# Patient Record
Sex: FEMALE | Race: WHITE | NOT HISPANIC OR LATINO | Employment: FULL TIME | URBAN - METROPOLITAN AREA
[De-identification: names, ages, dates, MRNs, and addresses within clinical notes are randomized per-mention and may not be internally consistent; named-entity substitution may affect disease eponyms.]

---

## 2017-02-27 ENCOUNTER — GENERIC CONVERSION - ENCOUNTER (OUTPATIENT)
Dept: OTHER | Facility: OTHER | Age: 52
End: 2017-02-27

## 2017-04-25 ENCOUNTER — TRANSCRIBE ORDERS (OUTPATIENT)
Dept: ADMINISTRATIVE | Facility: HOSPITAL | Age: 52
End: 2017-04-25

## 2017-04-25 DIAGNOSIS — R10.9 ABDOMINAL PAIN, UNSPECIFIED SITE: Primary | ICD-10-CM

## 2017-05-12 ENCOUNTER — HOSPITAL ENCOUNTER (OUTPATIENT)
Dept: RADIOLOGY | Facility: MEDICAL CENTER | Age: 52
Discharge: HOME | End: 2017-05-12
Payer: COMMERCIAL

## 2017-05-12 DIAGNOSIS — R10.9 ABDOMINAL PAIN, UNSPECIFIED SITE: ICD-10-CM

## 2017-05-12 PROCEDURE — 76700 US EXAM ABDOM COMPLETE: CPT

## 2018-01-10 NOTE — MISCELLANEOUS
Message   Recorded as Task   Date: 09/29/2016 03:42 PM, Created By: Boris Wade   Task Name: Med Renewal Request   Assigned To: Alycia Kumari   Regarding Patient: Bal Ball, Status: In Progress   Comment:    Lissett Ross - 29 Sep 2016 3:42 PM     TASK CREATED  Caller: Self; [phone] [facility] Phone)  pt needs refill of tranexamic acid please advise rx Rajivos Goodell pt @ [phone]   [facility] - 29 Sep 2016 3:44 PM     TASK IN PROGRESS   Aj Jaime - 29 Sep 2016 3:48 PM     TASK EDITED   Rx refilled - enough till yly in 2 mos        Active Problems    1  Abnormal mammogram (793 80) (R92 8)   2  Encounter for routine gynecological examination (V72 31) (Z01 419)   3  Encounter for screening mammogram for malignant neoplasm of breast (V76 12)   (Z12 31)   4  Hemorrhoids (455 6) (K64 9)   5  Lump, breast (611 72) (N63)   6  Menorrhagia (626 2) (N92 0)   7  Right knee pain (719 46) (M25 561)   8  Screening for HPV (human papillomavirus) (V73 81) (Z11 51)   9  Visit for screening mammogram (V76 12) (Z12 31)    Current Meds   1  Lisinopril-Hydrochlorothiazide 20-12 5 MG Oral Tablet; Therapy: (Recorded:73Txf7875) to Recorded   2  Meloxicam 15 MG Oral Tablet; Therapy: (Recorded:37Hsr0716) to Recorded   3  Potassium Chloride Veronica ER 10 MEQ Oral Tablet Extended Release; Therapy: (Recorded:14Jun2015) to Recorded   4  TraMADol HCl - 50 MG Oral Tablet; Therapy: (Recorded:20Mar2014) to Recorded   5  Tranexamic Acid 650 MG Oral Tablet; TAKE 2 TABLETS 3 TIMES A DAY FOR 5 DAYS; Therapy: 41YIU1396 to (Evaluate:26Jun2016)  Requested for: 28Mar2016; Last   Rx:28Mar2016 Ordered    Allergies    1  Sulfa Drugs    Plan  Menorrhagia    · Tranexamic Acid 650 MG Oral Tablet (Lysteda); TAKE 2 TABLETS 3 TIMES A  DAY FOR 5 DAYS    Signatures   Electronically signed by :  Marie Lockett, ; Sep 29 2016  3:48PM EST                       (Author)

## 2018-01-14 NOTE — MISCELLANEOUS
Message   Recorded as Task   Date: 02/25/2017 09:13 AM, Created By: System   Task Name: Rx Renew Request   Assigned To: Abebe Fee   Regarding Patient: Prabha Orta, Status: In Progress   Comment:    System - 25 Feb 2017 9:13 AM     PHARMACY: [id number]  PATIENT: Prabha Orta  MEDICATION: NICA 0 35 MG      TAB Adrienne Read - 27 Feb 2017 7:24 AM     TASK REASSIGNED: Previously Assigned To Samantha Rock - 27 Feb 2017 8:56 AM     TASK EDITED  lmtcb  Pt needs 3 mo pill check   Marylene Noon - 27 Feb 2017 9:10 AM     TASK IN PROGRESS   Marylene Noon - 27 Feb 2017 9:37 AM     TASK EDITED  Pt has menorrhagia  She was to start [place] with her menses  Her menses began 1/18 and she did begin oc same day  Period lasted 3 weeks - but she said menses often lasted very long also - but she stopped pill  She is now on nothing  Obviously, does not need pill check  What to do? Destiny Price - 27 Feb 2017 1:27 PM     TASK REPLIED TO: Previously Assigned To Destiny Price  She could restart Nica x 3 months, knowing that she will have irregular bleeding for the first 3 months  She was having periods lasting 12 days in the past so she probably will have irregular bleeding for the first few months  Other option would be Mirena which we discussed at her visit, as well  Marylene Noon - 27 Feb 2017 1:36 PM     TASK EDITED  Pt not interested in [address]  Will restart nica with menses and call for pill check in 3 mos        Active Problems    1  Abnormal mammogram (793 80) (R92 8)   2  Encounter for gynecological examination without abnormal finding (V72 31) (Z01 419)   3  Encounter for screening mammogram for malignant neoplasm of breast (V76 12)   (Z12 31)   4  Hemorrhoids (455 6) (K64 9)   5  Lump, breast (611 72) (N63)   6  Menorrhagia (626 2) (N92 0)   7  Menorrhagia with irregular cycle (626 2) (N92 1)   8  Right knee pain (719 46) (M25 561)   9   Screening for HPV (human papillomavirus) (V73 81) (Z11 51)   10  Visit for screening mammogram (V76 12) (Z12 31)    Current Meds   1  Nica 0 35 MG Oral Tablet; TAKE 1 TABLET DAILY; Therapy: 64CZH8929 to ()  Requested for: 30MOP9347; Last   Rx:[id number] Ordered   2  Lisinopril-Hydrochlorothiazide 20-12 5 MG Oral Tablet; Therapy: (Recorded:88Mzn2669) to Recorded   3  Meloxicam 15 MG Oral Tablet; Therapy: (Recorded:97Cbq1248) to Recorded   4  Potassium Chloride Veronica ER 10 MEQ Oral Tablet Extended Release; Therapy: (Recorded:14Jun2015) to Recorded   5  TraMADol HCl - 50 MG Oral Tablet; Therapy: (Recorded:20Mar2014) to Recorded   6  Tranexamic Acid 650 MG Oral Tablet (Lysteda); TAKE 2 TABLETS 3 TIMES A DAY FOR 5   DAYS; Therapy: 97JMP2893 to (Evaluate:29Oct2016)  Requested for: 88XAW6883; Last   Rx:[id number] Ordered    Allergies    1  Sulfa Drugs    Signatures   Electronically signed by :  Nicole Lockett, ; Feb 27 2017  1:36PM EST                       (Author)

## 2018-01-16 NOTE — MISCELLANEOUS
Message   Recorded as Task   Date: 11/09/2016 04:20 PM, Created By: Jacinta Zamarripa   Task Name: Call Back   Assigned To: Wayne Martinez   Regarding Patient: Katie Mckinney, Status: In Progress   Comment:    Monalisa Mendieta - 09 Nov 2016 4:20 PM     TASK CREATED  Caller: [facility], Pharmacist; Results Inquiry; [phone] (Day)  Pharmacy is calling for refills on Tranexamic 650 mg  Jose Linares Wg  Luz Welleron - 09 Nov 2016 4:27 PM     TASK IN PROGRESS   Luz Eaton - 09 Nov 2016 4:45 PM     TASK EDITED                 gave verbal refill to pharmacist until she comes for her apt alter this month  Active Problems    1  Abnormal mammogram (793 80) (R92 8)   2  Encounter for routine gynecological examination (V72 31) (Z01 419)   3  Encounter for screening mammogram for malignant neoplasm of breast (V76 12)   (Z12 31)   4  Hemorrhoids (455 6) (K64 9)   5  Lump, breast (611 72) (N63)   6  Menorrhagia (626 2) (N92 0)   7  Right knee pain (719 46) (M25 561)   8  Screening for HPV (human papillomavirus) (V73 81) (Z11 51)   9  Visit for screening mammogram (V76 12) (Z12 31)    Current Meds   1  Lisinopril-Hydrochlorothiazide 20-12 5 MG Oral Tablet; Therapy: (Recorded:18Qed0725) to Recorded   2  Meloxicam 15 MG Oral Tablet; Therapy: (Recorded:13Xzj7308) to Recorded   3  Potassium Chloride Veronica ER 10 MEQ Oral Tablet Extended Release; Therapy: (Recorded:14Jun2015) to Recorded   4  TraMADol HCl - 50 MG Oral Tablet; Therapy: (Recorded:20Mar2014) to Recorded   5  Tranexamic Acid 650 MG Oral Tablet (Lysteda); TAKE 2 TABLETS 3 TIMES A DAY FOR 5   DAYS; Therapy: 08DNP6227 to (Evaluate:29Oct2016)  Requested for: 00FET6930; Last   Rx:[id number] Ordered    Allergies    1   Sulfa Drugs    Signatures   Electronically signed by : Rj Calixto LPN; Nov 9 2253  2:23QY EST                       (Author)

## 2018-01-18 NOTE — MISCELLANEOUS
Message   Recorded as Task   Date: 12/29/2016 04:02 PM, Created By: Ismael Lofton   Task Name: Go to Result   Assigned To: Beau Quijano   Regarding Patient: John Sol, Status: In Progress   Comment:    Destiny Price - 29 Dec 2016 4:02 PM     TASK CREATED  pelvic ultrasound shows stable fibroid which looks submucosal, which could contribute to her prolonged bleeding  We can see how she does on Nica which I gave her at her appt, and she can still consider Mirena which should also help this  [facility] - 29 Dec 2016 4:09 PM     TASK IN PROGRESS   [facility] - 29 Dec 2016 4:09 PM     TASK EDITED  lmtcb   [facility] - 29 Dec 2016 4:14 PM     TASK EDITED  Pt has not started Revonda Loth yet as she has not gotten her period  She is aware of fibroid and will let us know about her bleeding in future        Active Problems    1  Abnormal mammogram (793 80) (R92 8)   2  Encounter for gynecological examination without abnormal finding (V72 31) (Z01 419)   3  Encounter for screening mammogram for malignant neoplasm of breast (V76 12)   (Z12 31)   4  Hemorrhoids (455 6) (K64 9)   5  Lump, breast (611 72) (N63)   6  Menorrhagia (626 2) (N92 0)   7  Menorrhagia with irregular cycle (626 2) (N92 1)   8  Right knee pain (719 46) (M25 561)   9  Screening for HPV (human papillomavirus) (V73 81) (Z11 51)   10  Visit for screening mammogram (V76 12) (Z12 31)    Current Meds   1  Nica 0 35 MG Oral Tablet; TAKE 1 TABLET DAILY; Therapy: 37UMR0453 to (67 488 45 07)  Requested for: 47JTL4225; Last   Rx:[id number] Ordered   2  Lisinopril-Hydrochlorothiazide 20-12 5 MG Oral Tablet; Therapy: (Recorded:17Zlt0679) to Recorded   3  Meloxicam 15 MG Oral Tablet; Therapy: (Recorded:28Eos6830) to Recorded   4  Potassium Chloride Veronica ER 10 MEQ Oral Tablet Extended Release; Therapy: (Recorded:14Jun2015) to Recorded   5  TraMADol HCl - 50 MG Oral Tablet; Therapy: (Recorded:20Mar2014) to Recorded   6   Tranexamic Acid 650 MG Oral Tablet (Lysteda); TAKE 2 TABLETS 3 TIMES A DAY FOR 5   DAYS; Therapy: 93QBU7577 to (Evaluate:29Oct2016)  Requested for: 73IFD7623; Last   Rx:30Sep2016 Ordered    Allergies    1  Sulfa Drugs    Signatures   Electronically signed by :  Marie Lockett, ; Dec 29 2016  4:14PM EST                       (Author)

## 2018-09-11 ENCOUNTER — APPOINTMENT (OUTPATIENT)
Dept: RADIOLOGY | Facility: MEDICAL CENTER | Age: 53
End: 2018-09-11
Payer: COMMERCIAL

## 2018-09-11 ENCOUNTER — TRANSCRIBE ORDERS (OUTPATIENT)
Dept: ADMINISTRATIVE | Facility: HOSPITAL | Age: 53
End: 2018-09-11

## 2018-09-11 DIAGNOSIS — M54.16 LUMBAR RADICULOPATHY: Primary | ICD-10-CM

## 2018-09-11 PROCEDURE — 72100 X-RAY EXAM L-S SPINE 2/3 VWS: CPT

## 2018-09-18 ENCOUNTER — OFFICE VISIT (OUTPATIENT)
Dept: OBGYN CLINIC | Facility: MEDICAL CENTER | Age: 53
End: 2018-09-18
Payer: COMMERCIAL

## 2018-09-18 ENCOUNTER — APPOINTMENT (OUTPATIENT)
Dept: RADIOLOGY | Facility: MEDICAL CENTER | Age: 53
End: 2018-09-18
Payer: COMMERCIAL

## 2018-09-18 VITALS
DIASTOLIC BLOOD PRESSURE: 79 MMHG | HEART RATE: 93 BPM | SYSTOLIC BLOOD PRESSURE: 118 MMHG | HEIGHT: 62 IN | BODY MASS INDEX: 38.09 KG/M2 | WEIGHT: 207 LBS

## 2018-09-18 DIAGNOSIS — M79.651 PAIN IN BOTH THIGHS: ICD-10-CM

## 2018-09-18 DIAGNOSIS — M79.652 PAIN IN BOTH THIGHS: Primary | ICD-10-CM

## 2018-09-18 DIAGNOSIS — M25.551 PAIN OF BOTH HIP JOINTS: ICD-10-CM

## 2018-09-18 DIAGNOSIS — M79.651 PAIN IN BOTH THIGHS: Primary | ICD-10-CM

## 2018-09-18 DIAGNOSIS — M25.552 PAIN OF BOTH HIP JOINTS: ICD-10-CM

## 2018-09-18 DIAGNOSIS — M79.652 PAIN IN BOTH THIGHS: ICD-10-CM

## 2018-09-18 PROCEDURE — 73521 X-RAY EXAM HIPS BI 2 VIEWS: CPT

## 2018-09-18 PROCEDURE — 99242 OFF/OP CONSLTJ NEW/EST SF 20: CPT | Performed by: EMERGENCY MEDICINE

## 2018-09-18 RX ORDER — MELOXICAM 15 MG/1
TABLET ORAL
COMMUNITY
Start: 2018-09-03

## 2018-09-18 RX ORDER — ATORVASTATIN CALCIUM 20 MG/1
TABLET, FILM COATED ORAL
COMMUNITY
Start: 2018-09-04

## 2018-09-18 RX ORDER — PANTOPRAZOLE SODIUM 40 MG/1
TABLET, DELAYED RELEASE ORAL
COMMUNITY
Start: 2018-09-04

## 2018-09-18 RX ORDER — METHOCARBAMOL 750 MG/1
TABLET, FILM COATED ORAL
COMMUNITY
Start: 2018-09-08 | End: 2018-10-03

## 2018-09-18 RX ORDER — POTASSIUM CHLORIDE 20 MEQ/1
TABLET, EXTENDED RELEASE ORAL
COMMUNITY
Start: 2018-09-03 | End: 2018-10-30

## 2018-09-18 RX ORDER — PREDNISONE 20 MG/1
TABLET ORAL
Qty: 18 TABLET | Refills: 0 | Status: SHIPPED | OUTPATIENT
Start: 2018-09-18 | End: 2018-10-03

## 2018-09-18 RX ORDER — LISINOPRIL AND HYDROCHLOROTHIAZIDE 20; 12.5 MG/1; MG/1
TABLET ORAL
COMMUNITY
Start: 2018-09-04 | End: 2018-10-30

## 2018-09-18 NOTE — PROGRESS NOTES
Assessment/Plan:    Diagnoses and all orders for this visit:    Pain in both thighs  -     XR hips bilateral 2 vw w pelvis if performed; Future  -     XR pelvis ap only 1 or 2 vw; Future  -     predniSONE 20 mg tablet; 1 tab PO TID x 3 days, then 1 tab PO BID x 3 days, then 1 tab PO QD x 3 days    Pain of both hip joints  -     XR hips bilateral 2 vw w pelvis if performed; Future  -     XR pelvis ap only 1 or 2 vw; Future    Patient presents for chronic he anterior thigh pain  Differential diagnosis includes lumbar radiculopathy bilaterally, hip osteoarthritis, trochanteric bursitis, or systemic issues such as polymyalgia rheumatica (however patient does not have UE proximal muscle pain/stiffness)  Also t/c infectious  Recommend prednisone taper  Patient declines PT due to time constraints  T/c Rheum eval       Return in about 4 weeks (around 10/16/2018)  Chief Complaint:   b/l leg pain    Subjective:   Patient ID: Layne Sexton is a 45 y o  female  NP referred here today by PCP for chronic b/l anterior thigh pains for which is worsening recently previously treated with meloxicam   Worse with prolonged sitting, walking  Denies shoulder problems  X-ray of the lumbar spine was obtained which did show posterior facet arthritis and mild grade 1 spondylolisthesis  However  Denies back pain   + n/t thighs  She states b/l knees lock up  Worse at night time  Patient does stand many hours at work  Review of Systems   Constitutional: Negative  HENT: Negative  Eyes: Negative  Respiratory: Negative  Cardiovascular: Negative  Gastrointestinal: Negative  Endocrine: Negative  Genitourinary: Negative  Musculoskeletal: Positive for arthralgias and myalgias  Skin: Negative  Neurological: Positive for numbness  Psychiatric/Behavioral: Negative  The following portions of the patient's chart were reviewed and updated as appropriate:    Allergy:    Allergies   Allergen Reactions    Sulfa Antibiotics          Past Medical History:   Diagnosis Date    Hypertension     Rheumatoid arthritis ([address] )        Past Surgical History:   Procedure Laterality Date     SECTION         Social History     Social History    Marital status: /Civil Union     Spouse name: N/A    Number of children: N/A    Years of education: N/A     Occupational History    Not on file  Social History Main Topics    Smoking status: Former Smoker    Smokeless tobacco: Never Used    Alcohol use Yes      Comment: Occasionally    Drug use: No    Sexual activity: Not on file     Other Topics Concern    Not on file     Social History Narrative    No narrative on file       Family History   Problem Relation Age of Onset    Heart disease Mother     Hypertension Mother     Diabetes Mother        Medications:    Current Outpatient Prescriptions:     Acetaminophen (TYLENOL ARTHRITIS PAIN PO), Take by mouth, Disp: , Rfl:     atorvastatin (LIPITOR) 20 mg tablet, , Disp: , Rfl:     lisinopril-hydrochlorothiazide (PRINZIDE,ZESTORETIC) 20-12 5 MG per tablet, , Disp: , Rfl:     meloxicam (MOBIC) 15 mg tablet, , Disp: , Rfl:     methocarbamol (ROBAXIN) 750 mg tablet, , Disp: , Rfl:     Multiple Vitamins-Minerals (CENTRUM SILVER PO), Take by mouth, Disp: , Rfl:     pantoprazole (PROTONIX) 40 mg tablet, , Disp: , Rfl:     potassium chloride (K-DUR,KLOR-CON) 20 mEq tablet, , Disp: , Rfl:     predniSONE 20 mg tablet, 1 tab PO TID x 3 days, then 1 tab PO BID x 3 days, then 1 tab PO QD x 3 days, Disp: 18 tablet, Rfl: 0    There is no problem list on file for this patient  Objective:  Right Hip Exam   Right hip exam is normal      Range of Motion   The patient has normal right hip ROM  Other   Sensation: normal      Left Hip Exam     Tenderness   The patient is experiencing tenderness in the greater trochanter  Range of Motion   The patient has normal left hip ROM      Other   Sensation: normal      Back Exam     Range of Motion   Extension: normal   Flexion: normal   Rotation Right: normal   Rotation Left: normal     Tests   Straight leg raise right: negative  Straight leg raise left: negative    Reflexes   Patellar: normal    Other   Toe Walk: normal  Heel Walk: normal  Sensation: normal  Gait: normal             Physical Exam      Neurologic Exam    Procedures    I have personally reviewed the written report of the pertinent studies       Mild scoliotic deformity is noted  Mild anterolisthesis of L4 on L5  Mild retrolisthesis of L1 and L2      Alignment is otherwise unremarkable      Moderate degenerative facet disease at L4-L5 and L5-S1 bilaterally      Mild degenerative disc disease throughout the lower thoracic and lumbar spine

## 2018-09-18 NOTE — LETTER
September 18, 2018     Nadiya AguilarDO  [address] [place] [address]    Patient: Antelmo Logan   YOB: 1965   Date of Visit: 9/18/2018       Dear Dr Florin Almendarez: Thank you for referring Antelmo Logan to me for evaluation  Below are the relevant portions of my assessment and plan of care  If you have questions, please do not hesitate to call me  I look forward to following Alexis Marie along with you           Sincerely,        Patricia Dow MD        CC: No Recipients

## 2018-10-01 ENCOUNTER — TELEPHONE (OUTPATIENT)
Dept: OBGYN CLINIC | Facility: HOSPITAL | Age: 53
End: 2018-10-01

## 2018-10-01 NOTE — TELEPHONE ENCOUNTER
Please call patient to let her know that I would like to see her again in the office to discuss her pain and reevaluate her  She is scheduled like 10/16 but she can come in sooner

## 2018-10-01 NOTE — TELEPHONE ENCOUNTER
Caller: patient  Callback# [phone]  Dr Madden Current        I received a voice message 10/01 patient is requesting pain medication please advise thanks

## 2018-10-03 ENCOUNTER — APPOINTMENT (OUTPATIENT)
Dept: LAB | Facility: MEDICAL CENTER | Age: 53
End: 2018-10-03
Payer: COMMERCIAL

## 2018-10-03 ENCOUNTER — OFFICE VISIT (OUTPATIENT)
Dept: OBGYN CLINIC | Facility: MEDICAL CENTER | Age: 53
End: 2018-10-03
Payer: COMMERCIAL

## 2018-10-03 VITALS
DIASTOLIC BLOOD PRESSURE: 87 MMHG | HEART RATE: 96 BPM | BODY MASS INDEX: 37.73 KG/M2 | SYSTOLIC BLOOD PRESSURE: 122 MMHG | WEIGHT: 205 LBS | HEIGHT: 62 IN

## 2018-10-03 DIAGNOSIS — M79.652 PAIN IN BOTH THIGHS: Primary | ICD-10-CM

## 2018-10-03 DIAGNOSIS — M79.604 LEG PAIN, BILATERAL: ICD-10-CM

## 2018-10-03 DIAGNOSIS — M79.605 LEG PAIN, BILATERAL: ICD-10-CM

## 2018-10-03 DIAGNOSIS — M79.651 PAIN IN BOTH THIGHS: ICD-10-CM

## 2018-10-03 DIAGNOSIS — M79.652 PAIN IN BOTH THIGHS: ICD-10-CM

## 2018-10-03 DIAGNOSIS — M79.651 PAIN IN BOTH THIGHS: Primary | ICD-10-CM

## 2018-10-03 LAB
ALBUMIN SERPL BCP-MCNC: 3.3 G/DL (ref 3.5–5)
ALP SERPL-CCNC: 57 U/L (ref 46–116)
ALT SERPL W P-5'-P-CCNC: 45 U/L (ref 12–78)
ANION GAP SERPL CALCULATED.3IONS-SCNC: 8 MMOL/L (ref 4–13)
AST SERPL W P-5'-P-CCNC: 22 U/L (ref 5–45)
BASOPHILS # BLD AUTO: 0.06 THOUSANDS/ΜL (ref 0–0.1)
BASOPHILS NFR BLD AUTO: 1 % (ref 0–1)
BILIRUB SERPL-MCNC: 1.13 MG/DL (ref 0.2–1)
BUN SERPL-MCNC: 12 MG/DL (ref 5–25)
CALCIUM SERPL-MCNC: 8.8 MG/DL (ref 8.3–10.1)
CHLORIDE SERPL-SCNC: 97 MMOL/L (ref 100–108)
CK SERPL-CCNC: 125 U/L (ref 26–192)
CO2 SERPL-SCNC: 28 MMOL/L (ref 21–32)
CREAT SERPL-MCNC: 0.53 MG/DL (ref 0.6–1.3)
CRP SERPL QL: <3 MG/L
EOSINOPHIL # BLD AUTO: 0.2 THOUSAND/ΜL (ref 0–0.61)
EOSINOPHIL NFR BLD AUTO: 3 % (ref 0–6)
ERYTHROCYTE [DISTWIDTH] IN BLOOD BY AUTOMATED COUNT: 15.9 % (ref 11.6–15.1)
ERYTHROCYTE [SEDIMENTATION RATE] IN BLOOD: 4 MM/HOUR (ref 0–20)
GFR SERPL CREATININE-BSD FRML MDRD: 107 ML/MIN/1.73SQ M
GLUCOSE P FAST SERPL-MCNC: 106 MG/DL (ref 65–99)
HCT VFR BLD AUTO: 39.5 % (ref 34.8–46.1)
HGB BLD-MCNC: 12.6 G/DL (ref 11.5–15.4)
IMM GRANULOCYTES # BLD AUTO: 0.01 THOUSAND/UL (ref 0–0.2)
IMM GRANULOCYTES NFR BLD AUTO: 0 % (ref 0–2)
LYMPHOCYTES # BLD AUTO: 1.81 THOUSANDS/ΜL (ref 0.6–4.47)
LYMPHOCYTES NFR BLD AUTO: 31 % (ref 14–44)
MCH RBC QN AUTO: 26.3 PG (ref 26.8–34.3)
MCHC RBC AUTO-ENTMCNC: 31.9 G/DL (ref 31.4–37.4)
MCV RBC AUTO: 83 FL (ref 82–98)
MONOCYTES # BLD AUTO: 0.59 THOUSAND/ΜL (ref 0.17–1.22)
MONOCYTES NFR BLD AUTO: 10 % (ref 4–12)
NEUTROPHILS # BLD AUTO: 3.18 THOUSANDS/ΜL (ref 1.85–7.62)
NEUTS SEG NFR BLD AUTO: 55 % (ref 43–75)
NRBC BLD AUTO-RTO: 0 /100 WBCS
PLATELET # BLD AUTO: 248 THOUSANDS/UL (ref 149–390)
PMV BLD AUTO: 10.7 FL (ref 8.9–12.7)
POTASSIUM SERPL-SCNC: 3.4 MMOL/L (ref 3.5–5.3)
PROT SERPL-MCNC: 6.3 G/DL (ref 6.4–8.2)
RBC # BLD AUTO: 4.79 MILLION/UL (ref 3.81–5.12)
SODIUM SERPL-SCNC: 133 MMOL/L (ref 136–145)
TSH SERPL DL<=0.05 MIU/L-ACNC: 1.33 UIU/ML (ref 0.36–3.74)
WBC # BLD AUTO: 5.85 THOUSAND/UL (ref 4.31–10.16)

## 2018-10-03 PROCEDURE — 36415 COLL VENOUS BLD VENIPUNCTURE: CPT

## 2018-10-03 PROCEDURE — 86618 LYME DISEASE ANTIBODY: CPT

## 2018-10-03 PROCEDURE — 86038 ANTINUCLEAR ANTIBODIES: CPT

## 2018-10-03 PROCEDURE — 86430 RHEUMATOID FACTOR TEST QUAL: CPT

## 2018-10-03 PROCEDURE — 85652 RBC SED RATE AUTOMATED: CPT

## 2018-10-03 PROCEDURE — 80053 COMPREHEN METABOLIC PANEL: CPT

## 2018-10-03 PROCEDURE — 82550 ASSAY OF CK (CPK): CPT

## 2018-10-03 PROCEDURE — 86140 C-REACTIVE PROTEIN: CPT

## 2018-10-03 PROCEDURE — 99214 OFFICE O/P EST MOD 30 MIN: CPT | Performed by: EMERGENCY MEDICINE

## 2018-10-03 PROCEDURE — 85025 COMPLETE CBC W/AUTO DIFF WBC: CPT

## 2018-10-03 PROCEDURE — 84443 ASSAY THYROID STIM HORMONE: CPT

## 2018-10-03 NOTE — LETTER
October 3, 2018     Migel Kim, DO  [address]  [facility] [address]    Patient: Frantz Da Silva   YOB: 1965   Date of Visit: 10/3/2018       Dear Dr Shauna Suarez: Thank you for referring Frantz Da Silva to me for evaluation  Below are the relevant portions of my assessment and plan of care  If you have questions, please do not hesitate to call me  I look forward to following Soumya Neely along with you           Sincerely,        Dicky Peabody, MD        CC: No Recipients

## 2018-10-03 NOTE — PROGRESS NOTES
Assessment/Plan:    Diagnoses and all orders for this visit:    Pain in both thighs  -     RF Screen w/ Reflex to Titer; Future  -     LILIAN Screen w/ Reflex to Titer/Pattern; Future  -     C-reactive protein; Future  -     Sedimentation rate, automated; Future  -     CK (with reflex to MB); Future  -     Lyme Antibody Profile with reflex to WB; Future  -     CBC and differential; Future  -     Comprehensive metabolic panel; Future  -     TSH, 3rd generation with Free T4 reflex; Future  -     Ambulatory referral to Rheumatology; Future    Leg pain, bilateral  -     RF Screen w/ Reflex to Titer; Future  -     LILIAN Screen w/ Reflex to Titer/Pattern; Future  -     C-reactive protein; Future  -     Sedimentation rate, automated; Future  -     CK (with reflex to MB); Future  -     Lyme Antibody Profile with reflex to WB; Future  -     CBC and differential; Future  -     Comprehensive metabolic panel; Future  -     TSH, 3rd generation with Free T4 reflex; Future  -     Ambulatory referral to Rheumatology; Future     at this point in time I would recommend obtaining blood work to rule out systemic etiologies  The symptoms could be suspicious for being caused by the lumbar spine as the x-ray does show facet arthritis as well as listhesis, however the patient's neurological exam is normal and she does not have any back pain  In addition to the cost of the obtaining an MRI of the lumbar spine we will hold off at this time  I have also recommended referral to Rheumatology for their evaluation and opinion  Will call with results      Return if symptoms worsen or fail to improve  Chief Complaint:   f/u leg pains    Subjective:   Patient ID: Virginia Barry is a 52 y o  female  Patient returns having had improvement with prednisone while she was on vacation, however she did have the pains at night which woke her up  Pains of b/l thighs and below the knees with lateral thigh n/t    Pain is worse with prolonged walking and even sitting  Also notes pains are worse with menstrual cycle  Denies back or knee pains, however she is now experiencing b/l ankle pains  Patient states she does have distant family members with R A  Initial note:  NP referred here today by PCP for chronic b/l anterior thigh pains for which is worsening recently previously treated with meloxicam   Worse with prolonged sitting, walking  Denies shoulder problems  X-ray of the lumbar spine was obtained which did show posterior facet arthritis and mild grade 1 spondylolisthesis  However  Denies back pain   + n/t thighs  She states b/l knees lock up  Worse at night time  Patient does stand many hours at work  Review of Systems   Constitutional: Negative for fever  Musculoskeletal: Positive for arthralgias and myalgias  The following portions of the patient's chart were reviewed and updated as appropriate: Allergy:    Allergies   Allergen Reactions    Sulfa Antibiotics          Past Medical History:   Diagnosis Date    Hypertension     Rheumatoid arthritis ([facility] [address] )        Past Surgical History:   Procedure Laterality Date     SECTION         Social History     Social History    Marital status: /Civil Union     Spouse name: N/A    Number of children: N/A    Years of education: N/A     Occupational History    Not on file       Social History Main Topics    Smoking status: Former Smoker    Smokeless tobacco: Never Used    Alcohol use Yes      Comment: Occasionally    Drug use: No    Sexual activity: Not on file     Other Topics Concern    Not on file     Social History Narrative    No narrative on file       Family History   Problem Relation Age of Onset    Heart disease Mother     Hypertension Mother     Diabetes Mother        Medications:    Current Outpatient Prescriptions:     Acetaminophen (TYLENOL ARTHRITIS PAIN PO), Take by mouth, Disp: , Rfl:     atorvastatin (LIPITOR) 20 mg tablet, , Disp: , Rfl:   lisinopril-hydrochlorothiazide (PRINZIDE,ZESTORETIC) 20-12 5 MG per tablet, , Disp: , Rfl:     meloxicam (MOBIC) 15 mg tablet, , Disp: , Rfl:     Multiple Vitamins-Minerals (CENTRUM SILVER PO), Take by mouth, Disp: , Rfl:     pantoprazole (PROTONIX) 40 mg tablet, , Disp: , Rfl:     potassium chloride (K-DUR,KLOR-CON) 20 mEq tablet, , Disp: , Rfl:     There is no problem list on file for this patient  Objective:  Right Knee Exam     Tenderness   The patient is experiencing no tenderness  Range of Motion   The patient has normal right knee ROM  Left Knee Exam     Tenderness   The patient is experiencing no tenderness  Range of Motion   The patient has normal left knee ROM  Right Hip Exam     Muscle Strength   Flexion: 5/5     Comments:   Anterior thighs are tender to palpation   patient denies any groin pain with range of motion      Left Hip Exam     Muscle Strength   Flexion: 5/5       Back Exam     Muscle Strength   The patient has normal back strength  Tests   Straight leg raise right: negative  Straight leg raise left: negative    Reflexes   Patellar: normal  Achilles: normal    Other   Toe Walk: normal  Heel Walk: normal  Sensation: normal  Gait: normal     Comments:  Patient with non painful range-of-motion of the lumbar spine in all planes  She does have decreased range of motion in flexion but no pain   negative clonus bilaterally            Physical Exam      Neurologic Exam    Procedures    I have personally reviewed the written report of the pertinent studies  Lumbar spine  FINDINGS:     There is no evidence of acute fracture or destructive osseous lesion      Mild scoliotic deformity is noted  Mild anterolisthesis of L4 on L5    Mild retrolisthesis of L1 and L2      Alignment is otherwise unremarkable      Moderate degenerative facet disease at L4-L5 and L5-S1 bilaterally      Mild degenerative disc disease throughout the lower thoracic and lumbar spine      The pedicles appear intact      Soft tissues are unremarkable      IMPRESSION:     No acute osseous abnormality        Degenerative changes as described

## 2018-10-04 LAB
B BURGDOR IGG SER IA-ACNC: 0.07
B BURGDOR IGM SER IA-ACNC: 0.18
RHEUMATOID FACT SER QL LA: NEGATIVE

## 2018-10-05 LAB — RYE IGE QN: NEGATIVE

## 2018-10-09 ENCOUNTER — TELEPHONE (OUTPATIENT)
Dept: OBGYN CLINIC | Facility: HOSPITAL | Age: 53
End: 2018-10-09

## 2018-10-09 NOTE — TELEPHONE ENCOUNTER
Juan Jose Garcia Enter patient  C/b# [phone]  The patient is requesting her lab results  Done at [facility]

## 2018-10-10 NOTE — TELEPHONE ENCOUNTER
Please inform patient that labs do not reveal any cause of her symptoms  However her sodium, potassium, chloride and bilirubin are mildly abnormal, and she can follow up with her PCP for these nonurgently  Thanks!

## 2018-10-12 NOTE — TELEPHONE ENCOUNTER
Spoke w/ pt and informed her of the results  Also faxing lab results to [address]  Pt would also like to know that since she cannot get into rheumatology until March what she should do in the meantime or if there is someone else she can see sooner  Please advise  Thank you  neck was WNL (within normal limits)/trunk was WNL (within normal limits)

## 2018-10-12 NOTE — TELEPHONE ENCOUNTER
I would like to see the patient back in the office for reevaluation and to discuss next step     Thanks

## 2018-10-16 ENCOUNTER — OFFICE VISIT (OUTPATIENT)
Dept: OBGYN CLINIC | Facility: MEDICAL CENTER | Age: 53
End: 2018-10-16
Payer: COMMERCIAL

## 2018-10-16 VITALS
DIASTOLIC BLOOD PRESSURE: 85 MMHG | HEART RATE: 94 BPM | BODY MASS INDEX: 39.12 KG/M2 | SYSTOLIC BLOOD PRESSURE: 135 MMHG | WEIGHT: 212.6 LBS | HEIGHT: 62 IN

## 2018-10-16 DIAGNOSIS — M54.16 RADICULOPATHY, LUMBAR REGION: ICD-10-CM

## 2018-10-16 DIAGNOSIS — M79.652 PAIN IN BOTH THIGHS: Primary | ICD-10-CM

## 2018-10-16 DIAGNOSIS — M79.651 PAIN IN BOTH THIGHS: Primary | ICD-10-CM

## 2018-10-16 PROCEDURE — 99213 OFFICE O/P EST LOW 20 MIN: CPT | Performed by: EMERGENCY MEDICINE

## 2018-10-16 NOTE — PROGRESS NOTES
Assessment/Plan:    Diagnoses and all orders for this visit:    Pain in both thighs  -     MRI lumbar spine wo contrast; Future    Radiculopathy, lumbar region  -     MRI lumbar spine wo contrast; Future     I would like to obtain an MRI of the lumbar spine for patient with possible lumbar radiculopathy in the bilateral thighs  She does have spondylolisthesis and significant facet osteoarthritis of the lumbar spine on x-rays  Bilateral hip x-rays within normal limits  Reviewed lab work patient to f/u with PCP, however no obvious cause of symptoms based on lab work  She will see if she can get in sooner to see Rheum  Return for Follow Up After Imaging Study  Chief Complaint:   f/u thigh pain    Subjective:   Patient ID: Qasim Manriquez is a 52 y o  female  Patient returns with continued symptoms of the bilaterally anterior thigh pain which she describes as a tearing or ripping sensation  States the pain is worse at nighttime in bed she is having a hard time sleeping  Denies any numbness tingling or back pain  She also notes pains in her feet and her hands  She is currently scheduled with the rheumatologist far out in March of 2019  She since has not followed up with her PCP  Also wishes to review blood work results  States she may lose her job in December as she works for [employer]  Previous note: Patient returns having had improvement with prednisone while she was on vacation, however she did have the pains at night which woke her up  Pains of b/l thighs and below the knees with lateral thigh n/t  Pain is worse with prolonged walking and even sitting  Also notes pains are worse with menstrual cycle  Denies back or knee pains, however she is now experiencing b/l ankle pains  Patient states she does have distant family members with R A        Initial note:  NP referred here today by PCP for chronic b/l anterior thigh pains for which is worsening recently previously treated with meloxicam  Worse with prolonged sitting, walking  Denies shoulder problems  X-ray of the lumbar spine was obtained which did show posterior facet arthritis and mild grade 1 spondylolisthesis  However  Denies back pain   + n/t thighs  She states b/l knees lock up  Worse at night time  Patient does stand many hours at work  Review of Systems    The following portions of the patient's chart were reviewed and updated as appropriate: Allergy:    Allergies   Allergen Reactions    Sulfa Antibiotics          Past Medical History:   Diagnosis Date    Hypertension     Rheumatoid arthritis ([address] )        Past Surgical History:   Procedure Laterality Date     SECTION         Social History     Social History    Marital status: /Civil Union     Spouse name: N/A    Number of children: N/A    Years of education: N/A     Occupational History    Not on file  Social History Main Topics    Smoking status: Former Smoker    Smokeless tobacco: Never Used    Alcohol use Yes      Comment: Occasionally    Drug use: No    Sexual activity: Not on file     Other Topics Concern    Not on file     Social History Narrative    No narrative on file       Family History   Problem Relation Age of Onset    Heart disease Mother     Hypertension Mother     Diabetes Mother        Medications:    Current Outpatient Prescriptions:     Acetaminophen (TYLENOL ARTHRITIS PAIN PO), Take by mouth, Disp: , Rfl:     atorvastatin (LIPITOR) 20 mg tablet, , Disp: , Rfl:     lisinopril-hydrochlorothiazide (PRINZIDE,ZESTORETIC) 20-12 5 MG per tablet, , Disp: , Rfl:     meloxicam (MOBIC) 15 mg tablet, , Disp: , Rfl:     Multiple Vitamins-Minerals (CENTRUM SILVER PO), Take by mouth, Disp: , Rfl:     pantoprazole (PROTONIX) 40 mg tablet, , Disp: , Rfl:     potassium chloride (K-DUR,KLOR-CON) 20 mEq tablet, , Disp: , Rfl:     There is no problem list on file for this patient        Objective:  Right Hip Exam   Right hip exam is normal      Range of Motion   The patient has normal right hip ROM  Other   Sensation: normal      Left Hip Exam     Tenderness   The patient is experiencing tenderness in the greater trochanter  Range of Motion   The patient has normal left hip ROM  Other   Sensation: normal      Back Exam     Range of Motion   Extension: normal   Flexion: normal   Rotation Right: normal   Rotation Left: normal     Tests   Straight leg raise right: negative  Straight leg raise left: negative    Reflexes   Patellar: normal    Other   Toe Walk: normal  Heel Walk: normal  Sensation: normal  Gait: normal             Physical Exam   Constitutional: She is oriented to person, place, and time  She appears well-developed and well-nourished  HENT:   Head: Normocephalic and atraumatic  Eyes: Conjunctivae are normal    Neck: Neck supple  Cardiovascular: Intact distal pulses  Pulmonary/Chest: Effort normal    Neurological: She is alert and oriented to person, place, and time  Skin: Skin is warm and dry  Psychiatric: She has a normal mood and affect  Her behavior is normal    Vitals reviewed  Left facial droop chronic  No jaundice      Neurologic Exam     Mental Status   Oriented to person, place, and time  Procedures    I have personally reviewed the written report of the pertinent studies

## 2018-10-23 ENCOUNTER — HOSPITAL ENCOUNTER (OUTPATIENT)
Dept: RADIOLOGY | Age: 53
Discharge: HOME | End: 2018-10-23
Payer: COMMERCIAL

## 2018-10-23 DIAGNOSIS — M79.652 PAIN IN BOTH THIGHS: ICD-10-CM

## 2018-10-23 DIAGNOSIS — M54.16 RADICULOPATHY, LUMBAR REGION: ICD-10-CM

## 2018-10-23 DIAGNOSIS — M79.651 PAIN IN BOTH THIGHS: ICD-10-CM

## 2018-10-23 PROCEDURE — 72148 MRI LUMBAR SPINE W/O DYE: CPT

## 2018-10-30 ENCOUNTER — OFFICE VISIT (OUTPATIENT)
Dept: OBGYN CLINIC | Facility: MEDICAL CENTER | Age: 53
End: 2018-10-30
Payer: COMMERCIAL

## 2018-10-30 VITALS
SYSTOLIC BLOOD PRESSURE: 112 MMHG | BODY MASS INDEX: 39.05 KG/M2 | HEIGHT: 62 IN | HEART RATE: 88 BPM | WEIGHT: 212.2 LBS | DIASTOLIC BLOOD PRESSURE: 75 MMHG

## 2018-10-30 DIAGNOSIS — M79.651 PAIN IN BOTH THIGHS: Primary | ICD-10-CM

## 2018-10-30 DIAGNOSIS — M48.061 SPINAL STENOSIS OF LUMBAR REGION, UNSPECIFIED WHETHER NEUROGENIC CLAUDICATION PRESENT: ICD-10-CM

## 2018-10-30 DIAGNOSIS — M79.652 PAIN IN BOTH THIGHS: Primary | ICD-10-CM

## 2018-10-30 DIAGNOSIS — M54.16 RADICULOPATHY, LUMBAR REGION: ICD-10-CM

## 2018-10-30 PROCEDURE — 99213 OFFICE O/P EST LOW 20 MIN: CPT | Performed by: EMERGENCY MEDICINE

## 2018-10-30 RX ORDER — AMLODIPINE BESYLATE AND BENAZEPRIL HYDROCHLORIDE 10; 40 MG/1; MG/1
CAPSULE ORAL
COMMUNITY
Start: 2018-10-22 | End: 2022-05-06 | Stop reason: CLARIF

## 2018-10-30 NOTE — PROGRESS NOTES
Assessment/Plan:    Diagnoses and all orders for this visit:    Pain in both thighs  -     Ambulatory referral to Pain Management; Future  -     Ambulatory referral to Physical Therapy; Future    Radiculopathy, lumbar region  -     Ambulatory referral to Pain Management; Future  -     Ambulatory referral to Physical Therapy; Future    Spinal stenosis of lumbar region, unspecified whether neurogenic claudication present  -     Ambulatory referral to Pain Management; Future  -     Ambulatory referral to Physical Therapy; Future    Other orders  -     amLODIPine-benazepril (LOTREL) 10-40 MG per capsule;     I would like to refer to Pain Management and PT for lumbar stenosis seen on MRI for which I believe is the etiology of her chronic b/l thigh pain and n/t  She's had no relief with prednisone taper  Return if symptoms worsen or fail to improve  Chief Complaint:   f/u thigh pains    Subjective:   Patient ID: Cristina Day is a 52 y o  female  Patient returns to review MRI Lumbar spine  She states maybe symptoms improve with prolonged sitting but no change in symptoms with changes in positions or movements  Denies changes in b/b habits or any weakness  Previous note:  Patient returns with continued symptoms of the bilaterally anterior thigh pain which she describes as a tearing or ripping sensation  States the pain is worse at nighttime in bed she is having a hard time sleeping  Denies any numbness tingling or back pain  She also notes pains in her feet and her hands  She is currently scheduled with the rheumatologist far out in March of 2019  She since has not followed up with her PCP  Also wishes to review blood work results  States she may lose her job in December as she works for [email or web address]  Previous note: Patient returns having had improvement with prednisone while she was on vacation, however she did have the pains at night which woke her up    Pains of b/l thighs and below the knees with lateral thigh n/t  Pain is worse with prolonged walking and even sitting  Also notes pains are worse with menstrual cycle  Denies back or knee pains, however she is now experiencing b/l ankle pains  Patient states she does have distant family members with R A  Initial note:  NP referred here today by PCP for chronic b/l anterior thigh pains for which is worsening recently previously treated with meloxicam   Worse with prolonged sitting, walking  Denies shoulder problems  X-ray of the lumbar spine was obtained which did show posterior facet arthritis and mild grade 1 spondylolisthesis  However  Denies back pain   + n/t thighs  She states b/l knees lock up  Worse at night time  Patient does stand many hours at work  Review of Systems    The following portions of the patient's chart were reviewed and updated as appropriate: Allergy:    Allergies   Allergen Reactions    Sulfa Antibiotics          Past Medical History:   Diagnosis Date    Hypertension     Rheumat[address] ([facility] [address] )        Past Surgical History:   Procedure Laterality Date     SECTION         Social History     Social History    Marital status: /Civil Union     Spouse name: N/A    Number of children: N/A    Years of education: N/A     Occupational History    Not on file       Social History Main Topics    Smoking status: Former Smoker    Smokeless tobacco: Never Used    Alcohol use Yes      Comment: Occasionally    Drug use: No    Sexual activity: Not on file     Other Topics Concern    Not on file     Social History Narrative    No narrative on file       Family History   Problem Relation Age of Onset    Heart disease Mother     Hypertension Mother     Diabetes Mother        Medications:    Current Outpatient Prescriptions:     Acetaminophen (TYLENOL ARTHRITIS PAIN PO), Take by mouth, Disp: , Rfl:     amLODIPine-benazepril (LOTREL) 10-40 MG per capsule, , Disp: , Rfl:     atorvastatin (LIPITOR) 20 mg tablet, , Disp: , Rfl:     meloxicam (MOBIC) 15 mg tablet, , Disp: , Rfl:     Multiple Vitamins-Minerals (CENTRUM SILVER PO), Take by mouth, Disp: , Rfl:     pantoprazole (PROTONIX) 40 mg tablet, , Disp: , Rfl:     There is no problem list on file for this patient  Objective:  Ortho Exam    Physical Exam      Neurologic Exam    Procedures    I have personally reviewed the written report of the pertinent studies  IMPRESSION:     Severe facet degenerative change L4-5 and L5-S1 accounts for 5 mm anterolisthesis L4-5      Moderate to severe central stenosis and severe right lateral recess stenosis L4-5    Correlate for right L5 radiculopathy      Degenerative changes L3-4 noted with disc material approaching but not displacing the extraforaminal left L3 nerve root

## 2018-11-06 ENCOUNTER — TRANSCRIBE ORDERS (OUTPATIENT)
Dept: ADMINISTRATIVE | Facility: HOSPITAL | Age: 53
End: 2018-11-06

## 2018-11-06 ENCOUNTER — EVALUATION (OUTPATIENT)
Dept: PHYSICAL THERAPY | Facility: MEDICAL CENTER | Age: 53
End: 2018-11-06
Payer: COMMERCIAL

## 2018-11-06 ENCOUNTER — APPOINTMENT (OUTPATIENT)
Dept: LAB | Facility: MEDICAL CENTER | Age: 53
End: 2018-11-06
Payer: COMMERCIAL

## 2018-11-06 DIAGNOSIS — M54.16 RADICULOPATHY, LUMBAR REGION: ICD-10-CM

## 2018-11-06 DIAGNOSIS — M79.651 PAIN IN BOTH THIGHS: ICD-10-CM

## 2018-11-06 DIAGNOSIS — M48.061 SPINAL STENOSIS OF LUMBAR REGION, UNSPECIFIED WHETHER NEUROGENIC CLAUDICATION PRESENT: Primary | ICD-10-CM

## 2018-11-06 DIAGNOSIS — E87.6 HYPOPOTASSEMIA: Primary | ICD-10-CM

## 2018-11-06 DIAGNOSIS — M79.652 PAIN IN BOTH THIGHS: ICD-10-CM

## 2018-11-06 LAB
ANION GAP SERPL CALCULATED.3IONS-SCNC: 4 MMOL/L (ref 4–13)
BUN SERPL-MCNC: 20 MG/DL (ref 5–25)
CALCIUM SERPL-MCNC: 9.3 MG/DL (ref 8.3–10.1)
CHLORIDE SERPL-SCNC: 105 MMOL/L (ref 100–108)
CO2 SERPL-SCNC: 30 MMOL/L (ref 21–32)
CREAT SERPL-MCNC: 0.54 MG/DL (ref 0.6–1.3)
GFR SERPL CREATININE-BSD FRML MDRD: 107 ML/MIN/1.73SQ M
GLUCOSE P FAST SERPL-MCNC: 85 MG/DL (ref 65–99)
POTASSIUM SERPL-SCNC: 3.5 MMOL/L (ref 3.5–5.3)
SODIUM SERPL-SCNC: 139 MMOL/L (ref 136–145)

## 2018-11-06 PROCEDURE — 80048 BASIC METABOLIC PNL TOTAL CA: CPT | Performed by: FAMILY MEDICINE

## 2018-11-06 PROCEDURE — G8992 OTHER PT/OT  D/C STATUS: HCPCS | Performed by: PHYSICAL THERAPIST

## 2018-11-06 PROCEDURE — G8990 OTHER PT/OT CURRENT STATUS: HCPCS | Performed by: PHYSICAL THERAPIST

## 2018-11-06 PROCEDURE — G8991 OTHER PT/OT GOAL STATUS: HCPCS | Performed by: PHYSICAL THERAPIST

## 2018-11-06 PROCEDURE — 36415 COLL VENOUS BLD VENIPUNCTURE: CPT | Performed by: FAMILY MEDICINE

## 2018-11-06 PROCEDURE — 97161 PT EVAL LOW COMPLEX 20 MIN: CPT | Performed by: PHYSICAL THERAPIST

## 2018-11-06 NOTE — PROGRESS NOTES
PT Evaluation     Today's date: 2018  Patient name: Giana Portillo  : 1965  MRN: [id number]  Referring provider: Giulia Foster MD  Dx:   Encounter Diagnosis     ICD-10-CM    1  Spinal stenosis of lumbar region, unspecified whether neurogenic claudication present M48 061 Ambulatory referral to Physical Therapy   2  Pain in both thighs M79 651 Ambulatory referral to Physical Therapy    M79 652    3  Radiculopathy, lumbar region M54 16 Ambulatory referral to Physical Therapy        Never returned after eval           Assessment  Impairments: abnormal or restricted ROM, activity intolerance, impaired physical strength, lacks appropriate home exercise program and pain with function    Assessment details: Patient is a 53 y/o female who presents with complaints of low back pain with radiculopathy into b/l thighs  No further referral appears necessary at this time based upon examination results  Patient presents with the following impairments: decreased range of motion, decreased strength and decreased ability to perform functional tasks such as ambulation and work duties  Prognosis is good given HEP compliance and PT 2x/wk tapering to 1x/wk over the next 4-6 weeks  Positive prognostic indicators include positive attitude toward recovery  Negative prognostic indicators include co-morbidiites  Please contact me if you have any questions or recommendations  Thank you for the opportunity to share in [address] care  Understanding of Dx/Px/POC: good   Prognosis: good    Goals  STG  Decrease pain by 50% in 4 weeks  Increase range of motion from moderate to mild deficit in 4 weeks  Increase strength by half a grade in 4 weeks  LTG  Patient will be independent in hep in 4 weeks  Patient will be able to perform work duties at [employer] by D/C  Patient will be able to perform ambulation at plof by D/C      Plan  Patient would benefit from: skilled physical therapy  Referral necessary: No  Planned therapy interventions: home exercise program, functional ROM exercises, abdominal trunk stabilization, joint mobilization, manual therapy, neuromuscular re-education, patient education, strengthening, stretching and therapeutic exercise  Frequency: 2x week  Duration in weeks: 6  Plan of Care beginning date: 2018  Plan of Care expiration date: 2018  Treatment plan discussed with: patient        Subjective Evaluation    History of Present Illness  Date of onset: 2015  Mechanism of injury: Patient reports she has been having a lot of pain in her legs  Notes it was not too bad at first but its been getting worse lately  Patient notes that it has been going on for a few years  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    Quality of life: good    Pain  Current pain ratin  At best pain ratin  At worst pain rating: 10  Quality: radiating, throbbing and discomfort  Aggravating factors: standing and walking  Progression: worsening      Diagnostic Tests  MRI studies: abnormal  Patient Goals  Patient goals for therapy: decreased pain, increased strength, return to work, independence with ADLs/IADLs and increased motion          Objective     Active Range of Motion     Additional Active Range of Motion Details  Flexion: moderate deficit  Extension: mild deficit  R SB: moderate deficit  L SB: moderate deficit  R rotation: mild deficit  L rotation: mild deficit    Strength/Myotome Testing     Left Hip   Planes of Motion   Flexion: 4  Extension: 4+  Abduction: 4-  Adduction: 4+    Right Hip   Planes of Motion   Flexion: 4  Extension: 4+  Abduction: 4-  Adduction: 4+    Left Knee   Normal strength    Right Knee   Normal strength    Left Ankle/Foot   Normal strength    Right Ankle/Foot   Normal strength    Additional Strength Details  Abdominal strength: 4-/5    Tests     Additional Tests Details  Repeated flexion: reports legs hurting and feeling numb      Flowsheet Rows      Most Recent Value   PT/OT G-Codes   Current Score  44   Projected Score  57   FOTO information reviewed  Yes   Assessment Type  Evaluation   G code set  Other PT/OT Primary   Other PT Primary Current Status ()  CK   Other PT Primary Goal Status ()  CK          Precautions HTN, RA    Specialty Daily Treatment Diary     Manual                                                     Exercise Diary         bike        pball roll outs Hold for now ----      LTRs        PPT with hold        bridges        SLR        Hip add iso        Hip abd iso        SAQ        GTB shld ext        [facility] MENTAL HEALTH                                                                                    Modalities

## 2018-11-13 ENCOUNTER — APPOINTMENT (OUTPATIENT)
Dept: PHYSICAL THERAPY | Facility: MEDICAL CENTER | Age: 53
End: 2018-11-13
Payer: COMMERCIAL

## 2018-11-16 ENCOUNTER — APPOINTMENT (OUTPATIENT)
Dept: PHYSICAL THERAPY | Facility: MEDICAL CENTER | Age: 53
End: 2018-11-16
Payer: COMMERCIAL

## 2018-11-19 ENCOUNTER — CONSULT (OUTPATIENT)
Dept: PAIN MEDICINE | Facility: CLINIC | Age: 53
End: 2018-11-19
Payer: COMMERCIAL

## 2018-11-19 ENCOUNTER — TELEPHONE (OUTPATIENT)
Dept: PAIN MEDICINE | Facility: CLINIC | Age: 53
End: 2018-11-19

## 2018-11-19 VITALS
BODY MASS INDEX: 37.73 KG/M2 | HEART RATE: 90 BPM | TEMPERATURE: 98.7 F | DIASTOLIC BLOOD PRESSURE: 76 MMHG | WEIGHT: 205 LBS | SYSTOLIC BLOOD PRESSURE: 118 MMHG | HEIGHT: 62 IN

## 2018-11-19 DIAGNOSIS — M48.062 LUMBAR STENOSIS WITH NEUROGENIC CLAUDICATION: Primary | ICD-10-CM

## 2018-11-19 PROCEDURE — 99244 OFF/OP CNSLTJ NEW/EST MOD 40: CPT | Performed by: ANESTHESIOLOGY

## 2018-11-19 RX ORDER — GABAPENTIN 300 MG/1
300 CAPSULE ORAL
Qty: 30 CAPSULE | Refills: 0 | Status: SHIPPED | OUTPATIENT
Start: 2018-11-19 | End: 2019-06-11

## 2018-11-19 NOTE — TELEPHONE ENCOUNTER
Pt called again for an update  States that she has to let them know today if she is going to keep her PT appt for tomorrow or she will get charged  Pt would like a call back as soon as possible to advise  She can be reached at [phone]

## 2018-11-19 NOTE — PROGRESS NOTES
Assessment:  1  Lumbar stenosis with neurogenic claudication        Plan:  The patient's symptoms, history/physical are consistent with pain that is multifactorial in origin but predominantly the result of her spinal stenosis at L4-5 which is leading to bilateral radicular symptoms  At this time, I discussed performing bilateral L4 transforaminal epidural steroid injection to help reduce swelling and inflammation which is leading to her pain symptoms  She was apprised of the most common risks and would like to proceed  She will be scheduled for an upcoming Tuesday or Thursday under fluoroscopic guidance  In addition, to provide relief of her pain symptoms in the interim, I will start her on gabapentin 300 mg at bedtime  She was apprised most common side effects including sleepiness and dizziness  Complete risks and benefits including bleeding, infection, tissue reaction, nerve injury and allergic reaction were discussed  The approach was demonstrated using models and literature was provided  Verbal and written consent was obtained  My impressions and treatment recommendations were discussed in detail with the patient who verbalized understanding and had no further questions  Discharge instructions were provided  I personally saw and examined the patient and I agree with the above discussed plan of care  Orders Placed This Encounter   Procedures    FL spine and pain procedure     Standing Status:   Future     Standing Expiration Date:   11/19/2022     Order Specific Question:   Reason for Exam:     Answer:   Bilateral L4 TF JAMEL     Order Specific Question:   Is the patient pregnant? Answer:   No     Order Specific Question:   Anticoagulant hold needed?      Answer:   No     New Medications Ordered This Visit   Medications    gabapentin (NEURONTIN) 300 mg capsule     Sig: Take 1 capsule (300 mg total) by mouth daily at bedtime for 30 days     Dispense:  30 capsule     Refill:  0       History of Present Illness:    Maricel Leroy is a 52 y o  female who presents for in regards to bilateral lower extremity pain  Symptoms have been present for 2 and half years without any precipitating injury or trauma  Pain is moderate to severe rated 10/10 on numeric rating scale and felt constantly  Symptoms described to be sharp, pressure-like, throbbing, burning, cramping, shooting with numbness and paresthesias that radiates down both legs  Symptoms are aggravated with any position and decreased with sitting  There is no change with coughing, sneezing or bowel movements  Treatment history has included physical therapy which provided no relief  She is currently on meloxicam 15 mg daily which provides no relief  I have personally reviewed and/or updated the patient's past medical history, past surgical history, family history, social history, current medications, allergies, and vital signs today  Review of Systems:    Review of Systems   Constitutional: Negative for fever and unexpected weight change  HENT: Negative for trouble swallowing  Eyes: Negative for visual disturbance  Respiratory: Negative for shortness of breath and wheezing  Cardiovascular: Negative for chest pain and palpitations  Gastrointestinal: Negative for constipation, diarrhea, nausea and vomiting  Endocrine: Negative for cold intolerance, heat intolerance and polydipsia  Genitourinary: Negative for difficulty urinating and frequency  Musculoskeletal: Positive for gait problem and joint swelling  Negative for arthralgias and myalgias  Skin: Negative for rash  Neurological: Positive for weakness  Negative for dizziness, seizures, syncope and headaches  Hematological: Does not bruise/bleed easily  Psychiatric/Behavioral: Negative for dysphoric mood  All other systems reviewed and are negative        Patient Active Problem List   Diagnosis    Lumbar stenosis with neurogenic claudication       Past Medical History: Diagnosis Date    Hypertension     Rheumat[address] [address]        Past Surgical History:   Procedure Laterality Date     SECTION         Family History   Problem Relation Age of Onset    Heart disease Mother     Hypertension Mother     Diabetes Mother        Social History     Occupational History    Not on file  Social History Main Topics    Smoking status: Former Smoker    Smokeless tobacco: Never Used    Alcohol use Yes      Comment: Occasionally    Drug use: No    Sexual activity: Not on file       Current Outpatient Prescriptions on File Prior to Visit   Medication Sig    Acetaminophen (TYLENOL ARTHRITIS PAIN PO) Take by mouth    amLODIPine-benazepril (LOTREL) 10-40 MG per capsule     atorvastatin (LIPITOR) 20 mg tablet     meloxicam (MOBIC) 15 mg tablet     Multiple Vitamins-Minerals (CENTRUM SILVER PO) Take by mouth    pantoprazole (PROTONIX) 40 mg tablet      No current facility-administered medications on file prior to visit  Allergies   Allergen Reactions    Sulfa Antibiotics        Physical Exam:    /76   Pulse 90   Temp 98 7 °F (37 1 °C)   Ht 5' 2" (1 575 m)   Wt 93 kg (205 lb)   BMI 37 49 kg/m²     Constitutional: normal, well developed, well nourished, alert, in no distress and non-toxic and no overt pain behavior   and obese  Eyes: anicteric  HEENT: grossly intact  Neck: supple, symmetric, trachea midline and no masses   Pulmonary:even and unlabored  Cardiovascular:No edema or pitting edema present  Skin:Normal without rashes or lesions and well hydrated  Psychiatric:Mood and affect appropriate  Neurologic:Cranial Nerves II-XII grossly intact  Musculoskeletal:normal     Lumbar Spine Exam  Appearance:  Normal lordosis  Palpation/Tenderness:  no tenderness or spasm  Sensory:  no sensory deficits noted  Range of Motion:  Flexion:  No limitation  without pain  Extension:  Moderately limited  with pain  Lateral Flexion - Left:  Minimally limited  with pain  Lateral Flexion - Right:  Minimally limited  with pain  Rotation - Left:  No limitation  without pain  Rotation - Right:  No limitation  without pain  Motor Strength:  Left hip flexion:  5/5  Left hip extension:  5/5  Right hip flexion:  5/5  Right hip extension:  5/5  Left knee flexion:  5/5  Left knee extension:  5/5  Right knee flexion:  5/5  Right knee extension:  5/5  Left foot dorsiflexion:  5/5  Left foot plantar flexion:  5/5  Right foot dorsiflexion:  5/5  Right foot plantar flexion:  5/5  Reflexes:  Left Patellar:  2+   Right Patellar:  2+   Left Achilles:  2+   Right Achilles:  2+   Special Tests:  Left Straight Leg Test:  positive  Right Straight Leg Test:  positive  Left Randolph's Maneuver:  negative  Right Randolph's Maneuver:  negative    Imaging    MRI LUMBAR SPINE WITHOUT CONTRAST (10/23/2018)     INDICATION: Chronic bilateral leg pain        COMPARISON:  None      TECHNIQUE:  Sagittal T1, sagittal T2, sagittal inversion recovery, axial T1 and axial T2, coronal T2        IMAGE QUALITY:  Diagnostic     FINDINGS:     ALIGNMENT:  Slight anterolisthesis L4 on 5 measures approximately 5 mm      MARROW SIGNAL:  Hemangioma L1      DISTAL CORD AND CONUS:  Normal size and signal within the distal cord and conus  The conus ends at the L1-L2 level      PARASPINAL SOFT TISSUES:  Paraspinal soft tissues are unremarkable      SACRUM:  Normal signal within the sacrum  No evidence of insufficiency or stress fracture      LOWER THORACIC DISC SPACES:  Normal disc height and signal   No disc herniation, canal stenosis or foraminal narrowing      LUMBAR DISC SPACES:     L1-L2:  Mild bulge  No significant central or foraminal narrowing      L2-L3:  No significant central or foraminal narrowing with small right-sided marginal osteophyte      L3-L4:  Mild facet hypertrophy  Mild annular bulging with small marginal osteophyte on the left  There is mild left foraminal narrowing    Disc material approaches but does not displace the extraforaminal left L3 nerve root      L4-L5:  Severe facet degenerative change accounts for slight anterolisthesis  Moderate to severe central stenosis with severe right lateral recess stenosis  Correlate for right L5 radiculopathy      L5-S1:  Severe facet degenerative change  No significant central or foraminal narrowing      IMPRESSION:     Severe facet degenerative change L4-5 and L5-S1 accounts for 5 mm anterolisthesis L4-5      Moderate to severe central stenosis and severe right lateral recess stenosis L4-5  Correlate for right L5 radiculopathy      Degenerative changes L3-4 noted with disc material approaching but not displacing the extraforaminal left L3 nerve root  XR BILATERAL HIPS AND PELVIS (9/18/2018)     INDICATION:   M79 651: Pain in right thigh  M79 652: Pain in left thigh  M25 551: Pain in right hip  M25 552: Pain in left hip      COMPARISON:  2/24/2014     VIEWS:  XR HIPS BILATERAL 2 VW W PELVIS IF PERFORMED        FINDINGS:  The bony pelvis appears intact  Visualized lower lumbar spine is unremarkable      LEFT HIP:  No significant hip degenerative changes  Joint space alignment is maintained  Soft tissues are unremarkable      RIGHT HIP:  No significant hip degenerative changes  Joint space alignment is maintained     Soft tissues are unremarkable

## 2018-11-19 NOTE — TELEPHONE ENCOUNTER
Dr Arroyo Parent    Patient forgot to ask if she should continue therapy because it is not helping  It hurts more  Is asking if she should go to her appt tomorrow 11/20/18 or not  Patient is asking for a call back to discuss this

## 2018-11-20 ENCOUNTER — APPOINTMENT (OUTPATIENT)
Dept: PHYSICAL THERAPY | Facility: MEDICAL CENTER | Age: 53
End: 2018-11-20
Payer: COMMERCIAL

## 2018-11-26 ENCOUNTER — TELEPHONE (OUTPATIENT)
Dept: PAIN MEDICINE | Facility: MEDICAL CENTER | Age: 53
End: 2018-11-26

## 2018-11-26 NOTE — TELEPHONE ENCOUNTER
Pt left a voicemail today @1:02p requesting a c/b to schedule procedure   Pt can be reached at [phone]

## 2018-11-27 ENCOUNTER — APPOINTMENT (OUTPATIENT)
Dept: PHYSICAL THERAPY | Facility: MEDICAL CENTER | Age: 53
End: 2018-11-27
Payer: COMMERCIAL

## 2018-11-28 NOTE — TELEPHONE ENCOUNTER
Pt called back stating she still has not received a call back to schedule the injection  She can be reached at [phone]

## 2018-11-30 ENCOUNTER — APPOINTMENT (OUTPATIENT)
Dept: PHYSICAL THERAPY | Facility: MEDICAL CENTER | Age: 53
End: 2018-11-30
Payer: COMMERCIAL

## 2018-12-11 ENCOUNTER — HOSPITAL ENCOUNTER (OUTPATIENT)
Dept: RADIOLOGY | Facility: CLINIC | Age: 53
Discharge: HOME | End: 2018-12-11
Attending: ANESTHESIOLOGY | Admitting: ANESTHESIOLOGY
Payer: COMMERCIAL

## 2018-12-11 VITALS
DIASTOLIC BLOOD PRESSURE: 81 MMHG | OXYGEN SATURATION: 98 % | TEMPERATURE: 98.2 F | RESPIRATION RATE: 20 BRPM | HEART RATE: 91 BPM | SYSTOLIC BLOOD PRESSURE: 132 MMHG

## 2018-12-11 DIAGNOSIS — M48.062 LUMBAR STENOSIS WITH NEUROGENIC CLAUDICATION: ICD-10-CM

## 2018-12-11 PROCEDURE — 64483 NJX AA&/STRD TFRM EPI L/S 1: CPT | Performed by: ANESTHESIOLOGY

## 2018-12-11 RX ORDER — METHYLPREDNISOLONE ACETATE 80 MG/ML
80 INJECTION, SUSPENSION INTRA-ARTICULAR; INTRALESIONAL; INTRAMUSCULAR; PARENTERAL; SOFT TISSUE ONCE
Status: COMPLETED | OUTPATIENT
Start: 2018-12-11 | End: 2018-12-11

## 2018-12-11 RX ORDER — 0.9 % SODIUM CHLORIDE 0.9 %
10 VIAL (ML) INJECTION ONCE
Status: COMPLETED | OUTPATIENT
Start: 2018-12-11 | End: 2018-12-11

## 2018-12-11 RX ORDER — BUPIVACAINE HCL/PF 2.5 MG/ML
30 VIAL (ML) INJECTION ONCE
Status: COMPLETED | OUTPATIENT
Start: 2018-12-11 | End: 2018-12-11

## 2018-12-11 RX ADMIN — Medication 2 ML: at 13:56

## 2018-12-11 RX ADMIN — IOHEXOL 1 ML: 300 INJECTION, SOLUTION INTRAVENOUS at 13:56

## 2018-12-11 RX ADMIN — SODIUM CHLORIDE 4 ML: 9 INJECTION, SOLUTION INTRAMUSCULAR; INTRAVENOUS; SUBCUTANEOUS at 13:54

## 2018-12-11 RX ADMIN — Medication 4 ML: at 13:54

## 2018-12-11 RX ADMIN — METHYLPREDNISOLONE ACETATE 80 MG: 80 INJECTION, SUSPENSION INTRA-ARTICULAR; INTRALESIONAL; INTRAMUSCULAR; PARENTERAL; SOFT TISSUE at 13:56

## 2018-12-11 NOTE — DISCHARGE INSTR - LAB
Epidural Steroid Injection   WHAT YOU NEED TO KNOW:   An epidural steroid injection (JAMEL) is a procedure to inject steroid medicine into the epidural space  The epidural space is between your spinal cord and vertebrae  Steroids reduce inflammation and fluid buildup in your spine that may be causing pain  You may be given pain medicine along with the steroids  ACTIVITY  · Do not drive or operate machinery today  · No strenuous activity today - bending, lifting, etc   · You may resume normal activites starting tomorrow - start slowly and as tolerated  · You may shower today, but no tub baths or hot tubs  · You may have numbness for several hours from the local anesthetic  Please use caution and common sense, especially with weight-bearing activities  CARE OF THE INJECTION SITE  · If you have soreness or pain, apply ice to the area today (20 minutes on/20 minutes off)  · Starting tomorrow, you may use warm, moist heat or ice if needed  · You may have an increase or change in your discomfort for 36-48 hours after your treatment  · Apply ice and continue with any pain medication you have been prescribed  · Notify [facility] if you have any of the following: redness, drainage, swelling, headache, stiff neck or fever above 100°F     SPECIAL INSTRUCTIONS  · Our office will contact you in approximately 7 days for a progress report  MEDICATIONS  · Continue to take all routine medications  · Our office may have instructed you to hold some medications  If you have a problem specifically related to your procedure, please call our office at [phone]  Problems not related to your procedure should be directed to your primary care physician

## 2018-12-11 NOTE — H&P
History of Present Illness: The patient is a 52 y o  female who presents with complaints of lower back and leg pain secondary to spinal stenosis and is here today for bilateral L4 transforaminal epidural steroid injection  Patient Active Problem List   Diagnosis    Lumbar stenosis with neurogenic claudication       Past Medical History:   Diagnosis Date    Hypertension     Rheumatoid arthritis ([address] )        Past Surgical History:   Procedure Laterality Date     SECTION           Current Outpatient Prescriptions:     Acetaminophen (TYLENOL ARTHRITIS PAIN PO), Take by mouth, Disp: , Rfl:     amLODIPine-benazepril (LOTREL) 10-40 MG per capsule, , Disp: , Rfl:     atorvastatin (LIPITOR) 20 mg tablet, , Disp: , Rfl:     gabapentin (NEURONTIN) 300 mg capsule, Take 1 capsule (300 mg total) by mouth daily at bedtime for 30 days, Disp: 30 capsule, Rfl: 0    meloxicam (MOBIC) 15 mg tablet, , Disp: , Rfl:     Multiple Vitamins-Minerals (CENTRUM SILVER PO), Take by mouth, Disp: , Rfl:     pantoprazole (PROTONIX) 40 mg tablet, , Disp: , Rfl:     Allergies   Allergen Reactions    Sulfa Antibiotics        Physical Exam:   Vitals:    18 1337   BP: 123/84   Pulse: 88   Resp: 20   Temp: 98 2 °F (36 8 °C)   SpO2: 98%     General: Awake, Alert, Oriented x 3, Mood and affect appropriate  Respiratory: Respirations even and unlabored  Cardiovascular: Peripheral pulses intact; no edema  Musculoskeletal Exam:   Lower back tenderness    ASA Score: 2    Patient/Chart Verification  Patient ID Verified: Verbal  ID Band Applied: No  Consents Confirmed: Procedural, To be obtained in the Pre-Procedure area  H&P( within 30 days) Verified: To be obtained in the Pre-Procedure area  Interval H&P(within 24 hr) Complete (required for Outpatients and Surgery Admit only): To be obtained in the Pre-Procedure area  Allergies Reviewed: Yes  Anticoag/NSAID held?: No  Currently on antibiotics?: No    Assessment:   1   Lumbar stenosis with neurogenic claudication        Plan: Bilateral L4 TF JAMEL

## 2018-12-18 ENCOUNTER — TELEPHONE (OUTPATIENT)
Dept: PAIN MEDICINE | Facility: CLINIC | Age: 53
End: 2018-12-18

## 2018-12-20 NOTE — TELEPHONE ENCOUNTER
Patient called stating her percentage of improvement is 90 and her pain level is 1      Alfred Blood patient

## 2019-01-08 ENCOUNTER — TELEPHONE (OUTPATIENT)
Dept: PAIN MEDICINE | Facility: MEDICAL CENTER | Age: 54
End: 2019-01-08

## 2019-01-08 NOTE — TELEPHONE ENCOUNTER
Pt left a voicemail today @ 11:10a stating that she is now having pain and would like to schedule another set of injections   Pt can be reached at [phone]

## 2019-01-11 NOTE — TELEPHONE ENCOUNTER
Patient is calling stating that she is starting to have pain about a week ago  It is in her upper leg  She is asking what to do?

## 2019-01-11 NOTE — TELEPHONE ENCOUNTER
I s/w pt for update of current symptoms; for the past week she has been having pain of both anterior thighs, pain level 6-7/10, she reports a little LB pain  Pt asking if what she can do, another inj? S/P B/L L4 TFESI from 12/11/18  Pls advise

## 2019-01-31 ENCOUNTER — HOSPITAL ENCOUNTER (OUTPATIENT)
Dept: RADIOLOGY | Facility: CLINIC | Age: 54
Discharge: HOME | End: 2019-01-31
Attending: ANESTHESIOLOGY | Admitting: ANESTHESIOLOGY
Payer: COMMERCIAL

## 2019-01-31 VITALS
HEART RATE: 82 BPM | OXYGEN SATURATION: 99 % | TEMPERATURE: 97.8 F | DIASTOLIC BLOOD PRESSURE: 91 MMHG | SYSTOLIC BLOOD PRESSURE: 140 MMHG | RESPIRATION RATE: 20 BRPM

## 2019-01-31 DIAGNOSIS — M48.062 SPINAL STENOSIS, LUMBAR REGION, WITH NEUROGENIC CLAUDICATION: ICD-10-CM

## 2019-01-31 DIAGNOSIS — M48.062 LUMBAR STENOSIS WITH NEUROGENIC CLAUDICATION: ICD-10-CM

## 2019-01-31 PROCEDURE — 64483 NJX AA&/STRD TFRM EPI L/S 1: CPT | Performed by: ANESTHESIOLOGY

## 2019-01-31 RX ORDER — 0.9 % SODIUM CHLORIDE 0.9 %
10 VIAL (ML) INJECTION ONCE
Status: COMPLETED | OUTPATIENT
Start: 2019-01-31 | End: 2019-01-31

## 2019-01-31 RX ORDER — GABAPENTIN 300 MG/1
300 CAPSULE ORAL
Qty: 30 CAPSULE | Refills: 2 | Status: SHIPPED | OUTPATIENT
Start: 2019-01-31 | End: 2019-05-03 | Stop reason: SDUPTHER

## 2019-01-31 RX ORDER — BUPIVACAINE HCL/PF 2.5 MG/ML
10 VIAL (ML) INJECTION ONCE
Status: COMPLETED | OUTPATIENT
Start: 2019-01-31 | End: 2019-01-31

## 2019-01-31 RX ORDER — METHYLPREDNISOLONE ACETATE 80 MG/ML
80 INJECTION, SUSPENSION INTRA-ARTICULAR; INTRALESIONAL; INTRAMUSCULAR; PARENTERAL; SOFT TISSUE ONCE
Status: COMPLETED | OUTPATIENT
Start: 2019-01-31 | End: 2019-01-31

## 2019-01-31 RX ADMIN — BUPIVACAINE HYDROCHLORIDE 2 ML: 2.5 INJECTION, SOLUTION EPIDURAL; INFILTRATION; INTRACAUDAL at 09:32

## 2019-01-31 RX ADMIN — Medication 5 ML: at 09:31

## 2019-01-31 RX ADMIN — SODIUM CHLORIDE 5 ML: 9 INJECTION, SOLUTION INTRAMUSCULAR; INTRAVENOUS; SUBCUTANEOUS at 09:31

## 2019-01-31 RX ADMIN — METHYLPREDNISOLONE ACETATE 80 MG: 80 INJECTION, SUSPENSION INTRA-ARTICULAR; INTRALESIONAL; INTRAMUSCULAR; PARENTERAL; SOFT TISSUE at 09:32

## 2019-01-31 RX ADMIN — IOHEXOL 1 ML: 300 INJECTION, SOLUTION INTRAVENOUS at 09:32

## 2019-01-31 NOTE — DISCHARGE INSTR - LAB
Epidural Steroid Injection   WHAT YOU NEED TO KNOW:   An epidural steroid injection (JAMEL) is a procedure to inject steroid medicine into the epidural space  The epidural space is between your spinal cord and vertebrae  Steroids reduce inflammation and fluid buildup in your spine that may be causing pain  You may be given pain medicine along with the steroids  ACTIVITY  · Do not drive or operate machinery today  · No strenuous activity today - bending, lifting, etc   · You may resume normal activites starting tomorrow - start slowly and as tolerated  · You may shower today, but no tub baths or hot tubs  · You may have numbness for several hours from the local anesthetic  Please use caution and common sense, especially with weight-bearing activities  CARE OF THE INJECTION SITE  · If you have soreness or pain, apply ice to the area today (20 minutes on/20 minutes off)  · Starting tomorrow, you may use warm, moist heat or ice if needed  · You may have an increase or change in your discomfort for 36-48 hours after your treatment  · Apply ice and continue with any pain medication you have been prescribed  · Notify [facility] if you have any of the following: redness, drainage, swelling, headache, stiff neck or fever above 100°F     SPECIAL INSTRUCTIONS  · Our office will contact you in approximately 7 days for a progress report  MEDICATIONS  · Continue to take all routine medications  · Our office may have instructed you to hold some medications  If you have a problem specifically related to your procedure, please call our office at [phone]  Problems not related to your procedure should be directed to your primary care physician

## 2019-01-31 NOTE — H&P
History of Present Illness: The patient is a 52 y o  female who presents with complaints of lower back and leg pain secondary to lumbar spinal stenosis and is here today for bilateral L4 transforaminal epidural steroid injection      Patient Active Problem List   Diagnosis    Lumbar stenosis with neurogenic claudication       Past Medical History:   Diagnosis Date    Hypertension     Rheumatoid arthritis ([address] )        Past Surgical History:   Procedure Laterality Date     SECTION           Current Outpatient Prescriptions:     Acetaminophen (TYLENOL ARTHRITIS PAIN PO), Take by mouth, Disp: , Rfl:     amLODIPine-benazepril (LOTREL) 10-40 MG per capsule, , Disp: , Rfl:     atorvastatin (LIPITOR) 20 mg tablet, , Disp: , Rfl:     gabapentin (NEURONTIN) 300 mg capsule, Take 1 capsule (300 mg total) by mouth daily at bedtime for 30 days, Disp: 30 capsule, Rfl: 0    meloxicam (MOBIC) 15 mg tablet, , Disp: , Rfl:     Multiple Vitamins-Minerals (CENTRUM SILVER PO), Take by mouth, Disp: , Rfl:     pantoprazole (PROTONIX) 40 mg tablet, , Disp: , Rfl:     Current Facility-Administered Medications:     bupivacaine (PF) (MARCAINE) 0 25 % injection 10 mL, 10 mL, Epidural, Once, Torri Carrero MD    iohexol (OMNIPAQUE) 300 mg/mL injection 50 mL, 50 mL, Epidural, Once, Torri Carrero MD    lidocaine (PF) (XYLOCAINE-MPF) 2 % injection 5 mL, 5 mL, Infiltration, Once, Torri Carrero MD    methylPREDNISolone acetate (DEPO-MEDROL) injection 80 mg, 80 mg, Epidural, Once, Torri Carrero MD    sodium chloride (PF) 0 9 % injection 10 mL, 10 mL, Infiltration, Once, Torri Carrero MD    Allergies   Allergen Reactions    Sulfa Antibiotics        Physical Exam:   Vitals:    19 0916   BP: 132/87   Pulse: 79   Resp: 20   Temp: 97 8 °F (36 6 °C)   SpO2: 100%     General: Awake, Alert, Oriented x 3, Mood and affect appropriate  Respiratory: Respirations even and unlabored  Cardiovascular: Peripheral pulses intact; no edema  Musculoskeletal Exam:   Lower back tenderness    ASA Score: 2    Patient/Chart Verification  Patient ID Verified: Verbal  Consents Confirmed: Procedural, To be obtained in the Pre-Procedure area  H&P( within 30 days) Verified: To be obtained in the Pre-Procedure area  Allergies Reviewed: Yes  Anticoag/NSAID held?: NA  Currently on antibiotics?: No    Assessment:   1   Spinal stenosis, lumbar region, with neurogenic claudication        Plan: B/L L4 TFESI

## 2019-02-07 ENCOUNTER — TELEPHONE (OUTPATIENT)
Dept: PAIN MEDICINE | Facility: CLINIC | Age: 54
End: 2019-02-07

## 2019-02-19 NOTE — TELEPHONE ENCOUNTER
Patient  [phone]    Dr Michel He    2-3/10 pain level  25% improvement  Felling much better  Everything is ok  Will call back if things worsen

## 2019-03-20 ENCOUNTER — OFFICE VISIT (OUTPATIENT)
Dept: RHEUMATOLOGY | Facility: CLINIC | Age: 54
End: 2019-03-20
Payer: COMMERCIAL

## 2019-03-20 VITALS
WEIGHT: 218.2 LBS | HEIGHT: 62 IN | SYSTOLIC BLOOD PRESSURE: 118 MMHG | HEART RATE: 84 BPM | BODY MASS INDEX: 40.15 KG/M2 | DIASTOLIC BLOOD PRESSURE: 82 MMHG

## 2019-03-20 DIAGNOSIS — M25.542 ARTHRALGIA OF BOTH HANDS: ICD-10-CM

## 2019-03-20 DIAGNOSIS — M25.50 POLYARTHRALGIA: ICD-10-CM

## 2019-03-20 DIAGNOSIS — M25.541 ARTHRALGIA OF BOTH HANDS: ICD-10-CM

## 2019-03-20 DIAGNOSIS — M79.604 BILATERAL LEG PAIN: ICD-10-CM

## 2019-03-20 DIAGNOSIS — M79.605 BILATERAL LEG PAIN: ICD-10-CM

## 2019-03-20 DIAGNOSIS — M48.062 LUMBAR STENOSIS WITH NEUROGENIC CLAUDICATION: Primary | ICD-10-CM

## 2019-03-20 PROCEDURE — 99204 OFFICE O/P NEW MOD 45 MIN: CPT | Performed by: INTERNAL MEDICINE

## 2019-03-20 RX ORDER — AMOXICILLIN AND CLAVULANATE POTASSIUM 500; 125 MG/1; MG/1
TABLET, FILM COATED ORAL
COMMUNITY
Start: 2018-12-26 | End: 2019-03-20 | Stop reason: ALTCHOICE

## 2019-03-20 NOTE — PROGRESS NOTES
Assessment and Plan:   Patient is a 15-year-old female with lumbar spinal stenosis with neurogenic claudication symptoms in her life, who presents for rheumatology evaluation regarding arthralgias of her hands and knees  Since she was referred to few months ago she was able to see a spine and Pain Management doctors for her like pain which does correlate with her spinal stenosis and neurogenic claudication  Fortunately she did get relief from the injections but it seems they did not last her a significant amount of time  She does plan to return to them for follow-up and possible repeat injections  In terms of the arthralgias in her hands and knees my suspicion this is osteoarthritis as she does have osteoarthritic changes that are evident on exam in her hands  She lacks any evidence of rheumatoid deformities and there is no synovitis or joint swelling anywhere  We discussed this today and that I think the lab workup she had a few months ago is sufficient as there is no evidence of systemic inflammation and rheumatoid factor LILIAN were negative  To be completely will do bilateral hand x-rays to make sure we are not missing any erosive changes that would suggest RA, but overall I believe it will show osteoarthritis  I also suspect she has progressive OA in her knees and discussed with her that options are for physical therapy, along with knee injections but she does not feel the pain is bad enough at this time  This is an option in the future for her that she can have here or with her orthopedic provider that she already sees  Otherwise as long as her hand x-rays are not suggestive of inflammatory arthropathy then she does not need any additional Rheumatology evaluation or follow-up  Plan:  Diagnoses and all orders for this visit:    Lumbar stenosis with neurogenic claudication    Bilateral leg pain    Polyarthralgia    Arthralgia of both hands  -     XR hand 3+ vw left;  Future  -     XR hand 3+ vw right; Future    Other orders  -     Discontinue: amoxicillin-clavulanate (AUGMENTIN) 500-125 mg per tablet        Follow-up plan: no rheumatology follow-up needed       HPI  Kelli White is a 52 y o   female with spinal stenosis with neurogenic claudication, GERD, hypertension, hyperlipidemia who presents for rheumatology consult by request of Dr Elian Truong for bilateral leg pain and hand arthralgia  Patient reports that when she was referred here she was having bilateral leg pain with radiation down her legs  This started happening in the fall of last year and she was subsequently referred for MRI of her lumbar spine which show severe stenotic changes  She has since seen spine and [facility] who identified that her leg pain is coming from her spine and she has neurogenic claudication due to spinal stenosis  She did undergo injections for this issue and states she did feel significant improvement in her leg pain for a temporary time afterwards  However they are now starting to wear off and she is starting to have increased like pain again and she plans to call for repeat injections with them in the near future  She was also experiencing bilateral hand any arthralgias  She reports this is been ongoing for a few years  She does have stiffness in her hands but only for 5 minutes in the morning it resolved when she exercises them for a few minutes  She also has bilateral knee pain and previously had x-rays a few years ago showing the bites and mild OA  Her knees hurt more when she is on her feet all day at work  Her hands and her knees are not a major concern to her compared to her leg pain  She has been taking the meloxicam daily which she does think it helps her hand and knee arthralgia but does not help her leg pain from the spinal stenosis  She has not noticed any joint swelling      She had some autoimmune workup in the fall which showed negative LILIAN, rheumatoid factor, and normal inflammatory markers  She also had negative Lyme  Denies photosensitivity, rashes, psoriasis, sicca symptoms, oral or nasal ulcers, alopecia, Raynaud's, h/o pericarditis or pleurisy, h/o blood clots or miscarriages  Review of Systems  Review of Systems   Constitutional: Negative for chills, fatigue, fever and unexpected weight change  HENT: Negative for mouth sores and trouble swallowing  Eyes: Negative for pain and visual disturbance  Respiratory: Negative for cough and shortness of breath  Cardiovascular: Negative for chest pain and leg swelling  Gastrointestinal: Negative for abdominal pain, blood in stool, constipation, diarrhea and nausea  Genitourinary: Negative for hematuria  Musculoskeletal: Positive for arthralgias and back pain  Negative for joint swelling and myalgias  Skin: Negative for rash  Neurological: Negative for weakness and numbness  Hematological: Negative for adenopathy  Psychiatric/Behavioral: Negative for sleep disturbance         Allergies  Allergies   Allergen Reactions    Sulfa Antibiotics        Home Medications    Current Outpatient Medications:     Acetaminophen (TYLENOL ARTHRITIS PAIN PO), Take by mouth, Disp: , Rfl:     amLODIPine-benazepril (LOTREL) 10-40 MG per capsule, , Disp: , Rfl:     atorvastatin (LIPITOR) 20 mg tablet, , Disp: , Rfl:     meloxicam (MOBIC) 15 mg tablet, , Disp: , Rfl:     Multiple Vitamins-Minerals (CENTRUM SILVER PO), Take by mouth, Disp: , Rfl:     pantoprazole (PROTONIX) 40 mg tablet, , Disp: , Rfl:     gabapentin (NEURONTIN) 300 mg capsule, Take 1 capsule (300 mg total) by mouth daily at bedtime for 30 days, Disp: 30 capsule, Rfl: 0    gabapentin (NEURONTIN) 300 mg capsule, Take 1 capsule (300 mg total) by mouth daily at bedtime for 30 days, Disp: 30 capsule, Rfl: 2    Past Medical History  Past Medical History:   Diagnosis Date    Hypertension     Rheumat[facility] [address] [address]        Past Surgical History   Past Surgical History: Procedure Laterality Date     SECTION         Family History  No known family history of autoimmune or inflammatory diseases  Family History   Problem Relation Age of Onset    Heart disease Mother     Hypertension Mother     Diabetes Mother        Social History  Occupation:  at  [address] History     Substance and Sexual Activity   Alcohol Use Yes    Comment: Occasionally     Social History     Substance and Sexual Activity   Drug Use No     Social History     Tobacco Use   Smoking Status Former Smoker   Smokeless Tobacco Never Used       Objective:    Vitals:    19 0952   BP: 118/82   BP Location: Right arm   Patient Position: Sitting   Cuff Size: Adult   Pulse: 84   Weight: 99 kg (218 lb 3 2 oz)   Height: 5' 2" (1 575 m)       Physical Exam   Constitutional: She appears well-developed and well-nourished  She is cooperative  No distress  HENT:   Head: Normocephalic  Mouth/Throat: Oropharynx is clear and moist and mucous membranes are normal    Eyes: Conjunctivae and EOM are normal  No scleral icterus  Neck: No thyromegaly present  Cardiovascular: Normal rate, regular rhythm, S1 normal and S2 normal  Exam reveals no friction rub  No murmur heard  Pulmonary/Chest: Breath sounds normal  No respiratory distress  She has no wheezes  She has no rhonchi  She has no rales  Musculoskeletal:   No significant soft tissue tenderness  Peripheral joints are nontender there is no swelling or synovitis anywhere  Osteoarthritic changes in her bilateral hands with Jefferson's and Heberden's nodes on her PIP and DIP joints  Lymphadenopathy:     She has no cervical adenopathy  Neurological: She is alert  Skin: Skin is warm and dry  No rash noted  Nails show no clubbing  Psychiatric: She has a normal mood and affect  Imaging:   MRI lumbar 10/23/18: IMPRESSION:  Severe facet degenerative change L4-5 and L5-S1 accounts for 5 mm anterolisthesis L4-5    Moderate to severe central stenosis and severe right lateral recess stenosis L4-5  Correlate for right L5 radiculopathy  Degenerative changes L3-4 noted with disc material approaching but not displacing the extraforaminal left L3 nerve root  XR hips/pelvis 9/18/18: IMPRESSION:  Unremarkable hips and pelvis      Labs:   Component      Latest Ref Rng & Units 10/3/2018   WBC      4 31 - 10 16 Thousand/uL 5 85   Red Blood Cell Count      3 81 - 5 12 Million/uL 4 79   Hemoglobin      11 5 - 15 4 g/dL 12 6   HCT      34 8 - 46 1 % 39 5   MCV      82 - 98 fL 83   MCH      26 8 - 34 3 pg 26 3 (L)   MCHC      31 4 - 37 4 g/dL 31 9   RDW      11 6 - 15 1 % 15 9 (H)   MPV      8 9 - 12 7 fL 10 7   Platelet Count      504 - 390 Thousands/uL 248   nRBC      /100 WBCs 0   Neutrophils %      43 - 75 % 55   Immat GRANS %      0 - 2 % 0   Lymphocytes Relative      14 - 44 % 31   Monocytes Relative      4 - 12 % 10   Eosinophils      0 - 6 % 3   Basophils Relative      0 - 1 % 1   Absolute Neutrophils      1 85 - 7 62 Thousands/µL 3 18   Immature Grans Absolute      0 00 - 0 20 Thousand/uL 0 01   Lymphocytes Absolute      0 60 - 4 47 Thousands/µL 1 81   Absolute Monocytes      0 17 - 1 22 Thousand/µL 0 59   Absolute Eosinophils      0 00 - 0 61 Thousand/µL 0 20   Basophils Absolute      0 00 - 0 10 Thousands/µL 0 06   Sodium      136 - 145 mmol/L 133 (L)   Potassium      3 5 - 5 3 mmol/L 3 4 (L)   Chloride      100 - 108 mmol/L 97 (L)   CO2      21 - 32 mmol/L 28   Anion Gap      4 - 13 mmol/L 8   BUN      5 - 25 mg/dL 12   Creatinine      0 60 - 1 30 mg/dL 0 53 (L)   GLUCOSE FASTING      65 - 99 mg/dL 106 (H)   Calcium      8 3 - 10 1 mg/dL 8 8   AST      5 - 45 U/L 22   ALT      12 - 78 U/L 45   Alkaline Phosphatase      46 - 116 U/L 57   Total Protein      6 4 - 8 2 g/dL 6 3 (L)   Albumin      3 5 - 5 0 g/dL 3 3 (L)   TOTAL BILIRUBIN      0 20 - 1 00 mg/dL 1 13 (H)   eGFR      ml/min/1 73sq m 107   LYME AB IGG      0 00 - 0 79 0 07 LYME AB IGM      0 00 - 0 79 0 18   RHEUMATOID FACTOR      Negative Negative   ANTI-NUCLEAR ANTIBODY (LILIAN)      Negative Negative   C-REACTIVE PROTEIN      <3 0 mg/L <3 0   Sed Rate      0 - 20 mm/hour 4   Total CK      26 - 192 U/L 125   TSH 3RD GENERATON      0 358 - 3 740 uIU/mL 1 330

## 2019-03-20 NOTE — LETTER
March 20, 2019     Destiny Sheffield MD  [address] [address] [address]    Patient: aMxim Phillips   YOB: 1965   Date of Visit: 3/20/2019       Dear Dr Carmencita Boas: Thank you for referring Maxim Phillips to me for evaluation  Below are my notes for this consultation  If you have questions, please do not hesitate to call me  I look forward to following your patient along with you  Sincerely,        Lori Donaldson DO        CC: Jono Niño, DO Lori Donaldson DO  3/20/2019 10:23 AM  Sign at close encounter  Assessment and Plan:   Patient is a 52-year-old female with lumbar spinal stenosis with neurogenic claudication symptoms in her life, who presents for rheumatology evaluation regarding arthralgias of her hands and knees  Since she was referred to few months ago she was able to see a spine and Pain Management doctors for her like pain which does correlate with her spinal stenosis and neurogenic claudication  Fortunately she did get relief from the injections but it seems they did not last her a significant amount of time  She does plan to return to them for follow-up and possible repeat injections  In terms of the arthralgias in her hands and knees my suspicion this is osteoarthritis as she does have osteoarthritic changes that are evident on exam in her hands  She lacks any evidence of rheumatoid deformities and there is no synovitis or joint swelling anywhere  We discussed this today and that I think the lab workup she had a few months ago is sufficient as there is no evidence of systemic inflammation and rheumatoid factor LILIAN were negative  To be completely will do bilateral hand x-rays to make sure we are not missing any erosive changes that would suggest RA, but overall I believe it will show osteoarthritis    I also suspect she has progressive OA in her knees and discussed with her that options are for physical therapy, along with knee injections but she does not feel the pain is bad enough at this time  This is an option in the future for her that she can have here or with her orthopedic provider that she already sees  Otherwise as long as her hand x-rays are not suggestive of inflammatory arthropathy then she does not need any additional Rheumatology evaluation or follow-up  Plan:  Diagnoses and all orders for this visit:    Lumbar stenosis with neurogenic claudication    Bilateral leg pain    Polyarthralgia    Arthralgia of both hands  -     XR hand 3+ vw left; Future  -     XR hand 3+ vw right; Future    Other orders  -     Discontinue: amoxicillin-clavulanate (AUGMENTIN) 500-125 mg per tablet        Follow-up plan: no rheumatology follow-up needed       HPI  Breonna Wellington is a 52 y o   female with spinal stenosis with neurogenic claudication, GERD, hypertension, hyperlipidemia who presents for rheumatology consult by request of Dr Emilie Dean for bilateral leg pain and hand arthralgia  Patient reports that when she was referred here she was having bilateral leg pain with radiation down her legs  This started happening in the fall of last year and she was subsequently referred for MRI of her lumbar spine which show severe stenotic changes  She has since seen spine and Pain Management who identified that her leg pain is coming from her spine and she has neurogenic claudication due to spinal stenosis  She did undergo injections for this issue and states she did feel significant improvement in her leg pain for a temporary time afterwards  However they are now starting to wear off and she is starting to have increased like pain again and she plans to call for repeat injections with them in the near future  She was also experiencing bilateral hand any arthralgias  She reports this is been ongoing for a few years  She does have stiffness in her hands but only for 5 minutes in the morning it resolved when she exercises them for a few minutes    She also has bilateral knee pain and previously had x-rays a few years ago showing the bites and mild OA  Her knees hurt more when she is on her feet all day at work  Her hands and her knees are not a major concern to her compared to her leg pain  She has been taking the meloxicam daily which she does think it helps her hand and knee arthralgia but does not help her leg pain from the spinal stenosis  She has not noticed any joint swelling  She had some autoimmune workup in the fall which showed negative LILIAN, rheumatoid factor, and normal inflammatory markers  She also had negative Lyme  Denies photosensitivity, rashes, psoriasis, sicca symptoms, oral or nasal ulcers, alopecia, Raynaud's, h/o pericarditis or pleurisy, h/o blood clots or miscarriages  Review of Systems  Review of Systems   Constitutional: Negative for chills, fatigue, fever and unexpected weight change  HENT: Negative for mouth sores and trouble swallowing  Eyes: Negative for pain and visual disturbance  Respiratory: Negative for cough and shortness of breath  Cardiovascular: Negative for chest pain and leg swelling  Gastrointestinal: Negative for abdominal pain, blood in stool, constipation, diarrhea and nausea  Genitourinary: Negative for hematuria  Musculoskeletal: Positive for arthralgias and back pain  Negative for joint swelling and myalgias  Skin: Negative for rash  Neurological: Negative for weakness and numbness  Hematological: Negative for adenopathy  Psychiatric/Behavioral: Negative for sleep disturbance         Allergies  Allergies   Allergen Reactions    Sulfa Antibiotics        Home Medications    Current Outpatient Medications:     Acetaminophen (TYLENOL ARTHRITIS PAIN PO), Take by mouth, Disp: , Rfl:     amLODIPine-benazepril (LOTREL) 10-40 MG per capsule, , Disp: , Rfl:     atorvastatin (LIPITOR) 20 mg tablet, , Disp: , Rfl:     meloxicam (MOBIC) 15 mg tablet, , Disp: , Rfl:     Multiple Vitamins-Minerals (CENTRUM SILVER PO), Take by mouth, Disp: , Rfl:     pantoprazole (PROTONIX) 40 mg tablet, , Disp: , Rfl:     gabapentin (NEURONTIN) 300 mg capsule, Take 1 capsule (300 mg total) by mouth daily at bedtime for 30 days, Disp: 30 capsule, Rfl: 0    gabapentin (NEURONTIN) 300 mg capsule, Take 1 capsule (300 mg total) by mouth daily at bedtime for 30 days, Disp: 30 capsule, Rfl: 2    Past Medical History  Past Medical History:   Diagnosis Date    Hypertension     Rheumatoid arthritis ([address] )        Past Surgical History   Past Surgical History:   Procedure Laterality Date     SECTION         Family History  No known family history of autoimmune or inflammatory diseases  Family History   Problem Relation Age of Onset    Heart disease Mother     Hypertension Mother     Diabetes Mother        Social History  Occupation:  at  [address] History     Substance and Sexual Activity   Alcohol Use Yes    Comment: Occasionally     Social History     Substance and Sexual Activity   Drug Use No     Social History     Tobacco Use   Smoking Status Former Smoker   Smokeless Tobacco Never Used       Objective:    Vitals:    19 0952   BP: 118/82   BP Location: Right arm   Patient Position: Sitting   Cuff Size: Adult   Pulse: 84   Weight: 99 kg (218 lb 3 2 oz)   Height: 5' 2" (1 575 m)       Physical Exam   Constitutional: She appears well-developed and well-nourished  She is cooperative  No distress  HENT:   Head: Normocephalic  Mouth/Throat: Oropharynx is clear and moist and mucous membranes are normal    Eyes: Conjunctivae and EOM are normal  No scleral icterus  Neck: No thyromegaly present  Cardiovascular: Normal rate, regular rhythm, S1 normal and S2 normal  Exam reveals no friction rub  No murmur heard  Pulmonary/Chest: Breath sounds normal  No respiratory distress  She has no wheezes  She has no rhonchi  She has no rales  Musculoskeletal:   No significant soft tissue tenderness  Peripheral joints are nontender there is no swelling or synovitis anywhere  Osteoarthritic changes in her bilateral hands with Jefferson's and Heberden's nodes on her PIP and DIP joints  Lymphadenopathy:     She has no cervical adenopathy  Neurological: She is alert  Skin: Skin is warm and dry  No rash noted  Nails show no clubbing  Psychiatric: She has a normal mood and affect  Imaging:   MRI lumbar 10/23/18: IMPRESSION:  Severe facet degenerative change L4-5 and L5-S1 accounts for 5 mm anterolisthesis L4-5  Moderate to severe central stenosis and severe right lateral recess stenosis L4-5  Correlate for right L5 radiculopathy  Degenerative changes L3-4 noted with disc material approaching but not displacing the extraforaminal left L3 nerve root  XR hips/pelvis 9/18/18: IMPRESSION:  Unremarkable hips and pelvis      Labs:   Component      Latest Ref Rng & Units 10/3/2018   WBC      4 31 - 10 16 Thousand/uL 5 85   Red Blood Cell Count      3 81 - 5 12 Million/uL 4 79   Hemoglobin      11 5 - 15 4 g/dL 12 6   HCT      34 8 - 46 1 % 39 5   MCV      82 - 98 fL 83   MCH      26 8 - 34 3 pg 26 3 (L)   MCHC      31 4 - 37 4 g/dL 31 9   RDW      11 6 - 15 1 % 15 9 (H)   MPV      8 9 - 12 7 fL 10 7   Platelet Count      407 - 390 Thousands/uL 248   nRBC      /100 WBCs 0   Neutrophils %      43 - 75 % 55   Immat GRANS %      0 - 2 % 0   Lymphocytes Relative      14 - 44 % 31   Monocytes Relative      4 - 12 % 10   Eosinophils      0 - 6 % 3   Basophils Relative      0 - 1 % 1   Absolute Neutrophils      1 85 - 7 62 Thousands/µL 3 18   Immature Grans Absolute      0 00 - 0 20 Thousand/uL 0 01   Lymphocytes Absolute      0 60 - 4 47 Thousands/µL 1 81   Absolute Monocytes      0 17 - 1 22 Thousand/µL 0 59   Absolute Eosinophils      0 00 - 0 61 Thousand/µL 0 20   Basophils Absolute      0 00 - 0 10 Thousands/µL 0 06   Sodium      136 - 145 mmol/L 133 (L)   Potassium      3 5 - 5 3 mmol/L 3 4 (L)   Chloride 100 - 108 mmol/L 97 (L)   CO2      21 - 32 mmol/L 28   Anion Gap      4 - 13 mmol/L 8   BUN      5 - 25 mg/dL 12   Creatinine      0 60 - 1 30 mg/dL 0 53 (L)   GLUCOSE FASTING      65 - 99 mg/dL 106 (H)   Calcium      8 3 - 10 1 mg/dL 8 8   AST      5 - 45 U/L 22   ALT      12 - 78 U/L 45   Alkaline Phosphatase      46 - 116 U/L 57   Total Protein      6 4 - 8 2 g/dL 6 3 (L)   Albumin      3 5 - 5 0 g/dL 3 3 (L)   TOTAL BILIRUBIN      0 20 - 1 00 mg/dL 1 13 (H)   eGFR      ml/min/1 73sq m 107   LYME AB IGG      0 00 - 0 79 0 07   LYME AB IGM      0 00 - 0 79 0 18   RHEUMATOID FACTOR      Negative Negative   ANTI-NUCLEAR ANTIBODY (LILIAN)      Negative Negative   C-REACTIVE PROTEIN      <3 0 mg/L <3 0   Sed Rate      0 - 20 mm/hour 4   Total CK      26 - 192 U/L 125   TSH 3RD GENERATON      0 358 - 3 740 uIU/mL 1 330

## 2019-03-26 ENCOUNTER — APPOINTMENT (OUTPATIENT)
Dept: RADIOLOGY | Facility: MEDICAL CENTER | Age: 54
End: 2019-03-26
Payer: COMMERCIAL

## 2019-03-26 DIAGNOSIS — M25.542 ARTHRALGIA OF BOTH HANDS: ICD-10-CM

## 2019-03-26 DIAGNOSIS — M25.541 ARTHRALGIA OF BOTH HANDS: ICD-10-CM

## 2019-03-26 PROCEDURE — 73130 X-RAY EXAM OF HAND: CPT

## 2019-04-26 ENCOUNTER — APPOINTMENT (OUTPATIENT)
Dept: LAB | Facility: MEDICAL CENTER | Age: 54
End: 2019-04-26
Payer: COMMERCIAL

## 2019-04-26 ENCOUNTER — TRANSCRIBE ORDERS (OUTPATIENT)
Dept: ADMINISTRATIVE | Facility: HOSPITAL | Age: 54
End: 2019-04-26

## 2019-04-26 DIAGNOSIS — E87.1 HYPOSMOLALITY SYNDROME: ICD-10-CM

## 2019-04-26 DIAGNOSIS — E87.6 HYPOPOTASSEMIA: Primary | ICD-10-CM

## 2019-04-26 LAB
ANION GAP SERPL CALCULATED.3IONS-SCNC: 4 MMOL/L (ref 4–13)
BUN SERPL-MCNC: 17 MG/DL (ref 5–25)
CALCIUM SERPL-MCNC: 9.4 MG/DL (ref 8.3–10.1)
CHLORIDE SERPL-SCNC: 106 MMOL/L (ref 100–108)
CO2 SERPL-SCNC: 30 MMOL/L (ref 21–32)
CREAT SERPL-MCNC: 0.65 MG/DL (ref 0.6–1.3)
GFR SERPL CREATININE-BSD FRML MDRD: 100 ML/MIN/1.73SQ M
GLUCOSE P FAST SERPL-MCNC: 99 MG/DL (ref 65–99)
POTASSIUM SERPL-SCNC: 4.1 MMOL/L (ref 3.5–5.3)
SODIUM SERPL-SCNC: 140 MMOL/L (ref 136–145)

## 2019-04-26 PROCEDURE — 80048 BASIC METABOLIC PNL TOTAL CA: CPT | Performed by: FAMILY MEDICINE

## 2019-04-26 PROCEDURE — 36415 COLL VENOUS BLD VENIPUNCTURE: CPT | Performed by: FAMILY MEDICINE

## 2019-05-03 DIAGNOSIS — M48.062 LUMBAR STENOSIS WITH NEUROGENIC CLAUDICATION: ICD-10-CM

## 2019-05-04 ENCOUNTER — TELEPHONE (OUTPATIENT)
Dept: OBGYN CLINIC | Facility: HOSPITAL | Age: 54
End: 2019-05-04

## 2019-05-06 RX ORDER — GABAPENTIN 300 MG/1
CAPSULE ORAL
Qty: 30 CAPSULE | Refills: 2 | Status: SHIPPED | OUTPATIENT
Start: 2019-05-06 | End: 2019-06-11 | Stop reason: SDUPTHER

## 2019-06-11 ENCOUNTER — OFFICE VISIT (OUTPATIENT)
Dept: PAIN MEDICINE | Facility: CLINIC | Age: 54
End: 2019-06-11
Payer: COMMERCIAL

## 2019-06-11 VITALS — DIASTOLIC BLOOD PRESSURE: 78 MMHG | HEART RATE: 74 BPM | SYSTOLIC BLOOD PRESSURE: 126 MMHG | TEMPERATURE: 97.9 F

## 2019-06-11 DIAGNOSIS — M54.16 LUMBAR RADICULOPATHY: ICD-10-CM

## 2019-06-11 DIAGNOSIS — M48.062 LUMBAR STENOSIS WITH NEUROGENIC CLAUDICATION: Primary | ICD-10-CM

## 2019-06-11 PROCEDURE — 99214 OFFICE O/P EST MOD 30 MIN: CPT | Performed by: NURSE PRACTITIONER

## 2019-06-11 RX ORDER — GABAPENTIN 300 MG/1
CAPSULE ORAL
Qty: 60 CAPSULE | Refills: 0 | Status: SHIPPED | OUTPATIENT
Start: 2019-06-11 | End: 2020-02-06

## 2019-07-02 ENCOUNTER — HOSPITAL ENCOUNTER (OUTPATIENT)
Dept: RADIOLOGY | Facility: CLINIC | Age: 54
Discharge: HOME | End: 2019-07-02
Attending: ANESTHESIOLOGY | Admitting: ANESTHESIOLOGY
Payer: COMMERCIAL

## 2019-07-02 VITALS
HEART RATE: 85 BPM | OXYGEN SATURATION: 98 % | RESPIRATION RATE: 18 BRPM | DIASTOLIC BLOOD PRESSURE: 93 MMHG | TEMPERATURE: 97.9 F | SYSTOLIC BLOOD PRESSURE: 129 MMHG

## 2019-07-02 DIAGNOSIS — M48.062 LUMBAR STENOSIS WITH NEUROGENIC CLAUDICATION: ICD-10-CM

## 2019-07-02 PROCEDURE — 64483 NJX AA&/STRD TFRM EPI L/S 1: CPT | Performed by: ANESTHESIOLOGY

## 2019-07-02 RX ORDER — BUPIVACAINE HCL/PF 2.5 MG/ML
10 VIAL (ML) INJECTION ONCE
Status: COMPLETED | OUTPATIENT
Start: 2019-07-02 | End: 2019-07-02

## 2019-07-02 RX ORDER — METHYLPREDNISOLONE ACETATE 80 MG/ML
80 INJECTION, SUSPENSION INTRA-ARTICULAR; INTRALESIONAL; INTRAMUSCULAR; PARENTERAL; SOFT TISSUE ONCE
Status: COMPLETED | OUTPATIENT
Start: 2019-07-02 | End: 2019-07-02

## 2019-07-02 RX ORDER — 0.9 % SODIUM CHLORIDE 0.9 %
10 VIAL (ML) INJECTION ONCE
Status: COMPLETED | OUTPATIENT
Start: 2019-07-02 | End: 2019-07-02

## 2019-07-02 RX ADMIN — Medication 5 ML: at 15:01

## 2019-07-02 RX ADMIN — METHYLPREDNISOLONE ACETATE 80 MG: 80 INJECTION, SUSPENSION INTRA-ARTICULAR; INTRALESIONAL; INTRAMUSCULAR; PARENTERAL; SOFT TISSUE at 15:03

## 2019-07-02 RX ADMIN — BUPIVACAINE HYDROCHLORIDE 2 ML: 2.5 INJECTION, SOLUTION EPIDURAL; INFILTRATION; INTRACAUDAL at 15:03

## 2019-07-02 RX ADMIN — SODIUM CHLORIDE 5 ML: 9 INJECTION, SOLUTION INTRAMUSCULAR; INTRAVENOUS; SUBCUTANEOUS at 15:01

## 2019-07-02 RX ADMIN — IOHEXOL 1 ML: 300 INJECTION, SOLUTION INTRAVENOUS at 15:03

## 2019-07-02 NOTE — H&P
History of Present Illness: The patient is a 52 y o  female who presents with complaints of lower back and leg pain secondary spinal stenosis and is here today for bilateral L4 transforaminal epidural steroid injection      Patient Active Problem List   Diagnosis    Lumbar stenosis with neurogenic claudication       Past Medical History:   Diagnosis Date    Hypertension     Rheumatoid arthritis ([address] )        Past Surgical History:   Procedure Laterality Date     SECTION           Current Outpatient Medications:     Acetaminophen (TYLENOL ARTHRITIS PAIN PO), Take by mouth, Disp: , Rfl:     amLODIPine-benazepril (LOTREL) 10-40 MG per capsule, , Disp: , Rfl:     atorvastatin (LIPITOR) 20 mg tablet, , Disp: , Rfl:     gabapentin (NEURONTIN) 300 mg capsule, Take 1 tablet 2 times daily, Disp: 60 capsule, Rfl: 0    meloxicam (MOBIC) 15 mg tablet, , Disp: , Rfl:     Multiple Vitamins-Minerals (CENTRUM SILVER PO), Take by mouth, Disp: , Rfl:     pantoprazole (PROTONIX) 40 mg tablet, , Disp: , Rfl:     Current Facility-Administered Medications:     bupivacaine (PF) (MARCAINE) 0 25 % injection 10 mL, 10 mL, Epidural, Once, Norberto Tolliver MD    iohexol (OMNIPAQUE) 300 mg/mL injection 50 mL, 50 mL, Epidural, Once, Norberto Tolliver MD    lidocaine (PF) (XYLOCAINE-MPF) 2 % injection 5 mL, 5 mL, Infiltration, Once, Norberto Tolliver MD    methylPREDNISolone acetate (DEPO-MEDROL) injection 80 mg, 80 mg, Epidural, Once, Norberto Tolliver MD    sodium chloride (PF) 0 9 % injection 10 mL, 10 mL, Infiltration, Once, Norberto Tolliver MD    Allergies   Allergen Reactions    Sulfa Antibiotics        Physical Exam:   Vitals:    19 1451   BP: 126/78   Pulse: 89   Resp: 18   Temp: 97 9 °F (36 6 °C)   SpO2: 96%     General: Awake, Alert, Oriented x 3, Mood and affect appropriate  Respiratory: Respirations even and unlabored  Cardiovascular: Peripheral pulses intact; no edema  Musculoskeletal Exam:   Lower back tenderness    ASA Score: 2    Patient/Chart Verification  Patient ID Verified: Verbal  ID Band Applied: No  Consents Confirmed: Procedural, To be obtained in the Pre-Procedure area  H&P( within 30 days) Verified: To be obtained in the Pre-Procedure area  Interval H&P(within 24 hr) Complete (required for Outpatients and Surgery Admit only): To be obtained in the Pre-Procedure area  Allergies Reviewed: Yes  Anticoag/NSAID held?: No  Currently on antibiotics?: No    Assessment:   1   Lumbar stenosis with neurogenic claudication        Plan: Bilateral L4 TFESI

## 2019-07-02 NOTE — DISCHARGE INSTR - LAB
Epidural Steroid Injection   WHAT YOU NEED TO KNOW:   An epidural steroid injection (JAMEL) is a procedure to inject steroid medicine into the epidural space  The epidural space is between your spinal cord and vertebrae  Steroids reduce inflammation and fluid buildup in your spine that may be causing pain  You may be given pain medicine along with the steroids  ACTIVITY  · Do not drive or operate machinery today  · No strenuous activity today - bending, lifting, etc   · You may resume normal activites starting tomorrow - start slowly and as tolerated  · You may shower today, but no tub baths or hot tubs  · You may have numbness for several hours from the local anesthetic  Please use caution and common sense, especially with weight-bearing activities  CARE OF THE INJECTION SITE  · If you have soreness or pain, apply ice to the area today (20 minutes on/20 minutes off)  · Starting tomorrow, you may use warm, moist heat or ice if needed  · You may have an increase or change in your discomfort for 36-48 hours after your treatment  · Apply ice and continue with any pain medication you have been prescribed  · Notify [facility] if you have any of the following: redness, drainage, swelling, headache, stiff neck or fever above 100°F     SPECIAL INSTRUCTIONS  · Our office will contact you in approximately 7 days for a progress report  MEDICATIONS  · Continue to take all routine medications  · Our office may have instructed you to hold some medications  If you have a problem specifically related to your procedure, please call our office at [phone]  Problems not related to your procedure should be directed to your primary care physician

## 2019-07-09 ENCOUNTER — TELEPHONE (OUTPATIENT)
Dept: PAIN MEDICINE | Facility: CLINIC | Age: 54
End: 2019-07-09

## 2019-08-09 ENCOUNTER — APPOINTMENT (OUTPATIENT)
Dept: LAB | Facility: MEDICAL CENTER | Age: 54
End: 2019-08-09
Payer: COMMERCIAL

## 2019-08-09 ENCOUNTER — TRANSCRIBE ORDERS (OUTPATIENT)
Dept: ADMINISTRATIVE | Facility: HOSPITAL | Age: 54
End: 2019-08-09

## 2019-08-09 DIAGNOSIS — E78.5 HYPERLIPIDEMIA, UNSPECIFIED HYPERLIPIDEMIA TYPE: Primary | ICD-10-CM

## 2019-08-09 DIAGNOSIS — E78.5 HYPERLIPIDEMIA, UNSPECIFIED HYPERLIPIDEMIA TYPE: ICD-10-CM

## 2019-08-09 LAB
CHOLEST SERPL-MCNC: 156 MG/DL (ref 50–200)
HDLC SERPL-MCNC: 49 MG/DL (ref 40–60)
LDLC SERPL CALC-MCNC: 86 MG/DL (ref 0–100)
NONHDLC SERPL-MCNC: 107 MG/DL
TRIGL SERPL-MCNC: 104 MG/DL

## 2019-08-09 PROCEDURE — 36415 COLL VENOUS BLD VENIPUNCTURE: CPT

## 2019-08-09 PROCEDURE — 80061 LIPID PANEL: CPT

## 2019-08-14 DIAGNOSIS — M48.062 LUMBAR STENOSIS WITH NEUROGENIC CLAUDICATION: ICD-10-CM

## 2019-08-15 RX ORDER — GABAPENTIN 300 MG/1
CAPSULE ORAL
Qty: 60 CAPSULE | Refills: 0 | OUTPATIENT
Start: 2019-08-15

## 2019-09-13 DIAGNOSIS — M48.062 LUMBAR STENOSIS WITH NEUROGENIC CLAUDICATION: ICD-10-CM

## 2019-09-16 RX ORDER — GABAPENTIN 300 MG/1
CAPSULE ORAL
Qty: 30 CAPSULE | Refills: 5 | Status: SHIPPED | OUTPATIENT
Start: 2019-09-16 | End: 2020-02-06 | Stop reason: SDUPTHER

## 2019-09-16 NOTE — TELEPHONE ENCOUNTER
S/w patient, was told to call back  Per pt gabapentin is helping, only taking at bedtime       # [phone] (M)

## 2019-09-16 NOTE — TELEPHONE ENCOUNTER
Patient called stating she only has one pill left for tonight  She is asking that this medication be refilled for tomorrow

## 2019-10-23 ENCOUNTER — APPOINTMENT (OUTPATIENT)
Dept: LAB | Facility: MEDICAL CENTER | Age: 54
End: 2019-10-23
Payer: COMMERCIAL

## 2019-10-23 ENCOUNTER — TELEPHONE (OUTPATIENT)
Dept: OBGYN CLINIC | Facility: MEDICAL CENTER | Age: 54
End: 2019-10-23

## 2019-10-23 ENCOUNTER — OFFICE VISIT (OUTPATIENT)
Dept: OBGYN CLINIC | Facility: MEDICAL CENTER | Age: 54
End: 2019-10-23
Payer: COMMERCIAL

## 2019-10-23 VITALS — WEIGHT: 228.6 LBS | DIASTOLIC BLOOD PRESSURE: 70 MMHG | BODY MASS INDEX: 41.81 KG/M2 | SYSTOLIC BLOOD PRESSURE: 106 MMHG

## 2019-10-23 DIAGNOSIS — N95.0 POSTMENOPAUSAL BLEEDING: ICD-10-CM

## 2019-10-23 DIAGNOSIS — Z12.31 ENCOUNTER FOR SCREENING MAMMOGRAM FOR MALIGNANT NEOPLASM OF BREAST: Primary | ICD-10-CM

## 2019-10-23 DIAGNOSIS — Z12.4 ENCOUNTER FOR PAPANICOLAOU SMEAR OF CERVIX: ICD-10-CM

## 2019-10-23 PROBLEM — E78.5 HYPERLIPIDEMIA: Status: ACTIVE | Noted: 2017-11-29

## 2019-10-23 PROBLEM — R68.82 LOW LIBIDO: Status: ACTIVE | Noted: 2019-10-23

## 2019-10-23 PROBLEM — M15.9 GENERALIZED OSTEOARTHRITIS: Status: ACTIVE | Noted: 2019-08-22

## 2019-10-23 PROBLEM — Z01.419 GYNECOLOGIC EXAM NORMAL: Status: ACTIVE | Noted: 2019-10-23

## 2019-10-23 LAB
BASOPHILS # BLD AUTO: 0.12 THOUSANDS/ΜL (ref 0–0.1)
BASOPHILS NFR BLD AUTO: 2 % (ref 0–1)
EOSINOPHIL # BLD AUTO: 0.29 THOUSAND/ΜL (ref 0–0.61)
EOSINOPHIL NFR BLD AUTO: 4 % (ref 0–6)
ERYTHROCYTE [DISTWIDTH] IN BLOOD BY AUTOMATED COUNT: 13.2 % (ref 11.6–15.1)
FSH SERPL-ACNC: 50.8 MIU/ML
HCT VFR BLD AUTO: 42.6 % (ref 34.8–46.1)
HGB BLD-MCNC: 13.8 G/DL (ref 11.5–15.4)
IMM GRANULOCYTES # BLD AUTO: 0.01 THOUSAND/UL (ref 0–0.2)
IMM GRANULOCYTES NFR BLD AUTO: 0 % (ref 0–2)
LYMPHOCYTES # BLD AUTO: 1.94 THOUSANDS/ΜL (ref 0.6–4.47)
LYMPHOCYTES NFR BLD AUTO: 27 % (ref 14–44)
MCH RBC QN AUTO: 28.6 PG (ref 26.8–34.3)
MCHC RBC AUTO-ENTMCNC: 32.4 G/DL (ref 31.4–37.4)
MCV RBC AUTO: 88 FL (ref 82–98)
MONOCYTES # BLD AUTO: 0.72 THOUSAND/ΜL (ref 0.17–1.22)
MONOCYTES NFR BLD AUTO: 10 % (ref 4–12)
NEUTROPHILS # BLD AUTO: 4.2 THOUSANDS/ΜL (ref 1.85–7.62)
NEUTS SEG NFR BLD AUTO: 57 % (ref 43–75)
NRBC BLD AUTO-RTO: 0 /100 WBCS
PLATELET # BLD AUTO: 286 THOUSANDS/UL (ref 149–390)
PMV BLD AUTO: 11.1 FL (ref 8.9–12.7)
RBC # BLD AUTO: 4.83 MILLION/UL (ref 3.81–5.12)
TSH SERPL DL<=0.05 MIU/L-ACNC: 1.73 UIU/ML (ref 0.36–3.74)
WBC # BLD AUTO: 7.28 THOUSAND/UL (ref 4.31–10.16)

## 2019-10-23 PROCEDURE — 83001 ASSAY OF GONADOTROPIN (FSH): CPT

## 2019-10-23 PROCEDURE — 36415 COLL VENOUS BLD VENIPUNCTURE: CPT

## 2019-10-23 PROCEDURE — 84443 ASSAY THYROID STIM HORMONE: CPT

## 2019-10-23 PROCEDURE — 85025 COMPLETE CBC W/AUTO DIFF WBC: CPT

## 2019-10-23 PROCEDURE — S0612 ANNUAL GYNECOLOGICAL EXAMINA: HCPCS | Performed by: STUDENT IN AN ORGANIZED HEALTH CARE EDUCATION/TRAINING PROGRAM

## 2019-10-23 PROCEDURE — 87624 HPV HI-RISK TYP POOLED RSLT: CPT | Performed by: STUDENT IN AN ORGANIZED HEALTH CARE EDUCATION/TRAINING PROGRAM

## 2019-10-23 PROCEDURE — G0145 SCR C/V CYTO,THINLAYER,RESCR: HCPCS | Performed by: STUDENT IN AN ORGANIZED HEALTH CARE EDUCATION/TRAINING PROGRAM

## 2019-10-23 RX ORDER — SPIRONOLACTONE 25 MG/1
TABLET ORAL
COMMUNITY
Start: 2019-04-05

## 2019-10-23 NOTE — PROGRESS NOTES
Assessment/Plan:    Gynecologic exam normal  63 yo  here for annual exam with complaint of PMB    Pap and HPV collected today  Mammogram slip given  Advised colonoscopy      Postmenopausal bleeding  63 yo  here for annual with PMB    TSH, FSH, CBC  TVUS  Return in 2 wks for EMB    Low libido  5 years of symptoms  Discussed options for medication management or cognitive to start  Patient thinking about these options while working up PMB  Diagnoses and all orders for this visit:    Encounter for screening mammogram for malignant neoplasm of breast  -     Mammo screening bilateral w cad; Future    Postmenopausal bleeding  -     US pelvis complete w transvaginal; Future  -     Follicle stimulating hormone; Future  -     TSH, 3rd generation; Future  -     CBC and differential; Future    Other orders  -     spironolactone (ALDACTONE) 25 mg tablet          Subjective:      Patient ID: Aman Simpson is a 62 y o  female  Patient had her last period 3/18  She had an episode of bleeding 10/10  The episode was just like a period and lasted a week  She has had no further since  She otherwise is feeling well without complaint  She has not seen a gyn for several years  She is also reporting very low libido  She is no longer having sex because of this  She doesn't know that she has problems if she were to have sex because it has been so long  The following portions of the patient's history were reviewed and updated as appropriate: allergies, current medications, past family history, past medical history, past social history, past surgical history and problem list     Review of Systems   Constitutional: Negative for chills and fever  Respiratory: Negative for shortness of breath  Cardiovascular: Negative for chest pain  Gastrointestinal: Negative for constipation, diarrhea and nausea  Genitourinary: Negative for dysuria and frequency           Objective:      /70 (BP Location: Left arm, Patient Position: Sitting, Cuff Size: Large)   Wt 104 kg (228 lb 9 6 oz)   BMI 41 81 kg/m²          Physical Exam   Constitutional: She appears well-developed and well-nourished  HENT:   Head: Normocephalic and atraumatic  Eyes: EOM are normal    Neck: Normal range of motion  No thyromegaly present  Pulmonary/Chest: Effort normal  Right breast exhibits no inverted nipple, no mass, no nipple discharge, no skin change and no tenderness  Left breast exhibits no inverted nipple, no mass, no nipple discharge, no skin change and no tenderness  Breasts are symmetrical    Abdominal: Soft  She exhibits no distension and no mass  There is no tenderness  There is no rebound and no guarding  Genitourinary: Pelvic exam was performed with patient supine  There is no rash, tenderness, lesion or injury on the right labia  There is no rash, tenderness, lesion or injury on the left labia  Uterus is not enlarged and not tender  Cervix exhibits friability  Cervix exhibits no motion tenderness and no discharge  Right adnexum displays no mass, no tenderness and no fullness  Left adnexum displays no mass, no tenderness and no fullness  No erythema, tenderness or bleeding in the vagina  No signs of injury around the vagina  No vaginal discharge found  Genitourinary Comments: Vagina with atrophy consistent with postmenopausal status  Urethral meatus normal without lesion, discharge  No grossly appreciated pelvic organ prolapse  NEED LONG SPECULUM  Cervix friable with pap

## 2019-10-23 NOTE — ASSESSMENT & PLAN NOTE
61 yo  here for annual exam with complaint of PMB    Pap and HPV collected today    Mammogram slip given  Advised colonoscopy

## 2019-10-23 NOTE — ASSESSMENT & PLAN NOTE
5 years of symptoms  Discussed options for medication management or cognitive to start  Patient thinking about these options while working up PMB

## 2019-10-23 NOTE — TELEPHONE ENCOUNTER
Reviewed normal TSH and CBC  Discussed [address] reflects menopause which is not surprising  Continue plan for US followed by EMB to meryl PMB

## 2019-10-25 LAB
HPV HR 12 DNA CVX QL NAA+PROBE: NEGATIVE
HPV16 DNA CVX QL NAA+PROBE: NEGATIVE
HPV18 DNA CVX QL NAA+PROBE: NEGATIVE

## 2019-10-29 ENCOUNTER — HOSPITAL ENCOUNTER (OUTPATIENT)
Dept: RADIOLOGY | Facility: MEDICAL CENTER | Age: 54
Discharge: HOME | End: 2019-10-29
Payer: COMMERCIAL

## 2019-10-29 DIAGNOSIS — N95.0 POSTMENOPAUSAL BLEEDING: ICD-10-CM

## 2019-10-29 LAB
LAB AP GYN PRIMARY INTERPRETATION: NORMAL
Lab: NORMAL

## 2019-10-29 PROCEDURE — 76830 TRANSVAGINAL US NON-OB: CPT

## 2019-10-29 PROCEDURE — 76856 US EXAM PELVIC COMPLETE: CPT

## 2019-10-29 NOTE — TELEPHONE ENCOUNTER
Called to review negative pap and HPV  No answer  Left VM for her to call  Okay to share with patient

## 2019-11-04 ENCOUNTER — TELEPHONE (OUTPATIENT)
Dept: OBGYN CLINIC | Facility: MEDICAL CENTER | Age: 54
End: 2019-11-04

## 2019-11-04 NOTE — TELEPHONE ENCOUNTER
Called Estrellita Grady to review US results  Spoke with her regarding the results  US demonstrated two small fibroids in the uterus and a marble sized fluid filled cyst in the left ovary  EMS 4 mm  I recommend we still plan for bx given episode of PMB in October  Reviewed recommendation to follow ovarian cyst with US yearly given PM status  No questions  Coming Friday for EMB  Reviewed tylenol and ibuprofen 400 mg may be helpful premedications

## 2019-11-08 ENCOUNTER — OFFICE VISIT (OUTPATIENT)
Dept: OBGYN CLINIC | Facility: MEDICAL CENTER | Age: 54
End: 2019-11-08
Payer: COMMERCIAL

## 2019-11-08 VITALS — WEIGHT: 227 LBS | BODY MASS INDEX: 41.52 KG/M2 | DIASTOLIC BLOOD PRESSURE: 74 MMHG | SYSTOLIC BLOOD PRESSURE: 120 MMHG

## 2019-11-08 DIAGNOSIS — N95.0 POSTMENOPAUSAL BLEEDING: Primary | ICD-10-CM

## 2019-11-08 PROCEDURE — 58100 BIOPSY OF UTERUS LINING: CPT | Performed by: STUDENT IN AN ORGANIZED HEALTH CARE EDUCATION/TRAINING PROGRAM

## 2019-11-08 PROCEDURE — 88305 TISSUE EXAM BY PATHOLOGIST: CPT | Performed by: PATHOLOGY

## 2019-11-08 PROCEDURE — 99213 OFFICE O/P EST LOW 20 MIN: CPT | Performed by: STUDENT IN AN ORGANIZED HEALTH CARE EDUCATION/TRAINING PROGRAM

## 2019-11-08 RX ORDER — ASPIRIN 81 MG/1
81 TABLET, CHEWABLE ORAL DAILY
COMMUNITY

## 2019-11-08 NOTE — PROGRESS NOTES
Endometrial biopsy  Date/Time: 11/8/2019 9:57 AM  Performed by: Regina Dorantes MD  Authorized by: Regina Dorantes MD     Consent:     Consent obtained:  Verbal    Consent given by:  Patient    Procedural risks discussed:  Bleeding, infection, repeat procedure and damage to other organs    Patient questions answered: yes      Patient agrees, verbalizes understanding, and wants to proceed: yes    Indication:     Indications: Post-menopausal bleeding      Chronicity of post-menopausal bleeding:  New    Progression of post-menopausal bleeding:  Resolved  Procedure:     Procedure: endometrial biopsy with Pipelle      A bivalve speculum was placed in the vagina: yes      Cervix cleaned and prepped: yes      A paracervical block was performed: no      The cervix was dilated: yes      Uterus sounded: yes      Uterus sound depth (cm):  6    Specimen collected: specimen collected and sent to pathology      Patient tolerated procedure well with no complications: yes    Findings:     Uterus size:  Non-gravid    Cervix: stenotic    Comments:      Cervical os small and stenotic  Easily opened with os finder  Pipelle passed easily

## 2019-11-18 ENCOUNTER — TELEPHONE (OUTPATIENT)
Dept: OBGYN CLINIC | Facility: MEDICAL CENTER | Age: 54
End: 2019-11-18

## 2019-11-18 NOTE — TELEPHONE ENCOUNTER
Called patient to review benign biopsy  No answer, left VM for patient to call back  Okay to review normal biopsy  Patient should call with further bleeding episodes  If she would like to return to discuss low libido she can make that appt at any time otherwise I recommend annual in one year  Please let me know if patient would like call back from myself  Thanks! A   Endometrium, biopsy:  -  Superficial portions of benign inactive endometrium, negative for hyperplasia or carcinoma

## 2019-12-02 ENCOUNTER — TRANSCRIBE ORDERS (OUTPATIENT)
Dept: ADMINISTRATIVE | Facility: HOSPITAL | Age: 54
End: 2019-12-02

## 2019-12-02 ENCOUNTER — APPOINTMENT (OUTPATIENT)
Dept: LAB | Facility: MEDICAL CENTER | Age: 54
End: 2019-12-02
Payer: COMMERCIAL

## 2019-12-02 ENCOUNTER — HOSPITAL ENCOUNTER (OUTPATIENT)
Dept: RADIOLOGY | Facility: MEDICAL CENTER | Age: 54
Discharge: HOME | End: 2019-12-02
Payer: COMMERCIAL

## 2019-12-02 ENCOUNTER — TELEPHONE (OUTPATIENT)
Dept: OBGYN CLINIC | Facility: MEDICAL CENTER | Age: 54
End: 2019-12-02

## 2019-12-02 VITALS — WEIGHT: 227 LBS | HEIGHT: 62 IN | BODY MASS INDEX: 41.77 KG/M2

## 2019-12-02 DIAGNOSIS — Z12.31 ENCOUNTER FOR SCREENING MAMMOGRAM FOR MALIGNANT NEOPLASM OF BREAST: ICD-10-CM

## 2019-12-02 DIAGNOSIS — E78.5 HYPERLIPIDEMIA, UNSPECIFIED HYPERLIPIDEMIA TYPE: Primary | ICD-10-CM

## 2019-12-02 LAB
CHOLEST SERPL-MCNC: 149 MG/DL (ref 50–200)
HDLC SERPL-MCNC: 43 MG/DL
LDLC SERPL CALC-MCNC: 86 MG/DL (ref 0–100)
NONHDLC SERPL-MCNC: 106 MG/DL
TRIGL SERPL-MCNC: 101 MG/DL

## 2019-12-02 PROCEDURE — 77067 SCR MAMMO BI INCL CAD: CPT

## 2019-12-02 PROCEDURE — 80061 LIPID PANEL: CPT | Performed by: FAMILY MEDICINE

## 2019-12-02 PROCEDURE — 36415 COLL VENOUS BLD VENIPUNCTURE: CPT | Performed by: FAMILY MEDICINE

## 2020-01-10 ENCOUNTER — TELEPHONE (OUTPATIENT)
Dept: OBGYN CLINIC | Facility: HOSPITAL | Age: 55
End: 2020-01-10

## 2020-01-10 NOTE — TELEPHONE ENCOUNTER
Patient called to schedule spine injections with Dr Montez Ellison    I transferred the call to [facility]

## 2020-01-15 NOTE — TELEPHONE ENCOUNTER
Patient called to schedule another injection  She was last here to see Ayaka Sondra in June of 19 and had an injection in July  She has BC and should probably have an OV scheduled  You or Sheree Healy?  Thank you

## 2020-02-06 ENCOUNTER — OFFICE VISIT (OUTPATIENT)
Dept: PAIN MEDICINE | Facility: CLINIC | Age: 55
End: 2020-02-06
Payer: COMMERCIAL

## 2020-02-06 VITALS
BODY MASS INDEX: 40.6 KG/M2 | DIASTOLIC BLOOD PRESSURE: 84 MMHG | TEMPERATURE: 97.9 F | SYSTOLIC BLOOD PRESSURE: 128 MMHG | WEIGHT: 222 LBS | HEART RATE: 83 BPM

## 2020-02-06 DIAGNOSIS — M54.40 CHRONIC BILATERAL LOW BACK PAIN WITH SCIATICA, SCIATICA LATERALITY UNSPECIFIED: ICD-10-CM

## 2020-02-06 DIAGNOSIS — G89.29 CHRONIC BILATERAL LOW BACK PAIN WITH SCIATICA, SCIATICA LATERALITY UNSPECIFIED: ICD-10-CM

## 2020-02-06 DIAGNOSIS — M47.816 LUMBAR SPONDYLOSIS: ICD-10-CM

## 2020-02-06 DIAGNOSIS — G89.4 CHRONIC PAIN SYNDROME: Primary | ICD-10-CM

## 2020-02-06 DIAGNOSIS — G56.01 CARPAL TUNNEL SYNDROME OF RIGHT WRIST: ICD-10-CM

## 2020-02-06 DIAGNOSIS — M48.062 SPINAL STENOSIS OF LUMBAR REGION WITH NEUROGENIC CLAUDICATION: ICD-10-CM

## 2020-02-06 DIAGNOSIS — M51.16 INTERVERTEBRAL DISC DISORDER WITH RADICULOPATHY OF LUMBAR REGION: ICD-10-CM

## 2020-02-06 DIAGNOSIS — M48.062 LUMBAR STENOSIS WITH NEUROGENIC CLAUDICATION: ICD-10-CM

## 2020-02-06 DIAGNOSIS — M54.16 LUMBAR RADICULOPATHY: ICD-10-CM

## 2020-02-06 PROCEDURE — 99214 OFFICE O/P EST MOD 30 MIN: CPT | Performed by: NURSE PRACTITIONER

## 2020-02-06 RX ORDER — GABAPENTIN 300 MG/1
300 CAPSULE ORAL
Qty: 30 CAPSULE | Refills: 5 | Status: SHIPPED | OUTPATIENT
Start: 2020-02-06 | End: 2020-09-02 | Stop reason: SDUPTHER

## 2020-02-06 NOTE — PROGRESS NOTES
Assessment:  1  Chronic pain syndrome    2  Chronic bilateral low back pain with sciatica, sciatica laterality unspecified    3  Lumbar spondylosis    4  Spinal stenosis of lumbar region with neurogenic claudication    5  Lumbar radiculopathy    6  Intervertebral disc disorder with radiculopathy of lumbar region    7  Lumbar stenosis with neurogenic claudication    8  Carpal tunnel syndrome of right wrist        Plan:  Delia Freedman is a 62 y o  female who presents for a follow up office visit in regards to Hip Pain (evaluation for possible injection) and Leg Pain  The patient has a history of chronic pain syndrome secondary to low back pain, lumbar intervertebral disc disorder with radiculopathy, lumbar spondylosis, lumbar stenosis  Patient presents today with ongoing low back and bilateral leg pain, which has increase over the past month  Her pain is consistent with arthritis along with a disc protrusion at L4-L5 which is causing her radicular symptoms  Therefore we will repeat the bilateral L4 transforaminal epidural steroid injection to decrease inflammation and nerve irritation  Complete risks and benefits including bleeding, infection, tissue reaction, nerve injury and allergic reaction were discussed  The approach was demonstrated using models and literature was provided  Verbal and written consent was obtained  She will continue on gabapentin as prescribed  In regards to the numbness in her 1st, 2nd, and 3rd finger on the right hand, I will order a wrist splint  The hand pain is most likely being caused by carpal tunnel syndrome, and splints will help to keep the wrists aligned and take the pressure off the median nerve  If the pain does not improve with the splint, will order an EMG of the right upper extremity  My impressions and treatment recommendations were discussed in detail with the patient who verbalized understanding and had no further questions    Discharge instructions were provided  I personally saw and examined the patient and I agree with the above discussed plan of care  Orders Placed This Encounter   Procedures    Cock Up Wrist Splint     Order Specific Question:   Location     Answer:   Right     Order Specific Question:   Size     Answer:   [place]    [place] spine and pain procedure     Standing Status:   Future     Standing Expiration Date:   2/6/2024     Order Specific Question:   Reason for Exam:     Answer:   bilateral L4 TFESI     Order Specific Question:   Is the patient pregnant? Answer:   No     Order Specific Question:   Anticoagulant hold needed? Answer:   no     New Medications Ordered This Visit   Medications    gabapentin (NEURONTIN) 300 mg capsule     Sig: Take 1 capsule (300 mg total) by mouth daily at bedtime     Dispense:  30 capsule     Refill:  5       History of Present Illness:  James Pena is a 62 y o  female who presents for a follow up office visit in regards to Hip Pain (evaluation for possible injection) and Leg Pain  The patient has a history of chronic pain syndrome secondary to low back pain, lumbar intervertebral disc disorder with radiculopathy, lumbar spondylosis, lumbar stenosis  She presents today with ongoing pain that radiates across the low back and down both legs  The pain was almost 100% gone after undergoing a bilateral L4 transforaminal epidural steroid injection which she had on July 12, 2019  She states the pain started to return about 1 month ago  She denies trauma or precipitating event which causes pain to occur  The pain is now constant occurs mostly in the evening at night  She describes as sharp, throbbing, cramping, pressure-like, shooting, and pins and needles  She is rating her pain a 10/10 on numeric rating scale  She also has complaints of numbness in the 1st, 2nd, and 3rd finger on the right hand  She states it occurs intermittently throughout the day  She also notices she is dropping things      She is currently taking gabapentin 300 mg at bedtime which does provide some pain relief  She is not able to increase the dose as she had tried in the past and cause drowsiness  I have personally reviewed and/or updated the patient's past medical history, past surgical history, family history, social history, current medications, allergies, and vital signs today  Review of Systems   Respiratory: Negative for shortness of breath  Cardiovascular: Negative for chest pain  Gastrointestinal: Negative for constipation, diarrhea, nausea and vomiting  Musculoskeletal: Positive for gait problem and joint swelling  Negative for arthralgias and myalgias  Skin: Negative for rash  Neurological: Positive for weakness  Negative for dizziness and seizures  All other systems reviewed and are negative        Patient Active Problem List   Diagnosis    Lumbar stenosis with neurogenic claudication    Essential hypertension    Generalized osteoarthritis    Hyperlipidemia    Lump, breast    Postmenopausal bleeding    Gynecologic exam normal    Low libido       Past Medical History:   Diagnosis Date    Hypertension     Rheumatoid arthritis ([facility] [address] )        Past Surgical History:   Procedure Laterality Date     SECTION         Family History   Problem Relation Age of Onset    Heart disease Mother     Hypertension Mother     Diabetes Mother     No Known Problems Father     No Known Problems Maternal Grandmother     No Known Problems Maternal Grandfather     No Known Problems Paternal Grandmother     Prostate cancer Paternal Grandfather 79    Ovarian cancer Paternal Aunt     Endometrial cancer Paternal Aunt 39    Breast cancer Maternal Aunt 79    Breast cancer Family     No Known Problems Brother     No Known Problems Brother     No Known Problems Brother     No Known Problems Son        Social History     Occupational History    Not on file   Tobacco Use    Smoking status: Former Smoker    Smokeless tobacco: Never Used   Substance and Sexual Activity    Alcohol use: Yes     Comment: Occasionally    Drug use: No    Sexual activity: Not Currently       Current Outpatient Medications on File Prior to Visit   Medication Sig    Acetaminophen (TYLENOL ARTHRITIS PAIN PO) Take by mouth    amLODIPine-benazepril (LOTREL) 10-40 MG per capsule     aspirin 81 mg chewable tablet Chew 81 mg daily    atorvastatin (LIPITOR) 20 mg tablet     meloxicam (MOBIC) 15 mg tablet     Multiple Vitamins-Minerals (CENTRUM SILVER PO) Take by mouth    pantoprazole (PROTONIX) 40 mg tablet     spironolactone (ALDACTONE) 25 mg tablet     [DISCONTINUED] gabapentin (NEURONTIN) 300 mg capsule Take 1 tablet 2 times daily (Patient taking differently: 300 mg Take 1 tablet at lunch, one at HS)    [DISCONTINUED] gabapentin (NEURONTIN) 300 mg capsule TAKE ONE CAPSULE BY MOUTH AT BEDTIME      No current facility-administered medications on file prior to visit  Allergies   Allergen Reactions    Sulfa Antibiotics        Physical Exam:    /84   Pulse 83   Temp 97 9 °F (36 6 °C) (Oral)   Wt 101 kg (222 lb)   BMI 40 60 kg/m²     Constitutional:normal, well developed, well nourished, alert, in no distress and non-toxic and no overt pain behavior    Eyes:anicteric  HEENT:grossly intact  Neck:supple, symmetric, trachea midline and no masses   Pulmonary:even and unlabored  Cardiovascular:No edema or pitting edema present  Skin:Normal without rashes or lesions and well hydrated  Psychiatric:Mood and affect appropriate  Neurologic:Cranial Nerves II-XII grossly intact  Musculoskeletal:normal     Lumbar Spine Exam    Appearance:  Normal lordosis  Palpation/Tenderness:  left lumbar paraspinal tenderness  right lumbar paraspinal tenderness  Range of Motion:  Flexion:  Minimally limited  with pain  Extension:  No limitation  with pain  Motor Strength:  Left hip flexion:  5/5  Right hip flexion:  5/5  Left knee flexion:  5/5  Left knee extension:  5/5  Right knee flexion:  5/5  Right knee extension:  5/5  Left foot dorsiflexion:  5/5  Left foot plantar flexion:  5/5  Right foot dorsiflexion:  5/5  Right foot plantar flexion:  5/5    Imaging  MRI LUMBAR SPINE WITHOUT CONTRAST     INDICATION: Chronic bilateral leg pain        COMPARISON:  None      TECHNIQUE:  Sagittal T1, sagittal T2, sagittal inversion recovery, axial T1 and axial T2, coronal T2        IMAGE QUALITY:  Diagnostic     FINDINGS:     ALIGNMENT:  Slight anterolisthesis L4 on 5 measures approximately 5 mm      MARROW SIGNAL:  Hemangioma L1      DISTAL CORD AND CONUS:  Normal size and signal within the distal cord and conus  The conus ends at the L1-L2 level      PARASPINAL SOFT TISSUES:  Paraspinal soft tissues are unremarkable      SACRUM:  Normal signal within the sacrum  No evidence of insufficiency or stress fracture      LOWER THORACIC DISC SPACES:  Normal disc height and signal   No disc herniation, canal stenosis or foraminal narrowing      LUMBAR DISC SPACES:     L1-L2:  Mild bulge  No significant central or foraminal narrowing      L2-L3:  No significant central or foraminal narrowing with small right-sided marginal osteophyte      L3-L4:  Mild facet hypertrophy  Mild annular bulging with small marginal osteophyte on the left  There is mild left foraminal narrowing  Disc material approaches but does not displace the extraforaminal left L3 nerve root      L4-L5:  Severe facet degenerative change accounts for slight anterolisthesis  Moderate to severe central stenosis with severe right lateral recess stenosis  Correlate for right L5 radiculopathy      L5-S1:  Severe facet degenerative change  No significant central or foraminal narrowing      IMPRESSION:     Severe facet degenerative change L4-5 and L5-S1 accounts for 5 mm anterolisthesis L4-5      Moderate to severe central stenosis and severe right lateral recess stenosis L4-5    Correlate for right L5 radiculopathy      Degenerative changes L3-4 noted with disc material approaching but not displacing the extraforaminal left L3 nerve root

## 2020-02-20 ENCOUNTER — HOSPITAL ENCOUNTER (OUTPATIENT)
Dept: RADIOLOGY | Facility: CLINIC | Age: 55
Discharge: HOME | End: 2020-02-20
Attending: ANESTHESIOLOGY
Payer: COMMERCIAL

## 2020-02-20 VITALS
DIASTOLIC BLOOD PRESSURE: 71 MMHG | SYSTOLIC BLOOD PRESSURE: 103 MMHG | RESPIRATION RATE: 20 BRPM | HEART RATE: 81 BPM | TEMPERATURE: 97.9 F | OXYGEN SATURATION: 95 %

## 2020-02-20 DIAGNOSIS — M51.16 INTERVERTEBRAL DISC DISORDER WITH RADICULOPATHY OF LUMBAR REGION: ICD-10-CM

## 2020-02-20 PROCEDURE — 64483 NJX AA&/STRD TFRM EPI L/S 1: CPT | Performed by: ANESTHESIOLOGY

## 2020-02-20 RX ORDER — METHYLPREDNISOLONE ACETATE 80 MG/ML
80 INJECTION, SUSPENSION INTRA-ARTICULAR; INTRALESIONAL; INTRAMUSCULAR; PARENTERAL; SOFT TISSUE ONCE
Status: COMPLETED | OUTPATIENT
Start: 2020-02-20 | End: 2020-02-20

## 2020-02-20 RX ORDER — BUPIVACAINE HCL/PF 2.5 MG/ML
10 VIAL (ML) INJECTION ONCE
Status: COMPLETED | OUTPATIENT
Start: 2020-02-20 | End: 2020-02-20

## 2020-02-20 RX ORDER — 0.9 % SODIUM CHLORIDE 0.9 %
10 VIAL (ML) INJECTION ONCE
Status: COMPLETED | OUTPATIENT
Start: 2020-02-20 | End: 2020-02-20

## 2020-02-20 RX ADMIN — IOHEXOL 1 ML: 300 INJECTION, SOLUTION INTRAVENOUS at 09:49

## 2020-02-20 RX ADMIN — METHYLPREDNISOLONE ACETATE 80 MG: 80 INJECTION, SUSPENSION INTRA-ARTICULAR; INTRALESIONAL; INTRAMUSCULAR; PARENTERAL; SOFT TISSUE at 09:49

## 2020-02-20 RX ADMIN — Medication 5 ML: at 09:47

## 2020-02-20 RX ADMIN — SODIUM CHLORIDE 5 ML: 9 INJECTION, SOLUTION INTRAMUSCULAR; INTRAVENOUS; SUBCUTANEOUS at 09:47

## 2020-02-20 RX ADMIN — BUPIVACAINE HYDROCHLORIDE 2 ML: 2.5 INJECTION, SOLUTION EPIDURAL; INFILTRATION; INTRACAUDAL at 09:49

## 2020-02-20 NOTE — H&P
History of Present Illness:  The patient is a 62 y o  female who presents with complaints of lower back and leg pain secondary to lumbar degenerative disc disease and stenosis and is here today for bilateral L4 transforaminal epidural steroid injection    Patient Active Problem List   Diagnosis    Lumbar stenosis with neurogenic claudication    Essential hypertension    Generalized osteoarthritis    Hyperlipidemia    Lump, breast    Postmenopausal bleeding    Gynecologic exam normal    Low libido       Past Medical History:   Diagnosis Date    Hypertension     Rheumatoid arthritis ([address] )        Past Surgical History:   Procedure Laterality Date     SECTION           Current Outpatient Medications:     Acetaminophen (TYLENOL ARTHRITIS PAIN PO), Take by mouth, Disp: , Rfl:     amLODIPine-benazepril (LOTREL) 10-40 MG per capsule, , Disp: , Rfl:     aspirin 81 mg chewable tablet, Chew 81 mg daily, Disp: , Rfl:     atorvastatin (LIPITOR) 20 mg tablet, , Disp: , Rfl:     gabapentin (NEURONTIN) 300 mg capsule, Take 1 capsule (300 mg total) by mouth daily at bedtime, Disp: 30 capsule, Rfl: 5    meloxicam (MOBIC) 15 mg tablet, , Disp: , Rfl:     Multiple Vitamins-Minerals (CENTRUM SILVER PO), Take by mouth, Disp: , Rfl:     pantoprazole (PROTONIX) 40 mg tablet, , Disp: , Rfl:     spironolactone (ALDACTONE) 25 mg tablet, , Disp: , Rfl:     Current Facility-Administered Medications:     bupivacaine (PF) (MARCAINE) 0 25 % injection 10 mL, 10 mL, Epidural, Once, Adrián Butterfield MD    iohexol (OMNIPAQUE) 300 mg/mL injection 50 mL, 50 mL, Epidural, Once, Adrián Butterfield MD    lidocaine (PF) (XYLOCAINE-MPF) 2 % injection 5 mL, 5 mL, Infiltration, Once, Adrián Butterfield MD    methylPREDNISolone acetate (DEPO-MEDROL) injection 80 mg, 80 mg, Epidural, Once, Adrián Butterfield MD    sodium chloride (PF) 0 9 % injection 10 mL, 10 mL, Infiltration, Once, Adirán Butterfield MD    Allergies   Allergen Reactions   Ale Urena Sulfa Antibiotics        Physical Exam:   Vitals:    02/20/20 0932   BP: 103/71   Pulse: 86   Resp: 18   Temp: 97 9 °F (36 6 °C)   SpO2: 94%     General: Awake, Alert, Oriented x 3, Mood and affect appropriate  Respiratory: Respirations even and unlabored  Cardiovascular: Peripheral pulses intact; no edema  Musculoskeletal Exam:   Lower back tenderness    ASA Score: 2    Patient/Chart Verification  Patient ID Verified: Verbal  ID Band Applied: No  Consents Confirmed: Procedural, To be obtained in the Pre-Procedure area  H&P( within 30 days) Verified: To be obtained in the Pre-Procedure area  Allergies Reviewed: Yes  Anticoag/NSAID held?: No(ASA 81)  Currently on antibiotics?: No    Assessment:   1   Intervertebral disc disorder with radiculopathy of lumbar region        Plan: bilateral L4 TFESI

## 2020-02-20 NOTE — DISCHARGE INSTR - LAB
Epidural Steroid Injection   WHAT YOU NEED TO KNOW:   An epidural steroid injection (JAMEL) is a procedure to inject steroid medicine into the epidural space  The epidural space is between your spinal cord and vertebrae  Steroids reduce inflammation and fluid buildup in your spine that may be causing pain  You may be given pain medicine along with the steroids  ACTIVITY  · Do not drive or operate machinery today  · No strenuous activity today - bending, lifting, etc   · You may resume normal activites starting tomorrow - start slowly and as tolerated  · You may shower today, but no tub baths or hot tubs  · You may have numbness for several hours from the local anesthetic  Please use caution and common sense, especially with weight-bearing activities  CARE OF THE INJECTION SITE  · If you have soreness or pain, apply ice to the area today (20 minutes on/20 minutes off)  · Starting tomorrow, you may use warm, moist heat or ice if needed  · You may have an increase or change in your discomfort for 36-48 hours after your treatment  · Apply ice and continue with any pain medication you have been prescribed  · Notify [facility] if you have any of the following: redness, drainage, swelling, headache, stiff neck or fever above 100°F     SPECIAL INSTRUCTIONS  · Our office will contact you in approximately 7 days for a progress report  MEDICATIONS  · Continue to take all routine medications  · Our office may have instructed you to hold some medications  If you have a problem specifically related to your procedure, please call our office at [phone]  Problems not related to your procedure should be directed to your primary care physician

## 2020-02-27 ENCOUNTER — TELEPHONE (OUTPATIENT)
Dept: PAIN MEDICINE | Facility: CLINIC | Age: 55
End: 2020-02-27

## 2020-03-02 NOTE — TELEPHONE ENCOUNTER
Pt reports 50% improvement post inj   Right leg she said is giving her more of a problem at night   Pain level 6/10

## 2020-03-10 ENCOUNTER — TELEPHONE (OUTPATIENT)
Dept: RHEUMATOLOGY | Facility: CLINIC | Age: 55
End: 2020-03-10

## 2020-03-10 NOTE — TELEPHONE ENCOUNTER
Called patient to schedule an 8 wk f/u with CRNP Cheryle Mesi per in basket message from Dr James Mancera  If patient calls back please schedule her with Narciso epperson around 5/11/20

## 2020-05-05 ENCOUNTER — TELEPHONE (OUTPATIENT)
Dept: RHEUMATOLOGY | Facility: CLINIC | Age: 55
End: 2020-05-05

## 2020-05-11 ENCOUNTER — TELEPHONE (OUTPATIENT)
Dept: RHEUMATOLOGY | Facility: CLINIC | Age: 55
End: 2020-05-11

## 2020-05-14 ENCOUNTER — TELEPHONE (OUTPATIENT)
Dept: RHEUMATOLOGY | Facility: CLINIC | Age: 55
End: 2020-05-14

## 2020-08-29 DIAGNOSIS — M48.062 LUMBAR STENOSIS WITH NEUROGENIC CLAUDICATION: ICD-10-CM

## 2020-09-01 NOTE — TELEPHONE ENCOUNTER
S/w pt regarding refill request, pt confirmed she is taking 300mg gabapentin at bedtime and it is working well at that dose  Denies s/e  Last prescribed 2/6 #30 with 5 refills  Please advise, thank you

## 2020-09-01 NOTE — TELEPHONE ENCOUNTER
Pt is calling for a refill    Medication: Gabapentin     Doseage: 300mg    Frequency: Once daily       Pt has 8 pills left    Please send to pharmacy on file

## 2020-09-02 DIAGNOSIS — M48.062 LUMBAR STENOSIS WITH NEUROGENIC CLAUDICATION: ICD-10-CM

## 2020-09-02 RX ORDER — GABAPENTIN 300 MG/1
CAPSULE ORAL
Qty: 30 CAPSULE | Refills: 0 | OUTPATIENT
Start: 2020-09-02

## 2020-09-02 RX ORDER — GABAPENTIN 300 MG/1
300 CAPSULE ORAL
Qty: 30 CAPSULE | Refills: 5 | Status: SHIPPED | OUTPATIENT
Start: 2020-09-02 | End: 2021-03-18 | Stop reason: SDUPTHER

## 2020-09-08 NOTE — TELEPHONE ENCOUNTER
LMOM for pt to cb  CB# and [place] provided  If pt calls back, please schedule 6 month office visit with NM for further refills  Thank you

## 2020-09-14 NOTE — TELEPHONE ENCOUNTER
2nd attempt to reach pt  Left vm for pt to c/b  C/B # and [place] provided  If pt c/b pls let her know that Stephanie Ordaz sent gabapentin script with 5 RF to her pharmacy and she needs to schedule a 6 month f/u with NM for any future RF or she can ask her PCP to take over prescribing the gabapentin

## 2020-09-18 NOTE — TELEPHONE ENCOUNTER
3rd attempt to reach pt  Left vm on cell for pt to c/b  C/B # and [place] provided  Home # attempted but message stated not in service  If pt c/b pls advise of info I have highlighted in previous message

## 2020-09-21 NOTE — TELEPHONE ENCOUNTER
Radha Pozo, we have made 3 attempts to reach pt about the gabapentin refills and that a 6 mo ov is needed for future RF or ask PCP to take over  We tried cell # 3 times and home # is not in service  Do want a can't reach you letter sent?

## 2020-10-30 ENCOUNTER — ANNUAL EXAM (OUTPATIENT)
Dept: OBGYN CLINIC | Facility: MEDICAL CENTER | Age: 55
End: 2020-10-30
Payer: COMMERCIAL

## 2020-10-30 VITALS
WEIGHT: 225.8 LBS | TEMPERATURE: 98 F | DIASTOLIC BLOOD PRESSURE: 82 MMHG | SYSTOLIC BLOOD PRESSURE: 110 MMHG | HEIGHT: 62 IN | BODY MASS INDEX: 41.55 KG/M2

## 2020-10-30 DIAGNOSIS — N83.209 CYST OF OVARY, UNSPECIFIED LATERALITY: Primary | ICD-10-CM

## 2020-10-30 DIAGNOSIS — N95.0 POSTMENOPAUSAL BLEEDING: ICD-10-CM

## 2020-10-30 DIAGNOSIS — Z01.419 GYNECOLOGIC EXAM NORMAL: ICD-10-CM

## 2020-10-30 DIAGNOSIS — Z12.31 ENCOUNTER FOR SCREENING MAMMOGRAM FOR MALIGNANT NEOPLASM OF BREAST: ICD-10-CM

## 2020-10-30 PROCEDURE — 99396 PREV VISIT EST AGE 40-64: CPT | Performed by: STUDENT IN AN ORGANIZED HEALTH CARE EDUCATION/TRAINING PROGRAM

## 2021-01-31 ENCOUNTER — HOSPITAL ENCOUNTER (EMERGENCY)
Facility: HOSPITAL | Age: 56
Discharge: HOME | End: 2021-01-31
Attending: EMERGENCY MEDICINE
Payer: COMMERCIAL

## 2021-01-31 VITALS
DIASTOLIC BLOOD PRESSURE: 68 MMHG | HEART RATE: 88 BPM | SYSTOLIC BLOOD PRESSURE: 129 MMHG | TEMPERATURE: 98.2 F | RESPIRATION RATE: 16 BRPM | OXYGEN SATURATION: 99 %
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
## 2021-01-31 DIAGNOSIS — M54.50 ACUTE BILATERAL LOW BACK PAIN WITHOUT SCIATICA: Primary | ICD-10-CM

## 2021-01-31 PROCEDURE — 99282 EMERGENCY DEPT VISIT SF MDM: CPT | Performed by: EMERGENCY MEDICINE

## 2021-01-31 PROCEDURE — 99284 EMERGENCY DEPT VISIT MOD MDM: CPT

## 2021-01-31 RX ORDER — IBUPROFEN 400 MG/1
400 TABLET ORAL ONCE
Status: COMPLETED | OUTPATIENT
Start: 2021-01-31 | End: 2021-01-31

## 2021-01-31 RX ORDER — ACETAMINOPHEN 325 MG/1
650 TABLET ORAL ONCE
Status: COMPLETED | OUTPATIENT
Start: 2021-01-31 | End: 2021-01-31

## 2021-01-31 RX ADMIN — ACETAMINOPHEN 650 MG: 325 TABLET, FILM COATED ORAL at 13:24

## 2021-01-31 RX ADMIN — IBUPROFEN 400 MG: 400 TABLET ORAL at 13:24
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
## 2021-03-17 ENCOUNTER — TELEPHONE (OUTPATIENT)
Dept: PAIN MEDICINE | Facility: CLINIC | Age: 56
End: 2021-03-17

## 2021-03-17 NOTE — TELEPHONE ENCOUNTER
Pt contacted Call Center requested refill of their medication  Medication Name:  Gabapentin     Dosage of Med:  300 mg     Frequency of Med:  Once at bedtime     Remaining Medication:  None left     Pharmacy and Location:  [address] [place] [address]       Pt  Preferred Callback Phone Number:      Thank you

## 2021-03-18 DIAGNOSIS — M48.062 LUMBAR STENOSIS WITH NEUROGENIC CLAUDICATION: ICD-10-CM

## 2021-03-18 RX ORDER — GABAPENTIN 300 MG/1
300 CAPSULE ORAL
Qty: 30 CAPSULE | Refills: 5 | Status: SHIPPED | OUTPATIENT
Start: 2021-03-18 | End: 2021-09-16 | Stop reason: SDUPTHER

## 2021-03-18 NOTE — TELEPHONE ENCOUNTER
Please reach back out to patient  Pt said that the medication is working there are no side effects   Patient is out of meds, and she needs a refill asap      Pt is also scheduled to come in on 4/8/21 w/ Dennise Logan

## 2021-03-18 NOTE — TELEPHONE ENCOUNTER
Per communication consent, [facility] informing pt that script sent  CB# provided for any further questions or concerns

## 2021-03-18 NOTE — TELEPHONE ENCOUNTER
Patient called in to check on the status of her refill  Please be advise thank you        Please call patient back @   [phone] [facility]

## 2021-04-08 ENCOUNTER — TRANSCRIBE ORDERS (OUTPATIENT)
Dept: PAIN MEDICINE | Facility: CLINIC | Age: 56
End: 2021-04-08

## 2021-04-08 ENCOUNTER — TELEPHONE (OUTPATIENT)
Dept: PAIN MEDICINE | Facility: CLINIC | Age: 56
End: 2021-04-08

## 2021-04-08 ENCOUNTER — OFFICE VISIT (OUTPATIENT)
Dept: PAIN MEDICINE | Facility: CLINIC | Age: 56
End: 2021-04-08
Payer: COMMERCIAL

## 2021-04-08 VITALS
RESPIRATION RATE: 16 BRPM | HEART RATE: 84 BPM | WEIGHT: 204 LBS | DIASTOLIC BLOOD PRESSURE: 75 MMHG | SYSTOLIC BLOOD PRESSURE: 114 MMHG | HEIGHT: 62 IN | BODY MASS INDEX: 37.54 KG/M2

## 2021-04-08 DIAGNOSIS — M48.062 SPINAL STENOSIS OF LUMBAR REGION WITH NEUROGENIC CLAUDICATION: ICD-10-CM

## 2021-04-08 DIAGNOSIS — G89.4 CHRONIC PAIN SYNDROME: Primary | ICD-10-CM

## 2021-04-08 DIAGNOSIS — M54.16 LUMBAR RADICULOPATHY: ICD-10-CM

## 2021-04-08 PROCEDURE — 99214 OFFICE O/P EST MOD 30 MIN: CPT | Performed by: NURSE PRACTITIONER
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
## 2021-05-04 ENCOUNTER — HOSPITAL ENCOUNTER (OUTPATIENT)
Dept: RADIOLOGY | Facility: CLINIC | Age: 56
Discharge: HOME | End: 2021-05-04
Attending: ANESTHESIOLOGY | Admitting: ANESTHESIOLOGY
Payer: COMMERCIAL

## 2021-05-04 VITALS
SYSTOLIC BLOOD PRESSURE: 95 MMHG | TEMPERATURE: 98.1 F | OXYGEN SATURATION: 95 % | RESPIRATION RATE: 20 BRPM | DIASTOLIC BLOOD PRESSURE: 65 MMHG | HEART RATE: 81 BPM

## 2021-05-04 DIAGNOSIS — M54.16 LUMBAR RADICULOPATHY: ICD-10-CM

## 2021-05-04 DIAGNOSIS — M48.062 SPINAL STENOSIS OF LUMBAR REGION WITH NEUROGENIC CLAUDICATION: ICD-10-CM

## 2021-05-04 PROCEDURE — 64483 NJX AA&/STRD TFRM EPI L/S 1: CPT | Performed by: ANESTHESIOLOGY

## 2021-05-04 RX ORDER — BUPIVACAINE HCL/PF 2.5 MG/ML
10 VIAL (ML) INJECTION ONCE
Status: COMPLETED | OUTPATIENT
Start: 2021-05-04 | End: 2021-05-04

## 2021-05-04 RX ORDER — 0.9 % SODIUM CHLORIDE 0.9 %
10 VIAL (ML) INJECTION ONCE
Status: COMPLETED | OUTPATIENT
Start: 2021-05-04 | End: 2021-05-04

## 2021-05-04 RX ORDER — METHYLPREDNISOLONE ACETATE 80 MG/ML
80 INJECTION, SUSPENSION INTRA-ARTICULAR; INTRALESIONAL; INTRAMUSCULAR; PARENTERAL; SOFT TISSUE ONCE
Status: COMPLETED | OUTPATIENT
Start: 2021-05-04 | End: 2021-05-04

## 2021-05-04 RX ADMIN — Medication 4 ML: at 08:31

## 2021-05-04 RX ADMIN — SODIUM CHLORIDE 4 ML: 9 INJECTION, SOLUTION INTRAMUSCULAR; INTRAVENOUS; SUBCUTANEOUS at 08:31

## 2021-05-04 RX ADMIN — BUPIVACAINE HYDROCHLORIDE 2 ML: 2.5 INJECTION, SOLUTION EPIDURAL; INFILTRATION; INTRACAUDAL at 08:32

## 2021-05-04 RX ADMIN — METHYLPREDNISOLONE ACETATE 80 MG: 80 INJECTION, SUSPENSION INTRA-ARTICULAR; INTRALESIONAL; INTRAMUSCULAR; PARENTERAL; SOFT TISSUE at 08:32

## 2021-05-04 RX ADMIN — IOHEXOL 1 ML: 300 INJECTION, SOLUTION INTRAVENOUS at 08:32

## 2021-05-04 NOTE — DISCHARGE INSTR - LAB
Epidural Steroid Injection   WHAT YOU NEED TO KNOW:   An epidural steroid injection (JAMEL) is a procedure to inject steroid medicine into the epidural space  The epidural space is between your spinal cord and vertebrae  Steroids reduce inflammation and fluid buildup in your spine that may be causing pain  You may be given pain medicine along with the steroids  ACTIVITY  · Do not drive or operate machinery today  · No strenuous activity today - bending, lifting, etc   · You may resume normal activites starting tomorrow - start slowly and as tolerated  · You may shower today, but no tub baths or hot tubs  · You may have numbness for several hours from the local anesthetic  Please use caution and common sense, especially with weight-bearing activities  CARE OF THE INJECTION SITE  · If you have soreness or pain, apply ice to the area today (20 minutes on/20 minutes off)  · Starting tomorrow, you may use warm, moist heat or ice if needed  · You may have an increase or change in your discomfort for 36-48 hours after your treatment  · Apply ice and continue with any pain medication you have been prescribed  · Notify [facility] if you have any of the following: redness, drainage, swelling, headache, stiff neck or fever above 100°F     SPECIAL INSTRUCTIONS  · Our office will contact you in approximately 7 days for a progress report  MEDICATIONS  · Continue to take all routine medications  · Our office may have instructed you to hold some medications  As no general anesthesia was used in today's procedure, you should not experience any side effects related to anesthesia  If you have a problem specifically related to your procedure, please call our office at [phone]  Problems not related to your procedure should be directed to your primary care physician

## 2021-05-04 NOTE — H&P
History of Present Illness: The patient is a 60 y o  female who presents with complaints of lower back pain secondary to spinal stenosis and is here today for bilateral L4 transforaminal epidural steroid injection      Patient Active Problem List   Diagnosis    Lumbar stenosis with neurogenic claudication    Essential hypertension    Generalized osteoarthritis    Hyperlipidemia    Lump, breast    Postmenopausal bleeding    Gynecologic exam normal    Low libido    Intervertebral disc disorder with radiculopathy of lumbar region       Past Medical History:   Diagnosis Date    Hypertension     Rheumatoid arthritis ([address] )        Past Surgical History:   Procedure Laterality Date     SECTION           Current Outpatient Medications:     Acetaminophen (TYLENOL ARTHRITIS PAIN PO), Take by mouth, Disp: , Rfl:     amLODIPine-benazepril (LOTREL) 10-40 MG per capsule, , Disp: , Rfl:     aspirin 81 mg chewable tablet, Chew 81 mg daily, Disp: , Rfl:     atorvastatin (LIPITOR) 20 mg tablet, , Disp: , Rfl:     gabapentin (NEURONTIN) 300 mg capsule, Take 1 capsule (300 mg total) by mouth daily at bedtime, Disp: 30 capsule, Rfl: 5    meloxicam (MOBIC) 15 mg tablet, , Disp: , Rfl:     Multiple Vitamins-Minerals (CENTRUM SILVER PO), Take by mouth, Disp: , Rfl:     pantoprazole (PROTONIX) 40 mg tablet, , Disp: , Rfl:     spironolactone (ALDACTONE) 25 mg tablet, , Disp: , Rfl:     Current Facility-Administered Medications:     bupivacaine (PF) (MARCAINE) 0 25 % injection 10 mL, 10 mL, Epidural, Once, Aleyda Kamara MD    iohexol (OMNIPAQUE) 300 mg/mL injection 50 mL, 50 mL, Epidural, Once, Aleyda Kamara MD    lidocaine (PF) (XYLOCAINE-MPF) 2 % injection 5 mL, 5 mL, Infiltration, Once, Aleyda Kamara MD    methylPREDNISolone acetate (DEPO-MEDROL) injection 80 mg, 80 mg, Epidural, Once, Aleyda Kamara MD    sodium chloride (PF) 0 9 % injection 10 mL, 10 mL, Infiltration, Once, Darwyn Duck, MD    Allergies   Allergen Reactions    Sulfa Antibiotics        Physical Exam:   Vitals:    05/04/21 0817   BP: 95/65   Pulse:    Resp:    Temp:    SpO2:      General: Awake, Alert, Oriented x 3, Mood and affect appropriate  Respiratory: Respirations even and unlabored  Cardiovascular: Peripheral pulses intact; no edema  Musculoskeletal Exam:  Lower back tenderness    ASA Score: 2    Patient/Chart Verification  Patient ID Verified: Verbal  ID Band Applied: No  Consents Confirmed: Procedural, To be obtained in the Pre-Procedure area  H&P( within 30 days) Verified: To be obtained in the Pre-Procedure area  Interval H&P(within 24 hr) Complete (required for Outpatients and Surgery Admit only): To be obtained in the Pre-Procedure area  Allergies Reviewed: Yes  Anticoag/NSAID held?: NA  Currently on antibiotics?: No  Pregnancy denied?: NA    Assessment:   1  Spinal stenosis of lumbar region with neurogenic claudication    2   Lumbar radiculopathy        Plan: bilateral L4 TFESI

## 2021-05-04 NOTE — DISCHARGE INSTRUCTIONS
Epidural Steroid Injection   WHAT YOU NEED TO KNOW:   An epidural steroid injection (JAMEL) is a procedure to inject steroid medicine into the epidural space  The epidural space is between your spinal cord and vertebrae  Steroids reduce inflammation and fluid buildup in your spine that may be causing pain  You may be given pain medicine along with the steroids  ACTIVITY  · Do not drive or operate machinery today  · No strenuous activity today - bending, lifting, etc   · You may resume normal activites starting tomorrow - start slowly and as tolerated  · You may shower today, but no tub baths or hot tubs  · You may have numbness for several hours from the local anesthetic  Please use caution and common sense, especially with weight-bearing activities  CARE OF THE INJECTION SITE  · If you have soreness or pain, apply ice to the area today (20 minutes on/20 minutes off)  · Starting tomorrow, you may use warm, moist heat or ice if needed  · You may have an increase or change in your discomfort for 36-48 hours after your treatment  · Apply ice and continue with any pain medication you have been prescribed  · Notify [facility] if you have any of the following: redness, drainage, swelling, headache, stiff neck or fever above 100°F     SPECIAL INSTRUCTIONS  · Our office will contact you in approximately 7 days for a progress report  MEDICATIONS  · Continue to take all routine medications  · Our office may have instructed you to hold some medications  As no general anesthesia was used in today's procedure, you should not experience any side effects related to anesthesia  If you have a problem specifically related to your procedure, please call our office at [phone]  Problems not related to your procedure should be directed to your primary care physician

## 2021-05-11 ENCOUNTER — TELEPHONE (OUTPATIENT)
Dept: PAIN MEDICINE | Facility: CLINIC | Age: 56
End: 2021-05-11

## 2021-07-09 ENCOUNTER — APPOINTMENT (OUTPATIENT)
Dept: LAB | Facility: MEDICAL CENTER | Age: 56
End: 2021-07-09
Payer: COMMERCIAL

## 2022-01-06 ENCOUNTER — HOSPITAL ENCOUNTER (EMERGENCY)
Facility: HOSPITAL | Age: 57
Discharge: HOME | End: 2022-01-06
Attending: EMERGENCY MEDICINE | Admitting: EMERGENCY MEDICINE
Payer: COMMERCIAL

## 2022-01-06 VITALS
OXYGEN SATURATION: 96 % | DIASTOLIC BLOOD PRESSURE: 56 MMHG | TEMPERATURE: 98.7 F | RESPIRATION RATE: 18 BRPM | HEART RATE: 75 BPM | SYSTOLIC BLOOD PRESSURE: 112 MMHG

## 2022-01-06 DIAGNOSIS — R55 SYNCOPE: Primary | ICD-10-CM

## 2022-01-06 LAB
2HR DELTA HS TROPONIN: 0 NG/L
ALBUMIN SERPL BCP-MCNC: 3.8 G/DL (ref 3.5–5)
ALP SERPL-CCNC: 71 U/L (ref 46–116)
ALT SERPL W P-5'-P-CCNC: 39 U/L (ref 12–78)
ANION GAP SERPL CALCULATED.3IONS-SCNC: 5 MMOL/L (ref 4–13)
AST SERPL W P-5'-P-CCNC: 22 U/L (ref 5–45)
ATRIAL RATE: 97 BPM
BASOPHILS # BLD AUTO: 0.02 THOUSANDS/ΜL (ref 0–0.1)
BASOPHILS NFR BLD AUTO: 0 % (ref 0–1)
BILIRUB SERPL-MCNC: 0.74 MG/DL (ref 0.2–1)
BUN SERPL-MCNC: 14 MG/DL (ref 5–25)
CALCIUM SERPL-MCNC: 9 MG/DL (ref 8.3–10.1)
CARDIAC TROPONIN I PNL SERPL HS: 2 NG/L
CARDIAC TROPONIN I PNL SERPL HS: 2 NG/L
CHLORIDE SERPL-SCNC: 99 MMOL/L (ref 100–108)
CO2 SERPL-SCNC: 32 MMOL/L (ref 21–32)
CREAT SERPL-MCNC: 0.9 MG/DL (ref 0.6–1.3)
EOSINOPHIL # BLD AUTO: 0.01 THOUSAND/ΜL (ref 0–0.61)
EOSINOPHIL NFR BLD AUTO: 0 % (ref 0–6)
ERYTHROCYTE [DISTWIDTH] IN BLOOD BY AUTOMATED COUNT: 12.6 % (ref 11.6–15.1)
GFR SERPL CREATININE-BSD FRML MDRD: 70 ML/MIN/1.73SQ M
GLUCOSE SERPL-MCNC: 116 MG/DL (ref 65–140)
HCT VFR BLD AUTO: 40.5 % (ref 34.8–46.1)
HGB BLD-MCNC: 13.9 G/DL (ref 11.5–15.4)
IMM GRANULOCYTES # BLD AUTO: 0.02 THOUSAND/UL (ref 0–0.2)
IMM GRANULOCYTES NFR BLD AUTO: 0 % (ref 0–2)
LYMPHOCYTES # BLD AUTO: 1.22 THOUSANDS/ΜL (ref 0.6–4.47)
LYMPHOCYTES NFR BLD AUTO: 23 % (ref 14–44)
MCH RBC QN AUTO: 30 PG (ref 26.8–34.3)
MCHC RBC AUTO-ENTMCNC: 34.3 G/DL (ref 31.4–37.4)
MCV RBC AUTO: 88 FL (ref 82–98)
MONOCYTES # BLD AUTO: 0.5 THOUSAND/ΜL (ref 0.17–1.22)
MONOCYTES NFR BLD AUTO: 9 % (ref 4–12)
NEUTROPHILS # BLD AUTO: 3.58 THOUSANDS/ΜL (ref 1.85–7.62)
NEUTS SEG NFR BLD AUTO: 68 % (ref 43–75)
NRBC BLD AUTO-RTO: 0 /100 WBCS
P AXIS: 22 DEGREES
PLATELET # BLD AUTO: 226 THOUSANDS/UL (ref 149–390)
PMV BLD AUTO: 10.4 FL (ref 8.9–12.7)
POTASSIUM SERPL-SCNC: 4 MMOL/L (ref 3.5–5.3)
PR INTERVAL: 136 MS
PROT SERPL-MCNC: 7.1 G/DL (ref 6.4–8.2)
QRS AXIS: 21 DEGREES
QRSD INTERVAL: 68 MS
QT INTERVAL: 348 MS
QTC INTERVAL: 433 MS
RBC # BLD AUTO: 4.63 MILLION/UL (ref 3.81–5.12)
SODIUM SERPL-SCNC: 136 MMOL/L (ref 136–145)
T WAVE AXIS: 36 DEGREES
VENTRICULAR RATE: 93 BPM
WBC # BLD AUTO: 5.35 THOUSAND/UL (ref 4.31–10.16)

## 2022-01-06 PROCEDURE — 80053 COMPREHEN METABOLIC PANEL: CPT | Performed by: EMERGENCY MEDICINE

## 2022-01-06 PROCEDURE — 99284 EMERGENCY DEPT VISIT MOD MDM: CPT

## 2022-01-06 PROCEDURE — 85025 COMPLETE CBC W/AUTO DIFF WBC: CPT | Performed by: EMERGENCY MEDICINE

## 2022-01-06 PROCEDURE — 84484 ASSAY OF TROPONIN QUANT: CPT | Performed by: EMERGENCY MEDICINE

## 2022-01-06 PROCEDURE — 93005 ELECTROCARDIOGRAM TRACING: CPT

## 2022-01-06 PROCEDURE — 36415 COLL VENOUS BLD VENIPUNCTURE: CPT

## 2022-01-06 PROCEDURE — 99284 EMERGENCY DEPT VISIT MOD MDM: CPT | Performed by: EMERGENCY MEDICINE

## 2022-01-06 PROCEDURE — 93010 ELECTROCARDIOGRAM REPORT: CPT | Performed by: INTERNAL MEDICINE

## 2022-01-06 NOTE — Clinical Note
Maricel Leroy was seen and treated in our emergency department on 1/6/2022  May work as tolerated    Diagnosis: Syncope    Jazlyn Dumont  may return to work on return date  She may return on this date: 01/11/2022         If you have any questions or concerns, please don't hesitate to call        Mickey Abraham MD    ______________________________           _______________          _______________  Hospital Representative                              Date                                Time

## 2022-01-07 NOTE — ED PROVIDER NOTES
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, was standing at the time and developed tunnel vision, muffled hearing, lightheadedness, was able to tell co-worker she was not feeling well and was assisted to the floor by co-worker  Apparently brief syncope  Afterwards just reports some discomfort in her head  There is no apparent trauma  Denies any prior history of syncope  No palpitations, chest pain, shortness of breath  She was feeling well earlier today  Eating and drinking normally  No recurrent symptoms  Unremarkable exam   Stable for discharge home, outpatient follow-up  Advised to return to the ED if her symptoms worsen or she has recurrent episodes  Prior to Admission Medications   Prescriptions Last Dose Informant Patient Reported? Taking?    Acetaminophen (TYLENOL ARTHRITIS PAIN PO)  Self Yes No   Sig: Take by mouth   Multiple Vitamins-Minerals (CENTRUM SILVER PO)  Self Yes No   Sig: Take by mouth   amLODIPine-benazepril (LOTREL) 10-40 MG per capsule  Self Yes No   aspirin 81 mg chewable tablet  Self Yes No   Sig: Chew 81 mg daily   atorvastatin (LIPITOR) 20 mg tablet  Self Yes No   gabapentin (NEURONTIN) 300 mg capsule   No No   Sig: Take 1 capsule (300 mg total) by mouth daily at bedtime   meloxicam (MOBIC) 15 mg tablet  Self Yes No   pantoprazole (PROTONIX) 40 mg tablet  Self Yes No   spironolactone (ALDACTONE) 25 mg tablet  Self Yes No      Facility-Administered Medications: None       Past Medical History:   Diagnosis Date    Hypertension     Rheumatoid arthritis ([facility] [address] )        Past Surgical History:   Procedure Laterality Date     SECTION         Family History   Problem Relation Age of Onset    Heart disease Mother     Hypertension Mother     Diabetes Mother     No Known Problems Father     No Known Problems Maternal Grandmother     No Known Problems Maternal Grandfather     No Known Problems Paternal Grandmother     Prostate cancer Paternal Grandfather 79    Ovarian cancer Paternal Aunt     Endometrial cancer Paternal Aunt 39    Breast cancer Maternal Aunt 79    No Known Problems Brother     No Known Problems Brother     No Known Problems Brother     No Known Problems Son      I have reviewed and agree with the history as documented  E-Cigarette/Vaping    E-Cigarette Use Never User      E-Cigarette/Vaping Substances    Nicotine No     THC No     CBD No     Flavoring No     Other No     Unknown No      Social History     Tobacco Use    Smoking status: Former Smoker    Smokeless tobacco: Never Used   Vaping Use    Vaping Use: Never used   Substance Use Topics    Alcohol use: Yes     Comment: Occasionally    Drug use: No       Review of Systems   Constitutional: Negative for activity change, appetite change, fatigue and fever  Respiratory: Negative for cough, chest tightness and shortness of breath  Cardiovascular: Negative for chest pain and palpitations  Gastrointestinal: Negative for abdominal pain, nausea and vomiting  Musculoskeletal: Negative for back pain and neck pain  Skin: Negative for color change  Neurological: Positive for syncope  Negative for dizziness, weakness and headaches  All other systems reviewed and are negative  Physical Exam  Physical Exam  Vitals and nursing note reviewed  Constitutional:       Appearance: Normal appearance  HENT:      Head: Normocephalic and atraumatic  Neck:      Vascular: No carotid bruit  Cardiovascular:      Rate and Rhythm: Normal rate and regular rhythm  Pulses: Normal pulses        Heart sounds: Normal heart sounds  Pulmonary:      Effort: Pulmonary effort is normal       Breath sounds: Normal breath sounds  Musculoskeletal:         General: No swelling  Normal range of motion  Cervical back: Normal range of motion and neck supple  Neurological:      General: No focal deficit present  Mental Status: She is alert and oriented to person, place, and time     Psychiatric:         Mood and Affect: Mood normal          Behavior: Behavior normal          Vital Signs  ED Triage Vitals [01/06/22 1534]   Temperature Pulse Respirations Blood Pressure SpO2   98 7 °F (37 1 °C) 75 18 112/56 96 %      Temp Source Heart Rate Source Patient Position - Orthostatic VS BP Location FiO2 (%)   Oral Monitor Sitting Left arm --      Pain Score       --           Vitals:    01/06/22 1534   BP: 112/56   Pulse: 75   Patient Position - Orthostatic VS: Sitting         Visual Acuity      ED Medications  Medications - No data to display    Diagnostic Studies  Results Reviewed     Procedure Component Value Units Date/Time    HS Troponin I 2hr [[id number]] Collected: 01/06/22 1738    Lab Status: Final result Specimen: Blood from Arm, Right Updated: 01/06/22 1828     hs TnI 2hr 2 ng/L      Delta 2hr hsTnI 0 ng/L     Comprehensive metabolic panel [[id number]]  (Abnormal) Collected: 01/06/22 1539    Lab Status: Final result Specimen: Blood from Arm, Right Updated: 01/06/22 1637     Sodium 136 mmol/L      Potassium 4 0 mmol/L      Chloride 99 mmol/L      CO2 32 mmol/L      ANION GAP 5 mmol/L      BUN 14 mg/dL      Creatinine 0 90 mg/dL      Glucose 116 mg/dL      Calcium 9 0 mg/dL      AST 22 U/L      ALT 39 U/L      Alkaline Phosphatase 71 U/L      Total Protein 7 1 g/dL      Albumin 3 8 g/dL      Total Bilirubin 0 74 mg/dL      eGFR 70 ml/min/1 73sq m     Narrative:      [facility] guidelines for Chronic Kidney Disease (CKD):     Stage 1 with normal or high GFR (GFR > 90 mL/min/1 73 square meters)    Stage 2 Mild CKD (GFR = 60-89 mL/min/1 73 square meters)    Stage 3A Moderate CKD (GFR = 45-59 mL/min/1 73 square meters)    Stage 3B Moderate CKD (GFR = 30-44 mL/min/1 73 square meters)    Stage 4 Severe CKD (GFR = 15-29 mL/min/1 73 square meters)    Stage 5 End Stage CKD (GFR <15 mL/min/1 73 square meters)  Note: GFR calculation is accurate only with a steady state creatinine    HS Troponin 0hr (reflex protocol) [[id number]]  (Normal) Collected: 01/06/22 1539    Lab Status: Final result Specimen: Blood from Arm, Right Updated: 01/06/22 1633     hs TnI 0hr 2 ng/L     CBC and differential [[id number]] Collected: 01/06/22 1539    Lab Status: Final result Specimen: Blood from Arm, Right Updated: 01/06/22 1604     WBC 5 35 Thousand/uL      RBC 4 63 Million/uL      Hemoglobin 13 9 g/dL      Hematocrit 40 5 %      MCV 88 fL      MCH 30 0 pg      MCHC 34 3 g/dL      RDW 12 6 %      MPV 10 4 fL      Platelets 828 Thousands/uL      nRBC 0 /100 WBCs      Neutrophils Relative 68 %      Immat GRANS % 0 %      Lymphocytes Relative 23 %      Monocytes Relative 9 %      Eosinophils Relative 0 %      Basophils Relative 0 %      Neutrophils Absolute 3 58 Thousands/µL      Immature Grans Absolute 0 02 Thousand/uL      Lymphocytes Absolute 1 22 Thousands/µL      Monocytes Absolute 0 50 Thousand/µL      Eosinophils Absolute 0 01 Thousand/µL      Basophils Absolute 0 02 Thousands/µL                  No orders to display              Procedures  ECG 12 Lead Documentation Only    Date/Time: 1/6/2022 7:32 PM  Performed by: Carlos Barrientos MD  Authorized by: Carlos Barrientos MD     Indications / Diagnosis:  Syncope  ECG reviewed by me, the ED Provider: yes    Patient location:  ED  Previous ECG:     Previous ECG:  Unavailable  Rate:     ECG rate:  93  Rhythm:     Rhythm: sinus rhythm    Ectopy:     Ectopy: none    QRS:     QRS axis:  Normal  Conduction:     Conduction: normal    ST segments:     ST segments:  Normal  T waves:     T waves: normal               ED Course                                             MDM  Number of Diagnoses or Management Options  Syncope: new and requires workup  Diagnosis management comments: 58-year-old female with syncopal episode at work today with presyncopal symptoms  No recurrence  Unclear etiology  Her workup is unremarkable  Normal labs, EKG  Unremarkable exam   Stable for discharge  Amount and/or Complexity of Data Reviewed  Clinical lab tests: ordered and reviewed  Independent visualization of images, tracings, or specimens: yes    Patient Progress  Patient progress: improved      Disposition  Final diagnoses:   Syncope     Time reflects when diagnosis was documented in both MDM as applicable and the Disposition within this note     Time User Action Codes Description Comment    1/6/2022  7:46 PM Josefina Astorga Add [R55] Syncope       ED Disposition     ED Disposition Condition Date/Time Comment    Discharge Stable Thu Jan 6, 2022  7:46 PM Virginia Barry discharge to home/self care  Follow-up Information     Follow up With Specialties Details Why Contact Info Additional Nini Krishnan DO Family Medicine   [address] [facility]   [address] [facility] [phone] [address] [facility] [address] [facility], [address] [place] [address], [facility]    [facility] Emergency Department Emergency Medicine  If symptoms worsen [address] [facility] [address] [facility] Emergency Department, [address], [place], [place], [address]          Patient's Medications   Discharge Prescriptions    No medications on file       No discharge procedures on file      PDMP Review     None          ED Provider  Electronically Signed by           Kamilla Latham Raphael Santa MD  01/06/22 8963

## 2022-03-10 ENCOUNTER — TELEPHONE (OUTPATIENT)
Dept: PAIN MEDICINE | Facility: CLINIC | Age: 57
End: 2022-03-10

## 2022-03-10 NOTE — TELEPHONE ENCOUNTER
Pt contacted Call Center requested refill of their medication  Medication Name:gabapentin (NEURONTIN          Dosage of Med: 300 mg       Frequency of Med:Take 1 capsule (300 mg total) by mouth daily at bedtime      Remaining Medication: 4      Pharmacy and Location:  [address] [place] [address]   Phone:  [phone]      Pt  Preferred Callback Phone Number: [phone]      Thank you

## 2022-03-10 NOTE — TELEPHONE ENCOUNTER
S/w pt, she is requesting a RF of Gabapentin 300 mg at HS  Pt tolerating with no s/e  RN advised pt will need to see her in office for a follow up, as she has not been seen in a year  Scheduled pt for 4/6 with [facility]     Stephanie Ordaz can you send in a 30 day supply to [facility] on file

## 2022-04-06 ENCOUNTER — TELEPHONE (OUTPATIENT)
Dept: PAIN MEDICINE | Facility: CLINIC | Age: 57
End: 2022-04-06

## 2022-04-06 ENCOUNTER — OFFICE VISIT (OUTPATIENT)
Dept: PAIN MEDICINE | Facility: CLINIC | Age: 57
End: 2022-04-06
Payer: COMMERCIAL

## 2022-04-06 VITALS — SYSTOLIC BLOOD PRESSURE: 122 MMHG | DIASTOLIC BLOOD PRESSURE: 76 MMHG | HEIGHT: 62 IN | BODY MASS INDEX: 37.31 KG/M2

## 2022-04-06 DIAGNOSIS — M48.062 LUMBAR STENOSIS WITH NEUROGENIC CLAUDICATION: ICD-10-CM

## 2022-04-06 PROCEDURE — 99214 OFFICE O/P EST MOD 30 MIN: CPT

## 2022-04-06 RX ORDER — GABAPENTIN 300 MG/1
300 CAPSULE ORAL
Qty: 30 CAPSULE | Refills: 5 | Status: SHIPPED | OUTPATIENT
Start: 2022-04-06

## 2022-04-06 NOTE — PROGRESS NOTES
Assessment:  1  Lumbar stenosis with neurogenic claudication        Plan:  The patient is a 38-year-old female with a history of chronic pain secondary to low back pain, lumbar spondylosis, lumbar stenosis and lumbar radiculopathy  She presents today with ongoing low back pain and pain into bilateral thighs  In order to provide her relief of her low back pain I will order a repeat bilateral L4 TFESI to be done under fluoro  Complete risks and benefits including bleeding, infection, tissue reaction, nerve injury and allergic reaction were discussed  Refills were gabapentin or also electronically sent to the patient's pharmacy  My impressions and treatment recommendations were discussed in detail with the patient who verbalized understanding and had no further questions  Discharge instructions were provided  I personally saw and examined the patient and I agree with the above discussed plan of care  Orders Placed This Encounter   Procedures    [place] spine and pain procedure     Dr Cadence Alva     Standing Status:   Future     Standing Expiration Date:   4/6/2026     Order Specific Question:   Reason for Exam:     Answer:   Bilateral L4 TFESI     Order Specific Question:   Is the patient pregnant? Answer:   No     Order Specific Question:   Anticoagulant hold needed? Answer:   No     New Medications Ordered This Visit   Medications    gabapentin (NEURONTIN) 300 mg capsule     Sig: Take 1 capsule (300 mg total) by mouth daily at bedtime     Dispense:  30 capsule     Refill:  5       History of Present Illness:  Kenny Aquino is a 60 y o  female who presents for a follow up office visit in regards to back pain and bilateral thigh pain  She has a history of chronic pain secondary to low back pain, lumbar spondylosis, lumbar stenosis and lumbar radiculopathy    She was last seen on 05/04/2021 where she underwent a bilateral L4 TFESI which has provided her relief of her low back pain and radicular symptoms since   She now presents to the office with worsening low back pain  She states her pain is the same since her last office visit and constant  She states the pain starts in her low back and travels to the anterior aspect of bilateral thighs  She rates the quality of her pain as throbbing and numbness and is currently rating it a 5/10 on a numeric scale  She is currently taking gabapentin 300 mg daily at bedtime which is providing her relief of her pain symptoms at this time  She states she needs refills for this medication  I have personally reviewed and/or updated the patient's past medical history, past surgical history, family history, social history, current medications, allergies, and vital signs today  Review of Systems   Constitutional: Positive for appetite change and chills  HENT: Negative  Eyes: Negative  Respiratory: Negative  Cardiovascular: Negative  Gastrointestinal: Negative  Endocrine: Negative  Genitourinary: Negative  Musculoskeletal: Positive for arthralgias, back pain and neck pain  Skin: Negative  Allergic/Immunologic: Negative  Neurological: Positive for numbness  Hematological: Negative  Psychiatric/Behavioral: Negative          Patient Active Problem List   Diagnosis    Lumbar stenosis with neurogenic claudication    Essential hypertension    Generalized osteoarthritis    Hyperlipidemia    Lump, breast    Postmenopausal bleeding    Gynecologic exam normal    Low libido    Intervertebral disc disorder with radiculopathy of lumbar region       Past Medical History:   Diagnosis Date    Hypertension     Rheumatoid arthritis ([address] )        Past Surgical History:   Procedure Laterality Date     SECTION         Family History   Problem Relation Age of Onset    Heart disease Mother     Hypertension Mother     Diabetes Mother     No Known Problems Father     No Known Problems Maternal Grandmother     No Known Problems Maternal Grandfather     No Known Problems Paternal Grandmother     Prostate cancer Paternal Grandfather 79    Ovarian cancer Paternal Aunt     Endometrial cancer Paternal Aunt 39    Breast cancer Maternal Aunt 79    No Known Problems Brother     No Known Problems Brother     No Known Problems Brother     No Known Problems Son        Social History     Occupational History    Not on file   Tobacco Use    Smoking status: Former Smoker    Smokeless tobacco: Never Used   Vaping Use    Vaping Use: Never used   Substance and Sexual Activity    Alcohol use: Yes     Comment: Occasionally    Drug use: No    Sexual activity: Not Currently       Current Outpatient Medications on File Prior to Visit   Medication Sig    Acetaminophen (TYLENOL ARTHRITIS PAIN PO) Take by mouth    amLODIPine-benazepril (LOTREL) 10-40 MG per capsule     aspirin 81 mg chewable tablet Chew 81 mg daily    atorvastatin (LIPITOR) 20 mg tablet     meloxicam (MOBIC) 15 mg tablet     Multiple Vitamins-Minerals (CENTRUM SILVER PO) Take by mouth    pantoprazole (PROTONIX) 40 mg tablet     spironolactone (ALDACTONE) 25 mg tablet     [DISCONTINUED] gabapentin (NEURONTIN) 300 mg capsule Take 1 capsule (300 mg total) by mouth daily at bedtime     No current facility-administered medications on file prior to visit  Allergies   Allergen Reactions    Sulfa Antibiotics        Physical Exam:    /76   Ht 5' 2" (1 575 m)   LMP 09/26/2020 (Exact Date)   BMI 37 31 kg/m²     Constitutional:normal, well developed, well nourished, alert, in no distress and non-toxic and no overt pain behavior    Eyes:anicteric  HEENT:grossly intact  Neck:supple, symmetric, trachea midline and no masses   Pulmonary:even and unlabored  Cardiovascular:No edema or pitting edema present  Skin:Normal without rashes or lesions and well hydrated  Psychiatric:Mood and affect appropriate  Neurologic:Cranial Nerves II-XII grossly intact  Musculoskeletal:normal    Lumbar Spine Exam    Appearance:  Normal lordosis  Palpation/Tenderness:  left lumbar paraspinal tenderness  right lumbar paraspinal tenderness  Sensory:  no sensory deficits noted  Motor Strength:  Left hip flexion:  5/5  Right hip flexion:  5/5  Left knee flexion:  5/5  Left knee extension:  5/5  Right knee flexion:  5/5  Right knee extension:  5/5  Left foot dorsiflexion:  5/5  Left foot plantar flexion:  5/5  Right foot dorsiflexion:  5/5  Right foot plantar flexion:  5/5  Special Tests:  Left Randolph's Maneuver:  negative  Right Randolph's Maneuver:  negative    Imaging  MRI LUMBAR SPINE WITHOUT CONTRAST     INDICATION: Chronic bilateral leg pain        COMPARISON:  None      TECHNIQUE:  Sagittal T1, sagittal T2, sagittal inversion recovery, axial T1 and axial T2, coronal T2        IMAGE QUALITY:  Diagnostic     FINDINGS:     ALIGNMENT:  Slight anterolisthesis L4 on 5 measures approximately 5 mm      MARROW SIGNAL:  Hemangioma L1      DISTAL CORD AND CONUS:  Normal size and signal within the distal cord and conus  The conus ends at the L1-L2 level      PARASPINAL SOFT TISSUES:  Paraspinal soft tissues are unremarkable      SACRUM:  Normal signal within the sacrum  No evidence of insufficiency or stress fracture      LOWER THORACIC DISC SPACES:  Normal disc height and signal   No disc herniation, canal stenosis or foraminal narrowing      LUMBAR DISC SPACES:     L1-L2:  Mild bulge  No significant central or foraminal narrowing      L2-L3:  No significant central or foraminal narrowing with small right-sided marginal osteophyte      L3-L4:  Mild facet hypertrophy  Mild annular bulging with small marginal osteophyte on the left  There is mild left foraminal narrowing  Disc material approaches but does not displace the extraforaminal left L3 nerve root      L4-L5:  Severe facet degenerative change accounts for slight anterolisthesis  Moderate to severe central stenosis with severe right lateral recess stenosis  Correlate for right L5 radiculopathy      L5-S1:  Severe facet degenerative change  No significant central or foraminal narrowing      IMPRESSION:     Severe facet degenerative change L4-5 and L5-S1 accounts for 5 mm anterolisthesis L4-5      Moderate to severe central stenosis and severe right lateral recess stenosis L4-5  Correlate for right L5 radiculopathy      Degenerative changes L3-4 noted with disc material approaching but not displacing the extraforaminal left L3 nerve root

## 2022-04-08 NOTE — TELEPHONE ENCOUNTER
Scheduled pt for Tfesi on 5/6/22  Pt denies rx blood thinners  Went over pre-procedure instructions below:  Nothing to eat or drink 1 hr prior to procedure  Need to arrange transportation  Proper clothing for procedure  If ill or placed on antibiotics please call to reschedule  Vaccine instructions

## 2022-05-06 ENCOUNTER — HOSPITAL ENCOUNTER (OUTPATIENT)
Dept: RADIOLOGY | Facility: CLINIC | Age: 57
Discharge: HOME | End: 2022-05-06
Attending: ANESTHESIOLOGY
Payer: COMMERCIAL

## 2022-05-06 VITALS
RESPIRATION RATE: 20 BRPM | OXYGEN SATURATION: 96 % | HEART RATE: 67 BPM | SYSTOLIC BLOOD PRESSURE: 102 MMHG | TEMPERATURE: 97 F | DIASTOLIC BLOOD PRESSURE: 65 MMHG

## 2022-05-06 DIAGNOSIS — M48.062 LUMBAR STENOSIS WITH NEUROGENIC CLAUDICATION: ICD-10-CM

## 2022-05-06 PROCEDURE — 64483 NJX AA&/STRD TFRM EPI L/S 1: CPT | Performed by: ANESTHESIOLOGY

## 2022-05-06 RX ORDER — 0.9 % SODIUM CHLORIDE 0.9 %
4 VIAL (ML) INJECTION ONCE
Status: COMPLETED | OUTPATIENT
Start: 2022-05-06 | End: 2022-05-06

## 2022-05-06 RX ORDER — AMLODIPINE BESYLATE AND BENAZEPRIL HYDROCHLORIDE 5; 40 MG/1; MG/1
1 CAPSULE ORAL DAILY
COMMUNITY

## 2022-05-06 RX ORDER — BUPIVACAINE HCL/PF 2.5 MG/ML
2 VIAL (ML) INJECTION ONCE
Status: COMPLETED | OUTPATIENT
Start: 2022-05-06 | End: 2022-05-06

## 2022-05-06 RX ORDER — METHYLPREDNISOLONE ACETATE 80 MG/ML
80 INJECTION, SUSPENSION INTRA-ARTICULAR; INTRALESIONAL; INTRAMUSCULAR; PARENTERAL; SOFT TISSUE ONCE
Status: COMPLETED | OUTPATIENT
Start: 2022-05-06 | End: 2022-05-06

## 2022-05-06 RX ADMIN — Medication 4 ML: at 09:31

## 2022-05-06 RX ADMIN — BUPIVACAINE HYDROCHLORIDE 2 ML: 2.5 INJECTION, SOLUTION EPIDURAL; INFILTRATION; INTRACAUDAL at 09:33

## 2022-05-06 RX ADMIN — METHYLPREDNISOLONE ACETATE 80 MG: 80 INJECTION, SUSPENSION INTRA-ARTICULAR; INTRALESIONAL; INTRAMUSCULAR; PARENTERAL; SOFT TISSUE at 09:33

## 2022-05-06 RX ADMIN — IOHEXOL 1 ML: 300 INJECTION, SOLUTION INTRAVENOUS at 09:33

## 2022-05-06 NOTE — H&P
History of Present Illness:  The patient is a 60 y o  female who presents with complaints of lower back and leg pain and is here today for bilateral L4 transforaminal epidural steroid injection    Patient Active Problem List   Diagnosis    Lumbar stenosis with neurogenic claudication    Essential hypertension    Generalized osteoarthritis    Hyperlipidemia    Lump, breast    Postmenopausal bleeding    Gynecologic exam normal    Low libido    Intervertebral disc disorder with radiculopathy of lumbar region       Past Medical History:   Diagnosis Date    Hypertension     Rheumatoid arthritis ([address] )        Past Surgical History:   Procedure Laterality Date     SECTION           Current Outpatient Medications:     amLODIPine-benazepril (LOTREL) 5-40 MG per capsule, Take 1 capsule by mouth daily, Disp: , Rfl:     Acetaminophen (TYLENOL ARTHRITIS PAIN PO), Take by mouth, Disp: , Rfl:     aspirin 81 mg chewable tablet, Chew 81 mg daily, Disp: , Rfl:     atorvastatin (LIPITOR) 20 mg tablet, , Disp: , Rfl:     gabapentin (NEURONTIN) 300 mg capsule, Take 1 capsule (300 mg total) by mouth daily at bedtime, Disp: 30 capsule, Rfl: 5    meloxicam (MOBIC) 15 mg tablet, , Disp: , Rfl:     Multiple Vitamins-Minerals (CENTRUM SILVER PO), Take by mouth, Disp: , Rfl:     pantoprazole (PROTONIX) 40 mg tablet, , Disp: , Rfl:     spironolactone (ALDACTONE) 25 mg tablet, , Disp: , Rfl:     Current Facility-Administered Medications:     bupivacaine (PF) (MARCAINE) 0 25 % injection 2 mL, 2 mL, Epidural, Once, Clarence Smith MD    iohexol (OMNIPAQUE) 300 mg/mL injection 1 mL, 1 mL, Epidural, Once, Clarence Smith MD    lidocaine (PF) (XYLOCAINE-MPF) 2 % injection 4 mL, 4 mL, Infiltration, Once, Clarence Smith MD    methylPREDNISolone acetate (DEPO-MEDROL) injection 80 mg, 80 mg, Epidural, Once, Clarence Smith MD    sodium chloride (PF) 0 9 % injection 4 mL, 4 mL, Infiltration, Once, Silver Hides, MD    Allergies   Allergen Reactions    Sulfa Antibiotics        Physical Exam:   Vitals:    05/06/22 0921   BP: 98/67   Pulse: 73   Resp: 20   Temp: (!) 97 °F (36 1 °C)   SpO2: 97%     General: Awake, Alert, Oriented x 3, Mood and affect appropriate  Respiratory: Respirations even and unlabored  Cardiovascular: Peripheral pulses intact; no edema  Musculoskeletal Exam:  Lower back pain    ASA Score: 3    Patient/Chart Verification  Patient ID Verified: Verbal  Consents Confirmed: To be obtained in the Pre-Procedure area  Interval H&P(within 24 hr) Complete (required for Outpatients and Surgery Admit only): To be obtained in the Pre-Procedure area  Allergies Reviewed: Yes  Anticoag/NSAID held?: NA  Currently on antibiotics?: No    Assessment:   1   Lumbar stenosis with neurogenic claudication        Plan: Bilateral L4 TFESI

## 2022-05-06 NOTE — DISCHARGE INSTR - LAB
Epidural Steroid Injection   WHAT YOU NEED TO KNOW:   An epidural steroid injection (JAMEL) is a procedure to inject steroid medicine into the epidural space  The epidural space is between your spinal cord and vertebrae  Steroids reduce inflammation and fluid buildup in your spine that may be causing pain  You may be given pain medicine along with the steroids  ACTIVITY  Do not drive or operate machinery today  No strenuous activity today - bending, lifting, etc   You may resume normal activites starting tomorrow - start slowly and as tolerated  You may shower today, but no tub baths or hot tubs  You may have numbness for several hours from the local anesthetic  Please use caution and common sense, especially with weight-bearing activities  CARE OF THE INJECTION SITE  If you have soreness or pain, apply ice to the area today (20 minutes on/20 minutes off)  Starting tomorrow, you may use warm, moist heat or ice if needed  You may have an increase or change in your discomfort for 36-48 hours after your treatment  Apply ice and continue with any pain medication you have been prescribed  Notify the [facility] if you have any of the following: redness, drainage, swelling, headache, stiff neck or fever above 100°F     SPECIAL INSTRUCTIONS  Our office will contact you in approximately 7 days for a progress report  MEDICATIONS  Continue to take all routine medications  Our office may have instructed you to hold some medications  As no general anesthesia was used in today's procedure, you should not experience any side effects related to anesthesia  If you have a problem specifically related to your procedure, please call our office at [phone]  Problems not related to your procedure should be directed to your primary care physician

## 2022-05-13 ENCOUNTER — TELEPHONE (OUTPATIENT)
Dept: PAIN MEDICINE | Facility: CLINIC | Age: 57
End: 2022-05-13

## 2022-08-11 ENCOUNTER — APPOINTMENT (OUTPATIENT)
Dept: LAB | Facility: MEDICAL CENTER | Age: 57
End: 2022-08-11
Payer: COMMERCIAL

## 2022-08-11 DIAGNOSIS — E87.6 HYPOPOTASSEMIA: ICD-10-CM

## 2022-08-11 DIAGNOSIS — I10 ESSENTIAL HYPERTENSION, MALIGNANT: ICD-10-CM

## 2022-08-11 DIAGNOSIS — E78.2 MIXED HYPERLIPIDEMIA: ICD-10-CM

## 2022-08-11 LAB
ALBUMIN SERPL BCP-MCNC: 4 G/DL (ref 3.5–5)
ALP SERPL-CCNC: 56 U/L (ref 46–116)
ALT SERPL W P-5'-P-CCNC: 51 U/L (ref 12–78)
ANION GAP SERPL CALCULATED.3IONS-SCNC: 4 MMOL/L (ref 4–13)
AST SERPL W P-5'-P-CCNC: 32 U/L (ref 5–45)
BILIRUB SERPL-MCNC: 0.92 MG/DL (ref 0.2–1)
BUN SERPL-MCNC: 15 MG/DL (ref 5–25)
CALCIUM SERPL-MCNC: 10.4 MG/DL (ref 8.3–10.1)
CHLORIDE SERPL-SCNC: 106 MMOL/L (ref 96–108)
CHOLEST SERPL-MCNC: 131 MG/DL
CO2 SERPL-SCNC: 29 MMOL/L (ref 21–32)
CREAT SERPL-MCNC: 0.73 MG/DL (ref 0.6–1.3)
GFR SERPL CREATININE-BSD FRML MDRD: 91 ML/MIN/1.73SQ M
GLUCOSE P FAST SERPL-MCNC: 94 MG/DL (ref 65–99)
HDLC SERPL-MCNC: 50 MG/DL
LDLC SERPL CALC-MCNC: 64 MG/DL (ref 0–100)
NONHDLC SERPL-MCNC: 81 MG/DL
POTASSIUM SERPL-SCNC: 4.6 MMOL/L (ref 3.5–5.3)
PROT SERPL-MCNC: 7.2 G/DL (ref 6.4–8.4)
SODIUM SERPL-SCNC: 139 MMOL/L (ref 135–147)
TRIGL SERPL-MCNC: 85 MG/DL

## 2022-08-11 PROCEDURE — 80061 LIPID PANEL: CPT

## 2022-08-11 PROCEDURE — 36415 COLL VENOUS BLD VENIPUNCTURE: CPT

## 2022-08-11 PROCEDURE — 80053 COMPREHEN METABOLIC PANEL: CPT

## 2022-08-25 ENCOUNTER — APPOINTMENT (OUTPATIENT)
Dept: LAB | Facility: MEDICAL CENTER | Age: 57
End: 2022-08-25
Payer: COMMERCIAL

## 2022-08-25 DIAGNOSIS — E83.52 HYPERCALCEMIA: ICD-10-CM

## 2022-08-25 LAB
25(OH)D3 SERPL-MCNC: 36.9 NG/ML (ref 30–100)
PHOSPHATE SERPL-MCNC: 3.5 MG/DL (ref 2.7–4.5)
PTH-INTACT SERPL-MCNC: 45.6 PG/ML (ref 18.4–80.1)
TSH SERPL DL<=0.05 MIU/L-ACNC: 1.48 UIU/ML (ref 0.45–4.5)

## 2022-08-25 PROCEDURE — 84100 ASSAY OF PHOSPHORUS: CPT

## 2022-08-25 PROCEDURE — 83970 ASSAY OF PARATHORMONE: CPT

## 2022-08-25 PROCEDURE — 84443 ASSAY THYROID STIM HORMONE: CPT

## 2022-08-25 PROCEDURE — 36415 COLL VENOUS BLD VENIPUNCTURE: CPT

## 2022-08-25 PROCEDURE — 84165 PROTEIN E-PHORESIS SERUM: CPT | Performed by: PATHOLOGY

## 2022-08-25 PROCEDURE — 82306 VITAMIN D 25 HYDROXY: CPT

## 2022-08-25 PROCEDURE — 84165 PROTEIN E-PHORESIS SERUM: CPT

## 2022-08-27 LAB
ALBUMIN SERPL ELPH-MCNC: 4.52 G/DL (ref 3.5–5)
ALBUMIN SERPL ELPH-MCNC: 66.5 % (ref 52–65)
ALPHA1 GLOB SERPL ELPH-MCNC: 0.27 G/DL (ref 0.1–0.4)
ALPHA1 GLOB SERPL ELPH-MCNC: 3.9 % (ref 2.5–5)
ALPHA2 GLOB SERPL ELPH-MCNC: 0.57 G/DL (ref 0.4–1.2)
ALPHA2 GLOB SERPL ELPH-MCNC: 8.4 % (ref 7–13)
BETA GLOB ABNORMAL SERPL ELPH-MCNC: 0.39 G/DL (ref 0.4–0.8)
BETA1 GLOB SERPL ELPH-MCNC: 5.8 % (ref 5–13)
BETA2 GLOB SERPL ELPH-MCNC: 4.3 % (ref 2–8)
BETA2+GAMMA GLOB SERPL ELPH-MCNC: 0.29 G/DL (ref 0.2–0.5)
GAMMA GLOB ABNORMAL SERPL ELPH-MCNC: 0.75 G/DL (ref 0.5–1.6)
GAMMA GLOB SERPL ELPH-MCNC: 11.1 % (ref 12–22)
IGG/ALB SER: 1.99 {RATIO} (ref 1.1–1.8)
PROT PATTERN SERPL ELPH-IMP: ABNORMAL
PROT SERPL-MCNC: 6.8 G/DL (ref 6.4–8.2)

## 2022-09-28 DIAGNOSIS — M48.062 LUMBAR STENOSIS WITH NEUROGENIC CLAUDICATION: ICD-10-CM

## 2022-09-28 RX ORDER — GABAPENTIN 300 MG/1
300 CAPSULE ORAL
Qty: 30 CAPSULE | Refills: 5 | Status: SHIPPED | OUTPATIENT
Start: 2022-09-28 | End: 2023-03-20 | Stop reason: SDUPTHER

## 2023-03-08 ENCOUNTER — APPOINTMENT (OUTPATIENT)
Dept: LAB | Facility: MEDICAL CENTER | Age: 58
End: 2023-03-08

## 2023-03-08 DIAGNOSIS — E83.52 HYPERCALCEMIA: ICD-10-CM

## 2023-03-08 LAB
25(OH)D3 SERPL-MCNC: 31.7 NG/ML (ref 30–100)
CALCIUM SERPL-MCNC: 10 MG/DL (ref 8.3–10.1)
PHOSPHATE SERPL-MCNC: 3.7 MG/DL (ref 2.7–4.5)
PTH-INTACT SERPL-MCNC: 46.3 PG/ML (ref 18.4–80.1)
TSH SERPL DL<=0.05 MIU/L-ACNC: 1.27 UIU/ML (ref 0.45–4.5)

## 2023-03-09 LAB
ALBUMIN SERPL ELPH-MCNC: 4.25 G/DL (ref 3.5–5)
ALBUMIN SERPL ELPH-MCNC: 63.4 % (ref 52–65)
ALPHA1 GLOB SERPL ELPH-MCNC: 0.27 G/DL (ref 0.1–0.4)
ALPHA1 GLOB SERPL ELPH-MCNC: 4.1 % (ref 2.5–5)
ALPHA2 GLOB SERPL ELPH-MCNC: 0.63 G/DL (ref 0.4–1.2)
ALPHA2 GLOB SERPL ELPH-MCNC: 9.4 % (ref 7–13)
BETA GLOB ABNORMAL SERPL ELPH-MCNC: 0.41 G/DL (ref 0.4–0.8)
BETA1 GLOB SERPL ELPH-MCNC: 6.1 % (ref 5–13)
BETA2 GLOB SERPL ELPH-MCNC: 4.5 % (ref 2–8)
BETA2+GAMMA GLOB SERPL ELPH-MCNC: 0.3 G/DL (ref 0.2–0.5)
GAMMA GLOB ABNORMAL SERPL ELPH-MCNC: 0.84 G/DL (ref 0.5–1.6)
GAMMA GLOB SERPL ELPH-MCNC: 12.5 % (ref 12–22)
IGG/ALB SER: 1.73 {RATIO} (ref 1.1–1.8)
PROT PATTERN SERPL ELPH-IMP: NORMAL
PROT SERPL-MCNC: 6.7 G/DL (ref 6.4–8.2)

## 2023-03-16 ENCOUNTER — TELEPHONE (OUTPATIENT)
Dept: PAIN MEDICINE | Facility: CLINIC | Age: 58
End: 2023-03-16

## 2023-03-16 DIAGNOSIS — M48.062 LUMBAR STENOSIS WITH NEUROGENIC CLAUDICATION: ICD-10-CM

## 2023-03-16 RX ORDER — GABAPENTIN 300 MG/1
CAPSULE ORAL
Qty: 30 CAPSULE | Refills: 0 | OUTPATIENT
Start: 2023-03-16

## 2023-03-20 RX ORDER — GABAPENTIN 300 MG/1
300 CAPSULE ORAL
Qty: 30 CAPSULE | Refills: 5 | Status: SHIPPED | OUTPATIENT
Start: 2023-03-20

## 2023-03-20 NOTE — TELEPHONE ENCOUNTER
Pt contacted Call Center requested refill of their medication  Medication Name: gabapentin      Dosage of Med: 300      Frequency of Med: daily      Remaining Medication: 10      Pharmacy and Location: [address] [place] [address] [phone]          Pt  Preferred Callback Phone Number:       Thank you  PLEASE ADVISE PATIENTS:    REFILL REQUESTS WILL BE PROCESSED WITHIN 24-48 HOURS

## 2023-03-20 NOTE — TELEPHONE ENCOUNTER
Pt confirmed she is taking the gabapentin once a bedtime and it is helpful  Pls send RF to her Sebastien on file  This was LP on 9/28/22 w/ 5 RF, lovs was 4/6/22 w/ Marshal Zeng  Do you want an ovs scheduled for any future RF?

## 2023-03-20 NOTE — TELEPHONE ENCOUNTER
Left detailed message informing pt that a new script of gabapentin is sent to [facility] today  Cb provided, informed she need to schedule ov with Faisal Ackerman at [facility]

## 2023-06-01 ENCOUNTER — OFFICE VISIT (OUTPATIENT)
Dept: OBGYN CLINIC | Facility: MEDICAL CENTER | Age: 58
End: 2023-06-01

## 2023-06-01 ENCOUNTER — APPOINTMENT (OUTPATIENT)
Dept: RADIOLOGY | Facility: MEDICAL CENTER | Age: 58
End: 2023-06-01
Payer: COMMERCIAL

## 2023-06-01 VITALS
SYSTOLIC BLOOD PRESSURE: 114 MMHG | DIASTOLIC BLOOD PRESSURE: 75 MMHG | HEART RATE: 79 BPM | HEIGHT: 62 IN | WEIGHT: 193.4 LBS | BODY MASS INDEX: 35.59 KG/M2

## 2023-06-01 DIAGNOSIS — R20.2 PARESTHESIA OF HAND, BILATERAL: ICD-10-CM

## 2023-06-01 DIAGNOSIS — M79.642 BILATERAL HAND PAIN: Primary | ICD-10-CM

## 2023-06-01 DIAGNOSIS — M79.641 BILATERAL HAND PAIN: Primary | ICD-10-CM

## 2023-06-01 DIAGNOSIS — M79.641 BILATERAL HAND PAIN: ICD-10-CM

## 2023-06-01 DIAGNOSIS — G56.03 BILATERAL CARPAL TUNNEL SYNDROME: ICD-10-CM

## 2023-06-01 DIAGNOSIS — M79.642 BILATERAL HAND PAIN: ICD-10-CM

## 2023-06-01 PROCEDURE — 73130 X-RAY EXAM OF HAND: CPT

## 2023-06-01 NOTE — PROGRESS NOTES
1  Bilateral hand pain  XR hand 3+ vw left    XR hand 3+ vw right    US MSK limited      2  Paresthesia of hand, bilateral  US MSK limited      3  Bilateral carpal tunnel syndrome          Orders Placed This Encounter   Procedures   • XR hand 3+ vw left   • XR hand 3+ vw right   • US MSK limited      Impression:  Bilateral hand pain likely secondary to IP joint osteoarthritis and carpal tunnel syndrome  We discussed different treatment options and decided to proceed with Voltaren gel, nighttime wrist bracing and obtaining MSK ultrasound study for bilateral carpal tunnel  We also discussed starting hand therapy but decided to hold off on this for now  I will see her back after the ultrasound study  Can consider referral to hand surgery depending on presentation and ultrasound study findings  Imaging Studies (I personally reviewed images in PACS and report):  Bilateral hand x-rays most recent to this encounter reviewed  These images show bilateral fifth DIP osteoarthritis  No acute osseous abnormalities  Return for MSK US study review  Patient is in agreement with the above plan  HPI:  Patrick Rosa is a 59 y o  female  who presents for evaluation of   Chief Complaint   Patient presents with   • Right Hand - Pain   • Left Hand - Pain       Onset/Mechanism: Chronic pain for over a year without an injury  Location: In her fingers  Radiation: Left is worse than right  Provocative: It just happens  Sleep  Severity: Getting worse  Associated Symptoms: Numbness/tingling at night  Treatment so far: Meloxicam and gabapentin      Following history reviewed and updated:  Past Medical History:   Diagnosis Date   • Hypertension    • Rheumatoid arthritis ([address] )      Past Surgical History:   Procedure Laterality Date   •  SECTION       Social History   Social History     Substance and Sexual Activity   Alcohol Use Yes    Comment: Occasionally     Social History     Substance and Sexual Activity   Drug "Use No     Social History     Tobacco Use   Smoking Status Former   Smokeless Tobacco Never     Family History   Problem Relation Age of Onset   • Heart disease Mother    • Hypertension Mother    • Diabetes Mother    • No Known Problems Father    • No Known Problems Maternal Grandmother    • No Known Problems Maternal Grandfather    • No Known Problems Paternal Grandmother    • Prostate cancer Paternal Grandfather 79   • Ovarian cancer Paternal Aunt    • Endometrial cancer Paternal Aunt 45   • Breast cancer Maternal Aunt 79   • No Known Problems Brother    • No Known Problems Brother    • No Known Problems Brother    • No Known Problems Son      Allergies   Allergen Reactions   • Sulfa Antibiotics         Constitutional:  /75   Pulse 79   Ht 5' 2\" (1 575 m)   Wt 87 7 kg (193 lb 6 4 oz)   LMP 09/26/2020 (Exact Date)   BMI 35 37 kg/m²    General: NAD  Eyes: Anicteric sclerae  Neck: Supple  Lungs: Unlabored breathing  Cardiovascular: No lower extremity edema  Skin: Intact without erythema  Neurologic: Sensation intact to light touch  Psychiatric: Mood and affect are appropriate  Right Hand Exam     Tenderness   Right hand tenderness location: 5th DIP  Range of Motion   The patient has normal right wrist ROM  Muscle Strength   The patient has normal right wrist strength  Tests   Phalen’s sign: positive  Tinel's sign (median nerve): negative    Other   Erythema: absent  Scars: absent  Sensation: normal  Pulse: present    Comments:  Heberden's node  Left Hand Exam     Tenderness   Left hand tenderness location: 5th PIP and DIP  Range of Motion   The patient has normal left wrist ROM  Muscle Strength   The patient has normal left wrist strength  Tests   Phalen’s sign: positive  Tinel's sign (median nerve): negative  Finkelstein's test: negative    Other   Erythema: absent  Scars: absent  Sensation: normal  Pulse: present    Comments:  Heberden's node           " Procedures

## 2023-06-01 NOTE — PATIENT INSTRUCTIONS
Room temperature/warm soaks with epsom salt can help with muscle tightness/cramping  Epsom salt releases magnesium which can be helpful  You can obtain diclofenac cream/gel/ointment over the counter  Many brands make this medication and they include Voltaren, Aspercreme and Aleve  You can use this up to 3 times per day  It is best to wash the area with water, pat dry and then apply  The cream absorbs better after this  Be careful not to let any pets or children get in contact with the medication as it can be harmful to them      If you develop a skin reaction, stop using the cream

## 2023-06-19 ENCOUNTER — TELEPHONE (OUTPATIENT)
Dept: OBGYN CLINIC | Facility: MEDICAL CENTER | Age: 58
End: 2023-06-19

## 2023-06-19 NOTE — TELEPHONE ENCOUNTER
Called and left message  Dr Evy Robbins is out of office on 8/16/2023 and need to reschedule your appointment  Please call [phone] to reschedule

## 2023-06-19 NOTE — PROGRESS NOTES
Assessment/Plan:  Calcium 9996-2716 mg (in divided doses-max 600 mg at one time) + 600-1000 IU Vit D daily  Exercise 150 minutes per week minimum including weight bearing exercises  Pap with high risk HPV Q 5 years, if normal   Due   Annual mammogram ordered by PCP (cc'd myself) and monthly breast self exam recommended  Colonoscopy-  Due         Kegels 20 times twice daily  Silicone based lubricant with sex  (Use water based lubricant with condoms or sexual toys )   Vaginal moisturizers twice weekly as needed  May consider vaginal estrogen in near future  Return to office in one year or sooner, if needed  1  Encntr for gyn exam (general) (routine) w abnormal findings    2  Encounter for screening mammogram for breast cancer    3  Vaginal atrophy    4  BMI 35 0-35 9,adult               Subjective:      Patient ID: Racquel Aleman is a 59 y o  female  HPI    Racquel Aleman is a 59 y o   ( 29 to boy ) female who is here today for her annual visit  No gynecologic health concerns  Stable medical conditions  Post menopausal x 3 years with no vaginal bleeding since  Does have hot flashes, worse at night  Exercise- not regularly  Works FT  at [employer]  Racquel Aleman is not sexually active with male partner/  of 27 years  This is acceptable to her but not necessarily her   Feels safe in this relationship  Denies vaginal pain,bleeding or dryness  She uses menopause and abstinence  for contraception  She is not interested in STD screening today  She denies vaginal discharge, itching or pelvic pain  She has no urinary concerns, does not have incontinence  No bowel concerns  No breast concerns       Last pap: 10/23/2019 normal with negative HR HPV  DEXA scan: N/A  Mammogram: 2019 normal  Colonoscopy: 10/07/2020 5 year recall       Family history of cancer:   Cancer-related family history includes Breast cancer (age of onset: 79) in her "maternal aunt; Endometrial cancer (age of onset: 39) in her paternal aunt; Ovarian cancer in her paternal aunt; Prostate cancer (age of onset: 79) in her paternal grandfather  There is no history of Colon cancer  The following portions of the patient's history were reviewed and updated as appropriate: allergies, current medications, past family history, past medical history, past social history, past surgical history and problem list     Review of Systems   Constitutional: Negative  Negative for activity change, appetite change, chills, diaphoresis, fatigue, fever and unexpected weight change  HENT: Negative for congestion, dental problem, sneezing, sore throat and trouble swallowing  Eyes: Negative for visual disturbance  Respiratory: Negative for chest tightness and shortness of breath  Cardiovascular: Negative for chest pain and leg swelling  Gastrointestinal: Negative for abdominal pain, constipation, diarrhea, nausea and vomiting  Genitourinary: Negative for difficulty urinating, dysuria, frequency, hematuria, pelvic pain, urgency, vaginal bleeding, vaginal discharge and vaginal pain  Musculoskeletal: Negative for back pain and neck pain  Skin: Negative  Allergic/Immunologic: Negative  Neurological: Negative for weakness and headaches  Hematological: Negative for adenopathy  Psychiatric/Behavioral: Negative  Objective:      /70 (BP Location: Left arm, Patient Position: Sitting, Cuff Size: Standard)   Ht 5' 1 5\" (1 562 m)   Wt 87 kg (191 lb 12 8 oz)   LMP 09/26/2020 (Exact Date)   BMI 35 65 kg/m²          Physical Exam  Vitals and nursing note reviewed  Constitutional:       Appearance: Normal appearance  She is well-developed  She is obese  HENT:      Head: Normocephalic  Neck:      Thyroid: No thyromegaly  Cardiovascular:      Rate and Rhythm: Normal rate and regular rhythm  Heart sounds: Normal heart sounds     Pulmonary:      Effort: " Pulmonary effort is normal       Breath sounds: Normal breath sounds  Chest:   Breasts:     Breasts are symmetrical       Right: Normal  No inverted nipple, mass, nipple discharge, skin change or tenderness  Left: Normal  No inverted nipple, mass, nipple discharge, skin change or tenderness  Abdominal:      Palpations: Abdomen is soft  Genitourinary:     General: Normal vulva  Exam position: Lithotomy position  Labia:         Right: No rash, tenderness, lesion or injury  Left: No rash, tenderness, lesion or injury  Urethra: No prolapse, urethral pain, urethral swelling or urethral lesion  Vagina: No signs of injury and foreign body  No vaginal discharge, erythema, tenderness, bleeding, lesions or prolapsed vaginal walls  Cervix: Normal       Uterus: Normal        Adnexa: Right adnexa normal and left adnexa normal         Right: No mass, tenderness or fullness  Left: No mass, tenderness or fullness  Rectum: No external hemorrhoid  Comments: Vulvovaginal atrophy-tenderness with speculum exam  Musculoskeletal:         General: Normal range of motion  Cervical back: Normal range of motion  Lymphadenopathy:      Head:      Right side of head: No submental, submandibular, tonsillar or occipital adenopathy  Left side of head: No submental, submandibular, tonsillar or occipital adenopathy  Upper Body:      Right upper body: No supraclavicular or axillary adenopathy  Left upper body: No supraclavicular or axillary adenopathy  Lower Body: No right inguinal adenopathy  No left inguinal adenopathy  Skin:     General: Skin is warm and dry  Neurological:      Mental Status: She is alert and oriented to person, place, and time  Psychiatric:         Mood and Affect: Mood normal          Behavior: Behavior normal  Behavior is cooperative

## 2023-06-20 ENCOUNTER — ANNUAL EXAM (OUTPATIENT)
Dept: OBGYN CLINIC | Facility: MEDICAL CENTER | Age: 58
End: 2023-06-20
Payer: COMMERCIAL

## 2023-06-20 VITALS
BODY MASS INDEX: 35.3 KG/M2 | SYSTOLIC BLOOD PRESSURE: 112 MMHG | HEIGHT: 62 IN | WEIGHT: 191.8 LBS | DIASTOLIC BLOOD PRESSURE: 70 MMHG

## 2023-06-20 DIAGNOSIS — Z12.31 ENCOUNTER FOR SCREENING MAMMOGRAM FOR BREAST CANCER: ICD-10-CM

## 2023-06-20 DIAGNOSIS — N95.2 VAGINAL ATROPHY: ICD-10-CM

## 2023-06-20 DIAGNOSIS — Z01.411 ENCNTR FOR GYN EXAM (GENERAL) (ROUTINE) W ABNORMAL FINDINGS: Primary | ICD-10-CM

## 2023-06-20 PROCEDURE — 99396 PREV VISIT EST AGE 40-64: CPT | Performed by: NURSE PRACTITIONER

## 2023-06-20 NOTE — PATIENT INSTRUCTIONS
Calcium 5768-5491 mg (in divided doses-max 600 mg at one time) + 600-1000 IU Vit D daily  Exercise 150 minutes per week minimum including weight bearing exercises  Pap with high risk HPV Q 5 years, if normal   Due 2024  Annual mammogram ordered by PCP (cc'd myself) and monthly breast self exam recommended  Colonoscopy-  Due 2025        Kegels 20 times twice daily  Silicone based lubricant with sex  (Use water based lubricant with condoms or sexual toys )   Vaginal moisturizers twice weekly as needed  May consider vaginal estrogen in near future  Return to office in one year or sooner, if needed

## 2023-06-22 ENCOUNTER — HOSPITAL ENCOUNTER (OUTPATIENT)
Dept: RADIOLOGY | Facility: MEDICAL CENTER | Age: 58
Discharge: HOME | End: 2023-06-22
Payer: COMMERCIAL

## 2023-06-22 VITALS — HEIGHT: 62 IN | BODY MASS INDEX: 35.15 KG/M2 | WEIGHT: 191 LBS

## 2023-06-22 DIAGNOSIS — Z12.31 ENCOUNTER FOR SCREENING MAMMOGRAM FOR MALIGNANT NEOPLASM OF BREAST: ICD-10-CM

## 2023-06-22 PROCEDURE — 77063 BREAST TOMOSYNTHESIS BI: CPT

## 2023-06-22 PROCEDURE — 77067 SCR MAMMO BI INCL CAD: CPT

## 2023-06-26 ENCOUNTER — TELEPHONE (OUTPATIENT)
Dept: OBGYN CLINIC | Facility: CLINIC | Age: 58
End: 2023-06-26

## 2023-06-26 NOTE — TELEPHONE ENCOUNTER
Called patient and left a voicemail per communication consent on file to let her know her recent mammogram was normal, she should continue with her monthly self breast exams and we will see her back in one year for her annual

## 2023-08-30 ENCOUNTER — APPOINTMENT (OUTPATIENT)
Dept: LAB | Facility: MEDICAL CENTER | Age: 58
End: 2023-08-30
Payer: COMMERCIAL

## 2023-09-14 DIAGNOSIS — M48.062 LUMBAR STENOSIS WITH NEUROGENIC CLAUDICATION: ICD-10-CM

## 2023-09-14 RX ORDER — GABAPENTIN 300 MG/1
300 CAPSULE ORAL
Qty: 30 CAPSULE | Refills: 0 | OUTPATIENT
Start: 2023-09-14

## 2023-10-02 ENCOUNTER — TELEPHONE (OUTPATIENT)
Age: 58
End: 2023-10-02

## 2023-10-02 DIAGNOSIS — M48.062 LUMBAR STENOSIS WITH NEUROGENIC CLAUDICATION: ICD-10-CM

## 2023-10-02 RX ORDER — GABAPENTIN 300 MG/1
300 CAPSULE ORAL
Qty: 30 CAPSULE | Refills: 5 | Status: SHIPPED | OUTPATIENT
Start: 2023-10-02

## 2023-10-02 NOTE — TELEPHONE ENCOUNTER
Left detailed message asking if pt can c/b to provide further detail of sx. CB# provided for any questions.

## 2023-10-02 NOTE — TELEPHONE ENCOUNTER
S/w pt. Pt requested repeat B/L L4 TFESI prev compl 5/6/22 FQ. Current pain level: 8/10    Percentage relief from previous inj: 100%    Duration of pain relief from previous inj: 6-7mo, pain gradually returned to same as prior to inj. Same pain as prior to inj: yes, LB --> B/L LE/Hips (aching)    LOV 4/6/22 ASp    Med rf: Pt also rqst Fred 100mg @  rf.    Please advise. Thank you!

## 2023-10-02 NOTE — TELEPHONE ENCOUNTER
Caller: Jose Ramon Leone    Doctor: Graham Contreras    Reason for call: Pt would like to come in to get a procedure or if she has to schedule a visit with a NP first. Pt may be at work and stated its ok to leave a msg on vm if needed.      Please advise    Call back#: [phone]

## 2023-10-03 NOTE — TELEPHONE ENCOUNTER
Left message for patient to contact the scheduling office at [phone] to schedule their procedure.

## 2023-10-11 NOTE — TELEPHONE ENCOUNTER
Caller: Janelle Smith     Doctor/Office: Dr Gage Urena    Call regarding :       Call was transferred to:

## 2023-11-02 ENCOUNTER — HOSPITAL ENCOUNTER (OUTPATIENT)
Dept: RADIOLOGY | Facility: CLINIC | Age: 58
End: 2023-11-02
Payer: COMMERCIAL

## 2023-11-02 VITALS
HEART RATE: 63 BPM | SYSTOLIC BLOOD PRESSURE: 105 MMHG | TEMPERATURE: 97.3 F | DIASTOLIC BLOOD PRESSURE: 72 MMHG | OXYGEN SATURATION: 98 % | RESPIRATION RATE: 20 BRPM

## 2023-11-02 DIAGNOSIS — M51.16 INTERVERTEBRAL DISC DISORDER WITH RADICULOPATHY OF LUMBAR REGION: ICD-10-CM

## 2023-11-02 PROCEDURE — 64483 NJX AA&/STRD TFRM EPI L/S 1: CPT | Performed by: ANESTHESIOLOGY

## 2023-11-02 RX ORDER — BUPIVACAINE HCL/PF 2.5 MG/ML
2 VIAL (ML) INJECTION ONCE
Status: COMPLETED | OUTPATIENT
Start: 2023-11-02 | End: 2023-11-02

## 2023-11-02 RX ORDER — METHYLPREDNISOLONE ACETATE 80 MG/ML
80 INJECTION, SUSPENSION INTRA-ARTICULAR; INTRALESIONAL; INTRAMUSCULAR; PARENTERAL; SOFT TISSUE ONCE
Status: COMPLETED | OUTPATIENT
Start: 2023-11-02 | End: 2023-11-02

## 2023-11-02 RX ORDER — AMLODIPINE BESYLATE AND BENAZEPRIL HYDROCHLORIDE 5; 20 MG/1; MG/1
1 CAPSULE ORAL DAILY
COMMUNITY

## 2023-11-02 RX ORDER — 0.9 % SODIUM CHLORIDE 0.9 %
4 VIAL (ML) INJECTION ONCE
Status: COMPLETED | OUTPATIENT
Start: 2023-11-02 | End: 2023-11-02

## 2023-11-02 RX ADMIN — Medication 4 ML: at 10:26

## 2023-11-02 RX ADMIN — BUPIVACAINE HYDROCHLORIDE 2 ML: 2.5 INJECTION, SOLUTION EPIDURAL; INFILTRATION; INTRACAUDAL at 10:26

## 2023-11-02 RX ADMIN — IOHEXOL 1 ML: 300 INJECTION, SOLUTION INTRAVENOUS at 10:26

## 2023-11-02 RX ADMIN — METHYLPREDNISOLONE ACETATE 80 MG: 80 INJECTION, SUSPENSION INTRA-ARTICULAR; INTRALESIONAL; INTRAMUSCULAR; PARENTERAL; SOFT TISSUE at 10:26

## 2023-11-02 NOTE — H&P
History of Present Illness:  The patient is a 59 y.o. female who presents with complaints of lower back and leg pain and is here today for bilateral L4 transforaminal epidural steroid injection    Past Medical History:   Diagnosis Date    Hypertension     Rheumatoid arthritis ([address])        Past Surgical History:   Procedure Laterality Date     SECTION           Current Outpatient Medications:     amLODIPine-benazepril (LOTREL 5-20) 5-20 MG per capsule, Take 1 capsule by mouth daily, Disp: , Rfl:     Acetaminophen (TYLENOL ARTHRITIS PAIN PO), Take by mouth, Disp: , Rfl:     aspirin 81 mg chewable tablet, Chew 81 mg daily, Disp: , Rfl:     atorvastatin (LIPITOR) 20 mg tablet, , Disp: , Rfl:     gabapentin (NEURONTIN) 300 mg capsule, Take 1 capsule (300 mg total) by mouth daily at bedtime, Disp: 30 capsule, Rfl: 5    meloxicam (MOBIC) 15 mg tablet, , Disp: , Rfl:     Multiple Vitamins-Minerals (CENTRUM SILVER PO), Take by mouth, Disp: , Rfl:     spironolactone (ALDACTONE) 25 mg tablet, , Disp: , Rfl:     Current Facility-Administered Medications:     bupivacaine (PF) (MARCAINE) 0.25 % injection 2 mL, 2 mL, Epidural, Once, Rosa Chau MD    iohexol (OMNIPAQUE) 300 mg/mL injection 1 mL, 1 mL, Epidural, Once, Rosa Chau MD    lidocaine (PF) (XYLOCAINE-MPF) 2 % injection 4 mL, 4 mL, Infiltration, Once, Rosa Chau MD    methylPREDNISolone acetate (DEPO-MEDROL) injection 80 mg, 80 mg, Epidural, Once, Rosa Chau MD    sodium chloride (PF) 0.9 % injection 4 mL, 4 mL, Infiltration, Once, Rosa Chau MD    Allergies   Allergen Reactions    Sulfa Antibiotics        Physical Exam:   Vitals:    23 0957   BP: 102/70   Pulse: 65   Resp: 20   Temp: (!) 97.3 °F (36.3 °C)   SpO2: 97%     General: Awake, Alert, Oriented x 3, Mood and affect appropriate  Respiratory: Respirations even and unlabored  Cardiovascular: Peripheral pulses intact; no edema  Musculoskeletal Exam: Lower back and leg pain    ASA Score: 3    Patient/Chart Verification  Patient ID Verified: Verbal  Consents Confirmed: To be obtained in the Pre-Procedure area  Interval H&P(within 24 hr) Complete (required for Outpatients and Surgery Admit only): To be obtained in the Pre-Procedure area  Allergies Reviewed: Yes  Anticoag/NSAID held?: NA  Currently on antibiotics?: No    Assessment:   1.  Intervertebral disc disorder with radiculopathy of lumbar region        Plan: B/L L4 TFESI

## 2023-11-02 NOTE — DISCHARGE INSTR - LAB
Epidural Steroid Injection   WHAT YOU NEED TO KNOW:   An epidural steroid injection (JAMEL) is a procedure to inject steroid medicine into the epidural space. The epidural space is between your spinal cord and vertebrae. Steroids reduce inflammation and fluid buildup in your spine that may be causing pain. You may be given pain medicine along with the steroids. ACTIVITY  Do not drive or operate machinery today. No strenuous activity today - bending, lifting, etc.  You may resume normal activites starting tomorrow - start slowly and as tolerated. You may shower today, but no tub baths or hot tubs. You may have numbness for several hours from the local anesthetic. Please use caution and common sense, especially with weight-bearing activities. CARE OF THE INJECTION SITE  If you have soreness or pain, apply ice to the area today (20 minutes on/20 minutes off). Starting tomorrow, you may use warm, moist heat or ice if needed. You may have an increase or change in your discomfort for 36-48 hours after your treatment. Apply ice and continue with any pain medication you have been prescribed. Notify the [facility] if you have any of the following: redness, drainage, swelling, headache, stiff neck or fever above 100°F.    SPECIAL INSTRUCTIONS  Our office will contact you in approximately 7 days for a progress report. MEDICATIONS  Continue to take all routine medications. Our office may have instructed you to hold some medications. As no general anesthesia was used in today's procedure, you should not experience any side effects related to anesthesia. If you are diabetic, the steroids used in today's injection may temporarily increase your blood sugar levels after the first few days after your injection. Please keep a close eye on your sugars and alert the doctor who manages your diabetes if your sugars are significantly high from your baseline or you are symptomatic.      If you have a problem specifically related to your procedure, please call our office at [phone]. Problems not related to your procedure should be directed to your primary care physician.

## 2023-11-09 ENCOUNTER — TELEPHONE (OUTPATIENT)
Dept: RADIOLOGY | Facility: MEDICAL CENTER | Age: 58
End: 2023-11-09

## 2024-02-21 PROBLEM — Z01.419 GYNECOLOGIC EXAM NORMAL: Status: RESOLVED | Noted: 2019-10-23 | Resolved: 2024-02-21

## 2024-03-29 DIAGNOSIS — M48.062 LUMBAR STENOSIS WITH NEUROGENIC CLAUDICATION: ICD-10-CM

## 2024-04-01 RX ORDER — GABAPENTIN 300 MG/1
300 CAPSULE ORAL
Qty: 30 CAPSULE | Refills: 0 | Status: SHIPPED | OUTPATIENT
Start: 2024-04-01

## 2024-04-27 DIAGNOSIS — M48.062 LUMBAR STENOSIS WITH NEUROGENIC CLAUDICATION: ICD-10-CM

## 2024-04-29 RX ORDER — GABAPENTIN 300 MG/1
300 CAPSULE ORAL
Qty: 30 CAPSULE | Refills: 5 | Status: SHIPPED | OUTPATIENT
Start: 2024-04-29

## 2024-07-10 ENCOUNTER — HOSPITAL ENCOUNTER (INPATIENT)
Facility: HOSPITAL | Age: 59
LOS: 7 days | Discharge: HOME | DRG: 326 | End: 2024-07-17
Attending: EMERGENCY MEDICINE | Admitting: SURGERY
Payer: COMMERCIAL

## 2024-07-10 ENCOUNTER — APPOINTMENT (INPATIENT)
Dept: RADIOLOGY | Facility: HOSPITAL | Age: 59
DRG: 326 | End: 2024-07-10
Payer: COMMERCIAL

## 2024-07-10 ENCOUNTER — ANESTHESIA (INPATIENT)
Dept: PERIOP | Facility: HOSPITAL | Age: 59
DRG: 326 | End: 2024-07-10
Payer: COMMERCIAL

## 2024-07-10 ENCOUNTER — ANESTHESIA EVENT (INPATIENT)
Dept: PERIOP | Facility: HOSPITAL | Age: 59
DRG: 326 | End: 2024-07-10
Payer: COMMERCIAL

## 2024-07-10 ENCOUNTER — APPOINTMENT (EMERGENCY)
Dept: CT IMAGING | Facility: HOSPITAL | Age: 59
DRG: 326 | End: 2024-07-10
Payer: COMMERCIAL

## 2024-07-10 DIAGNOSIS — K27.5 PERFORATED ULCER (HCC): ICD-10-CM

## 2024-07-10 DIAGNOSIS — R10.9 ABDOMINAL PAIN: ICD-10-CM

## 2024-07-10 DIAGNOSIS — K31.89 GASTRIC MASS: ICD-10-CM

## 2024-07-10 DIAGNOSIS — K25.1 ACUTE GASTRIC ULCER WITH PERFORATION (HCC): Primary | ICD-10-CM

## 2024-07-10 LAB
ABO GROUP BLD: NORMAL
ABO GROUP BLD: NORMAL
ALBUMIN SERPL BCG-MCNC: 4.2 G/DL (ref 3.5–5)
ALP SERPL-CCNC: 55 U/L (ref 34–104)
ALT SERPL W P-5'-P-CCNC: 26 U/L (ref 7–52)
ANION GAP SERPL CALCULATED.3IONS-SCNC: 6 MMOL/L (ref 4–13)
APTT PPP: 28 SECONDS (ref 23–37)
AST SERPL W P-5'-P-CCNC: 21 U/L (ref 13–39)
BASOPHILS # BLD AUTO: 0.1 THOUSANDS/ÂΜL (ref 0–0.1)
BASOPHILS NFR BLD AUTO: 2 % (ref 0–1)
BILIRUB SERPL-MCNC: 0.82 MG/DL (ref 0.2–1)
BLD GP AB SCN SERPL QL: NEGATIVE
BUN SERPL-MCNC: 18 MG/DL (ref 5–25)
CALCIUM SERPL-MCNC: 9 MG/DL (ref 8.4–10.2)
CHLORIDE SERPL-SCNC: 105 MMOL/L (ref 96–108)
CO2 SERPL-SCNC: 28 MMOL/L (ref 21–32)
CREAT SERPL-MCNC: 0.7 MG/DL (ref 0.6–1.3)
EOSINOPHIL # BLD AUTO: 0.34 THOUSAND/ÂΜL (ref 0–0.61)
EOSINOPHIL NFR BLD AUTO: 6 % (ref 0–6)
ERYTHROCYTE [DISTWIDTH] IN BLOOD BY AUTOMATED COUNT: 12.6 % (ref 11.6–15.1)
GFR SERPL CREATININE-BSD FRML MDRD: 94 ML/MIN/1.73SQ M
GLUCOSE SERPL-MCNC: 140 MG/DL (ref 65–140)
HCT VFR BLD AUTO: 47 % (ref 34.8–46.1)
HGB BLD-MCNC: 15.6 G/DL (ref 11.5–15.4)
IMM GRANULOCYTES # BLD AUTO: 0.02 THOUSAND/UL (ref 0–0.2)
IMM GRANULOCYTES NFR BLD AUTO: 0 % (ref 0–2)
INR PPP: 0.91 (ref 0.84–1.19)
LACTATE SERPL-SCNC: 1.5 MMOL/L (ref 0.5–2)
LIPASE SERPL-CCNC: 53 U/L (ref 11–82)
LYMPHOCYTES # BLD AUTO: 1.85 THOUSANDS/ÂΜL (ref 0.6–4.47)
LYMPHOCYTES NFR BLD AUTO: 30 % (ref 14–44)
MCH RBC QN AUTO: 30.4 PG (ref 26.8–34.3)
MCHC RBC AUTO-ENTMCNC: 33.2 G/DL (ref 31.4–37.4)
MCV RBC AUTO: 91 FL (ref 82–98)
MONOCYTES # BLD AUTO: 0.4 THOUSAND/ÂΜL (ref 0.17–1.22)
MONOCYTES NFR BLD AUTO: 7 % (ref 4–12)
NEUTROPHILS # BLD AUTO: 3.43 THOUSANDS/ÂΜL (ref 1.85–7.62)
NEUTS SEG NFR BLD AUTO: 55 % (ref 43–75)
NRBC BLD AUTO-RTO: 0 /100 WBCS
PLATELET # BLD AUTO: 253 THOUSANDS/UL (ref 149–390)
PMV BLD AUTO: 10.1 FL (ref 8.9–12.7)
POTASSIUM SERPL-SCNC: 3.8 MMOL/L (ref 3.5–5.3)
PROT SERPL-MCNC: 6.7 G/DL (ref 6.4–8.4)
PROTHROMBIN TIME: 12.9 SECONDS (ref 11.6–14.5)
RBC # BLD AUTO: 5.14 MILLION/UL (ref 3.81–5.12)
RH BLD: POSITIVE
RH BLD: POSITIVE
SODIUM SERPL-SCNC: 139 MMOL/L (ref 135–147)
SPECIMEN EXPIRATION DATE: NORMAL
WBC # BLD AUTO: 6.14 THOUSAND/UL (ref 4.31–10.16)

## 2024-07-10 PROCEDURE — 86850 RBC ANTIBODY SCREEN: CPT

## 2024-07-10 PROCEDURE — 74177 CT ABD & PELVIS W/CONTRAST: CPT

## 2024-07-10 PROCEDURE — 0DU707Z SUPPLEMENT STOMACH, PYLORUS WITH AUTOLOGOUS TISSUE SUBSTITUTE, OPEN APPROACH: ICD-10-PCS | Performed by: SURGERY

## 2024-07-10 PROCEDURE — 87040 BLOOD CULTURE FOR BACTERIA: CPT

## 2024-07-10 PROCEDURE — 88342 IMHCHEM/IMCYTCHM 1ST ANTB: CPT | Performed by: PATHOLOGY

## 2024-07-10 PROCEDURE — 88313 SPECIAL STAINS GROUP 2: CPT | Performed by: PATHOLOGY

## 2024-07-10 PROCEDURE — 88305 TISSUE EXAM BY PATHOLOGIST: CPT | Performed by: PATHOLOGY

## 2024-07-10 PROCEDURE — 99284 EMERGENCY DEPT VISIT MOD MDM: CPT

## 2024-07-10 PROCEDURE — 80053 COMPREHEN METABOLIC PANEL: CPT | Performed by: EMERGENCY MEDICINE

## 2024-07-10 PROCEDURE — 85610 PROTHROMBIN TIME: CPT

## 2024-07-10 PROCEDURE — 43840 GSTRRPHY SUTR DUOL/GSTR ULCR: CPT | Performed by: SURGERY

## 2024-07-10 PROCEDURE — 83690 ASSAY OF LIPASE: CPT | Performed by: EMERGENCY MEDICINE

## 2024-07-10 PROCEDURE — 85730 THROMBOPLASTIN TIME PARTIAL: CPT

## 2024-07-10 PROCEDURE — 36415 COLL VENOUS BLD VENIPUNCTURE: CPT

## 2024-07-10 PROCEDURE — 99291 CRITICAL CARE FIRST HOUR: CPT | Performed by: EMERGENCY MEDICINE

## 2024-07-10 PROCEDURE — 71045 X-RAY EXAM CHEST 1 VIEW: CPT

## 2024-07-10 PROCEDURE — 86900 BLOOD TYPING SEROLOGIC ABO: CPT

## 2024-07-10 PROCEDURE — 88341 IMHCHEM/IMCYTCHM EA ADD ANTB: CPT | Performed by: PATHOLOGY

## 2024-07-10 PROCEDURE — 86901 BLOOD TYPING SEROLOGIC RH(D): CPT

## 2024-07-10 PROCEDURE — 85025 COMPLETE CBC W/AUTO DIFF WBC: CPT | Performed by: EMERGENCY MEDICINE

## 2024-07-10 PROCEDURE — C9290 INJ, BUPIVACAINE LIPOSOME: HCPCS | Performed by: ANESTHESIOLOGY

## 2024-07-10 PROCEDURE — 96375 TX/PRO/DX INJ NEW DRUG ADDON: CPT

## 2024-07-10 PROCEDURE — 83605 ASSAY OF LACTIC ACID: CPT

## 2024-07-10 PROCEDURE — 99255 IP/OBS CONSLTJ NEW/EST HI 80: CPT | Performed by: INTERNAL MEDICINE

## 2024-07-10 PROCEDURE — 96374 THER/PROPH/DIAG INJ IV PUSH: CPT

## 2024-07-10 PROCEDURE — 99223 1ST HOSP IP/OBS HIGH 75: CPT | Performed by: SURGERY

## 2024-07-10 PROCEDURE — 96361 HYDRATE IV INFUSION ADD-ON: CPT

## 2024-07-10 RX ORDER — ACETAMINOPHEN 325 MG/1
650 TABLET ORAL EVERY 6 HOURS PRN
Status: DISCONTINUED | OUTPATIENT
Start: 2024-07-10 | End: 2024-07-10

## 2024-07-10 RX ORDER — METRONIDAZOLE 500 MG/100ML
500 INJECTION, SOLUTION INTRAVENOUS EVERY 8 HOURS
Status: COMPLETED | OUTPATIENT
Start: 2024-07-10 | End: 2024-07-15

## 2024-07-10 RX ORDER — SODIUM CHLORIDE, SODIUM LACTATE, POTASSIUM CHLORIDE, CALCIUM CHLORIDE 600; 310; 30; 20 MG/100ML; MG/100ML; MG/100ML; MG/100ML
100 INJECTION, SOLUTION INTRAVENOUS CONTINUOUS
Status: DISCONTINUED | OUTPATIENT
Start: 2024-07-10 | End: 2024-07-13

## 2024-07-10 RX ORDER — MORPHINE SULFATE 4 MG/ML
4 INJECTION, SOLUTION INTRAMUSCULAR; INTRAVENOUS ONCE
Status: COMPLETED | OUTPATIENT
Start: 2024-07-10 | End: 2024-07-10

## 2024-07-10 RX ORDER — ONDANSETRON 2 MG/ML
4 INJECTION INTRAMUSCULAR; INTRAVENOUS EVERY 4 HOURS PRN
Status: DISCONTINUED | OUTPATIENT
Start: 2024-07-10 | End: 2024-07-17 | Stop reason: HOSPADM

## 2024-07-10 RX ORDER — ROCURONIUM BROMIDE 10 MG/ML
INJECTION, SOLUTION INTRAVENOUS AS NEEDED
Status: DISCONTINUED | OUTPATIENT
Start: 2024-07-10 | End: 2024-07-10

## 2024-07-10 RX ORDER — CALCIUM CARB/VITAMIN D3/VIT K1 500-500-40
15 TABLET,CHEWABLE ORAL DAILY
Status: DISCONTINUED | OUTPATIENT
Start: 2024-07-10 | End: 2024-07-17 | Stop reason: HOSPADM

## 2024-07-10 RX ORDER — SODIUM CHLORIDE 9 MG/ML
INJECTION, SOLUTION INTRAVENOUS CONTINUOUS PRN
Status: DISCONTINUED | OUTPATIENT
Start: 2024-07-10 | End: 2024-07-10

## 2024-07-10 RX ORDER — ACETAMINOPHEN 10 MG/ML
1000 INJECTION, SOLUTION INTRAVENOUS EVERY 6 HOURS PRN
Status: DISCONTINUED | OUTPATIENT
Start: 2024-07-10 | End: 2024-07-11

## 2024-07-10 RX ORDER — HYDROMORPHONE HCL/PF 1 MG/ML
0.2 SYRINGE (ML) INJECTION
Status: DISCONTINUED | OUTPATIENT
Start: 2024-07-10 | End: 2024-07-15

## 2024-07-10 RX ORDER — METOCLOPRAMIDE HYDROCHLORIDE 5 MG/ML
10 INJECTION INTRAMUSCULAR; INTRAVENOUS ONCE AS NEEDED
Status: DISCONTINUED | OUTPATIENT
Start: 2024-07-10 | End: 2024-07-10 | Stop reason: HOSPADM

## 2024-07-10 RX ORDER — SUCCINYLCHOLINE/SOD CL,ISO/PF 100 MG/5ML
SYRINGE (ML) INTRAVENOUS AS NEEDED
Status: DISCONTINUED | OUTPATIENT
Start: 2024-07-10 | End: 2024-07-10

## 2024-07-10 RX ORDER — MIDAZOLAM HYDROCHLORIDE 2 MG/2ML
INJECTION, SOLUTION INTRAMUSCULAR; INTRAVENOUS AS NEEDED
Status: DISCONTINUED | OUTPATIENT
Start: 2024-07-10 | End: 2024-07-10

## 2024-07-10 RX ORDER — LIDOCAINE HYDROCHLORIDE 10 MG/ML
INJECTION, SOLUTION EPIDURAL; INFILTRATION; INTRACAUDAL; PERINEURAL AS NEEDED
Status: DISCONTINUED | OUTPATIENT
Start: 2024-07-10 | End: 2024-07-10

## 2024-07-10 RX ORDER — CEFAZOLIN SODIUM 2 G/50ML
2000 SOLUTION INTRAVENOUS
Status: DISCONTINUED | OUTPATIENT
Start: 2024-07-10 | End: 2024-07-10

## 2024-07-10 RX ORDER — FENTANYL CITRATE/PF 50 MCG/ML
50 SYRINGE (ML) INJECTION
Status: DISCONTINUED | OUTPATIENT
Start: 2024-07-10 | End: 2024-07-10 | Stop reason: HOSPADM

## 2024-07-10 RX ORDER — HYDROMORPHONE HCL/PF 1 MG/ML
0.5 SYRINGE (ML) INJECTION
Status: DISCONTINUED | OUTPATIENT
Start: 2024-07-10 | End: 2024-07-10 | Stop reason: HOSPADM

## 2024-07-10 RX ORDER — GABAPENTIN 300 MG/1
300 CAPSULE ORAL
Status: DISCONTINUED | OUTPATIENT
Start: 2024-07-10 | End: 2024-07-10

## 2024-07-10 RX ORDER — ONDANSETRON 2 MG/ML
4 INJECTION INTRAMUSCULAR; INTRAVENOUS ONCE AS NEEDED
Status: COMPLETED | OUTPATIENT
Start: 2024-07-10 | End: 2024-07-10

## 2024-07-10 RX ORDER — PANTOPRAZOLE SODIUM 40 MG/10ML
40 INJECTION, POWDER, LYOPHILIZED, FOR SOLUTION INTRAVENOUS EVERY 12 HOURS SCHEDULED
Status: DISCONTINUED | OUTPATIENT
Start: 2024-07-10 | End: 2024-07-17 | Stop reason: HOSPADM

## 2024-07-10 RX ORDER — ATORVASTATIN CALCIUM 20 MG/1
20 TABLET, FILM COATED ORAL
Status: DISCONTINUED | OUTPATIENT
Start: 2024-07-10 | End: 2024-07-10

## 2024-07-10 RX ORDER — HYDROMORPHONE HYDROCHLORIDE 2 MG/ML
INJECTION, SOLUTION INTRAMUSCULAR; INTRAVENOUS; SUBCUTANEOUS AS NEEDED
Status: DISCONTINUED | OUTPATIENT
Start: 2024-07-10 | End: 2024-07-10

## 2024-07-10 RX ORDER — FENTANYL CITRATE 50 UG/ML
INJECTION, SOLUTION INTRAMUSCULAR; INTRAVENOUS AS NEEDED
Status: DISCONTINUED | OUTPATIENT
Start: 2024-07-10 | End: 2024-07-10

## 2024-07-10 RX ORDER — PROPOFOL 10 MG/ML
INJECTION, EMULSION INTRAVENOUS AS NEEDED
Status: DISCONTINUED | OUTPATIENT
Start: 2024-07-10 | End: 2024-07-10

## 2024-07-10 RX ORDER — ONDANSETRON 2 MG/ML
4 INJECTION INTRAMUSCULAR; INTRAVENOUS EVERY 6 HOURS PRN
Status: DISCONTINUED | OUTPATIENT
Start: 2024-07-10 | End: 2024-07-10

## 2024-07-10 RX ORDER — FLUCONAZOLE 2 MG/ML
400 INJECTION, SOLUTION INTRAVENOUS EVERY 24 HOURS
Status: DISCONTINUED | OUTPATIENT
Start: 2024-07-10 | End: 2024-07-17

## 2024-07-10 RX ORDER — CEFAZOLIN SODIUM 2 G/50ML
SOLUTION INTRAVENOUS
Status: COMPLETED
Start: 2024-07-10 | End: 2024-07-10

## 2024-07-10 RX ORDER — OXYCODONE HYDROCHLORIDE 5 MG/1
10 TABLET ORAL EVERY 4 HOURS PRN
Status: DISCONTINUED | OUTPATIENT
Start: 2024-07-10 | End: 2024-07-10

## 2024-07-10 RX ORDER — ONDANSETRON 2 MG/ML
INJECTION INTRAMUSCULAR; INTRAVENOUS AS NEEDED
Status: DISCONTINUED | OUTPATIENT
Start: 2024-07-10 | End: 2024-07-10

## 2024-07-10 RX ORDER — ONDANSETRON 2 MG/ML
4 INJECTION INTRAMUSCULAR; INTRAVENOUS ONCE AS NEEDED
Status: DISCONTINUED | OUTPATIENT
Start: 2024-07-10 | End: 2024-07-10 | Stop reason: HOSPADM

## 2024-07-10 RX ORDER — DEXAMETHASONE SODIUM PHOSPHATE 10 MG/ML
INJECTION, SOLUTION INTRAMUSCULAR; INTRAVENOUS AS NEEDED
Status: DISCONTINUED | OUTPATIENT
Start: 2024-07-10 | End: 2024-07-10

## 2024-07-10 RX ORDER — ALBUMIN, HUMAN INJ 5% 5 %
SOLUTION INTRAVENOUS CONTINUOUS PRN
Status: DISCONTINUED | OUTPATIENT
Start: 2024-07-10 | End: 2024-07-10

## 2024-07-10 RX ORDER — BUPIVACAINE HYDROCHLORIDE 2.5 MG/ML
INJECTION, SOLUTION EPIDURAL; INFILTRATION; INTRACAUDAL
Status: DISCONTINUED | OUTPATIENT
Start: 2024-07-10 | End: 2024-07-10

## 2024-07-10 RX ORDER — MAGNESIUM HYDROXIDE 1200 MG/15ML
LIQUID ORAL AS NEEDED
Status: DISCONTINUED | OUTPATIENT
Start: 2024-07-10 | End: 2024-07-10 | Stop reason: HOSPADM

## 2024-07-10 RX ORDER — KETOROLAC TROMETHAMINE 30 MG/ML
15 INJECTION, SOLUTION INTRAMUSCULAR; INTRAVENOUS ONCE
Status: COMPLETED | OUTPATIENT
Start: 2024-07-10 | End: 2024-07-10

## 2024-07-10 RX ORDER — OXYCODONE HYDROCHLORIDE 5 MG/1
5 TABLET ORAL EVERY 4 HOURS PRN
Status: DISCONTINUED | OUTPATIENT
Start: 2024-07-10 | End: 2024-07-10

## 2024-07-10 RX ADMIN — FLUCONAZOLE 400 MG: 2 INJECTION, SOLUTION INTRAVENOUS at 15:29

## 2024-07-10 RX ADMIN — SODIUM CHLORIDE: 0.9 INJECTION, SOLUTION INTRAVENOUS at 08:35

## 2024-07-10 RX ADMIN — PANTOPRAZOLE SODIUM 40 MG: 40 INJECTION, POWDER, FOR SOLUTION INTRAVENOUS at 20:05

## 2024-07-10 RX ADMIN — ROCURONIUM BROMIDE 10 MG: 10 INJECTION, SOLUTION INTRAVENOUS at 09:08

## 2024-07-10 RX ADMIN — HYDROMORPHONE HYDROCHLORIDE 0.5 MG: 1 INJECTION, SOLUTION INTRAMUSCULAR; INTRAVENOUS; SUBCUTANEOUS at 11:06

## 2024-07-10 RX ADMIN — LIDOCAINE HYDROCHLORIDE 50 MG: 10 INJECTION, SOLUTION EPIDURAL; INFILTRATION; INTRACAUDAL at 08:34

## 2024-07-10 RX ADMIN — SODIUM CHLORIDE, SODIUM LACTATE, POTASSIUM CHLORIDE, AND CALCIUM CHLORIDE: .6; .31; .03; .02 INJECTION, SOLUTION INTRAVENOUS at 08:32

## 2024-07-10 RX ADMIN — SODIUM CHLORIDE, SODIUM LACTATE, POTASSIUM CHLORIDE, AND CALCIUM CHLORIDE 1000 ML: .6; .31; .03; .02 INJECTION, SOLUTION INTRAVENOUS at 07:50

## 2024-07-10 RX ADMIN — MIDAZOLAM 2 MG: 1 INJECTION INTRAMUSCULAR; INTRAVENOUS at 08:32

## 2024-07-10 RX ADMIN — FENTANYL CITRATE 50 MCG: 50 INJECTION INTRAMUSCULAR; INTRAVENOUS at 08:51

## 2024-07-10 RX ADMIN — Medication 200 MG: at 08:34

## 2024-07-10 RX ADMIN — Medication: at 11:16

## 2024-07-10 RX ADMIN — SODIUM CHLORIDE 40 MG: 9 INJECTION, SOLUTION INTRAVENOUS at 08:48

## 2024-07-10 RX ADMIN — CEFTRIAXONE SODIUM 1000 MG: 10 INJECTION, POWDER, FOR SOLUTION INTRAVENOUS at 15:27

## 2024-07-10 RX ADMIN — ONDANSETRON 4 MG: 2 INJECTION INTRAMUSCULAR; INTRAVENOUS at 09:00

## 2024-07-10 RX ADMIN — CEFAZOLIN SODIUM 2000 MG: 2 SOLUTION INTRAVENOUS at 08:41

## 2024-07-10 RX ADMIN — SODIUM CHLORIDE 1000 ML: 0.9 INJECTION, SOLUTION INTRAVENOUS at 06:58

## 2024-07-10 RX ADMIN — PIPERACILLIN SODIUM AND TAZOBACTAM SODIUM 4.5 G: 36; 4.5 INJECTION, POWDER, LYOPHILIZED, FOR SOLUTION INTRAVENOUS at 07:58

## 2024-07-10 RX ADMIN — BUPIVACAINE HYDROCHLORIDE 20 ML: 2.5 INJECTION, SOLUTION EPIDURAL; INFILTRATION; INTRACAUDAL; PERINEURAL at 10:16

## 2024-07-10 RX ADMIN — ALBUMIN (HUMAN): 12.5 INJECTION, SOLUTION INTRAVENOUS at 09:31

## 2024-07-10 RX ADMIN — DEXAMETHASONE SODIUM PHOSPHATE 10 MG: 10 INJECTION, SOLUTION INTRAMUSCULAR; INTRAVENOUS at 09:00

## 2024-07-10 RX ADMIN — HYDROMORPHONE HYDROCHLORIDE 0.5 MG: 2 INJECTION INTRAMUSCULAR; INTRAVENOUS; SUBCUTANEOUS at 09:09

## 2024-07-10 RX ADMIN — MORPHINE SULFATE 4 MG: 4 INJECTION INTRAVENOUS at 06:58

## 2024-07-10 RX ADMIN — FENTANYL CITRATE 100 MCG: 50 INJECTION INTRAMUSCULAR; INTRAVENOUS at 08:34

## 2024-07-10 RX ADMIN — ROCURONIUM BROMIDE 50 MG: 10 INJECTION, SOLUTION INTRAVENOUS at 08:45

## 2024-07-10 RX ADMIN — ACETAMINOPHEN 1000 MG: 10 INJECTION INTRAVENOUS at 23:17

## 2024-07-10 RX ADMIN — PROPOFOL 50 MG: 10 INJECTION, EMULSION INTRAVENOUS at 10:14

## 2024-07-10 RX ADMIN — ALBUMIN (HUMAN): 12.5 INJECTION, SOLUTION INTRAVENOUS at 08:48

## 2024-07-10 RX ADMIN — FENTANYL CITRATE 50 MCG: 50 INJECTION INTRAMUSCULAR; INTRAVENOUS at 08:57

## 2024-07-10 RX ADMIN — PHENYLEPHRINE HYDROCHLORIDE 40 MCG/MIN: 50 INJECTION INTRAVENOUS at 08:37

## 2024-07-10 RX ADMIN — KETOROLAC TROMETHAMINE 15 MG: 30 INJECTION, SOLUTION INTRAMUSCULAR; INTRAVENOUS at 06:06

## 2024-07-10 RX ADMIN — BUPIVACAINE 20 ML: 13.3 INJECTION, SUSPENSION, LIPOSOMAL INFILTRATION at 10:16

## 2024-07-10 RX ADMIN — SUGAMMADEX 200 MG: 100 INJECTION, SOLUTION INTRAVENOUS at 10:16

## 2024-07-10 RX ADMIN — ONDANSETRON 4 MG: 2 INJECTION INTRAMUSCULAR; INTRAVENOUS at 06:20

## 2024-07-10 RX ADMIN — PROPOFOL 150 MG: 10 INJECTION, EMULSION INTRAVENOUS at 08:34

## 2024-07-10 RX ADMIN — METRONIDAZOLE 500 MG: 500 INJECTION, SOLUTION INTRAVENOUS at 20:05

## 2024-07-10 RX ADMIN — METRONIDAZOLE 500 MG: 500 INJECTION, SOLUTION INTRAVENOUS at 15:46

## 2024-07-10 RX ADMIN — IOHEXOL 100 ML: 350 INJECTION, SOLUTION INTRAVENOUS at 06:47

## 2024-07-10 NOTE — Clinical Note
Case was discussed with Surgery and the patient's admission status was agreed to be Admission Status: inpatient status to the service of Dr. Aaron  .PENDING

## 2024-07-10 NOTE — ANESTHESIA POSTPROCEDURE EVALUATION
Post-Op Assessment Note    CV Status:  Stable  Pain Score: 0    Pain management: adequate       Mental Status:  Sleepy and arousable   Hydration Status:  Euvolemic   PONV Controlled:  Controlled   Airway Patency:  Patent     Post Op Vitals Reviewed: Yes    No anethesia notable event occurred.    Staff: PAOLA           BP   116/59   Temp   98.3   Pulse  94   Resp   15   SpO2   99% 6L

## 2024-07-10 NOTE — ED ATTENDING ATTESTATION
7/10/2024  I, Isiah Calderon MD, saw and evaluated the patient. I have discussed the patient with the resident/non-physician practitioner and agree with the resident's/non-physician practitioner's findings, Plan of Care, and MDM as documented in the resident's/non-physician practitioner's note, except where noted. All available labs and Radiology studies were reviewed.  I was present for key portions of any procedure(s) performed by the resident/non-physician practitioner and I was immediately available to provide assistance.       At this point I agree with the current assessment done in the Emergency Department.  I have conducted an independent evaluation of this patient a history and physical is as follows:    58-year-old female, presents with abdominal pain.  Reports abdominal pain, sudden onset prior to arrival.  On exam, patient appears uncomfortable, tenderness across upper abdomen.    ED Course     CT scan and labs ordered.  CT scan independently reviewed by myself, shows free air.  Radiology read as likely perforated distal gastric ulcer.  IV antibiotics given, surgery consulted.    Critical Care Time  CriticalCare Time    Date/Time: 7/10/2024 6:54 AM    Performed by: Isiah Calderon MD  Authorized by: Isiah Calderon MD    Critical care provider statement:     Critical care time (minutes):  40    Critical care time was exclusive of:  Separately billable procedures and treating other patients and teaching time    Critical care was necessary to treat or prevent imminent or life-threatening deterioration of the following conditions: Peritonitis, perforated stomach.    Critical care was time spent personally by me on the following activities:  Obtaining history from patient or surrogate, development of treatment plan with patient or surrogate, discussions with consultants, evaluation of patient's response to treatment, examination of patient, ordering and performing treatments and interventions, ordering  and review of laboratory studies, ordering and review of radiographic studies and re-evaluation of patient's condition

## 2024-07-10 NOTE — H&P
Acute Care Surgery  History and Physical  Nidhi Jeffries 58 y.o. female MRN: [id number]  Unit/Bed#: ED-23 Encounter: [id number]    Assessment:  58-year-old female with sudden abdominal pain. Hx of rheumatoid arthritis, GERD, HTN, spinal stenosis, prior .  Takes meloxicam and aspirin daily.  No prior EGD.  Hypotensive with EMS, responsive to IV fluids.  Currently vital signs stable on room air.  CT AP shows perforated distal gastric antral/pyloric ulcer.       Plan:  -OR for Ex-lap Amor patch repair, possible antrectomy, possible bowel resection  -NPO  -IV fluids  -Zosyn  -Protonix  -NGT  -Lactate  -Blood cultures  -Stepdown level 2      History of Present Illness   HPI:  Nidhi Jeffries is a 58 y.o. female who presents with sudden abdominal pain.  At 4:15 AM patient woke up out of bed with worsening right upper quadrant abdominal pain.  Patient reports the pain is constant.  Patient is never experienced pain like this before.  Patient experienced nausea but no vomiting.  No fevers chills or chest pain.  Patient reports shortness of breath with the pain.  Patient feels like she is trembling.  Last bowel movement earlier today nonbloody. Patient takes meloxicam and aspirin.  History of GERD, rheumatoid arthritis, HTN, spinal stenosis and prior .  Patient has allergies to sulfa medications.    Review of Systems   Constitutional:  Negative for chills and fever.   Respiratory:  Positive for shortness of breath.    Cardiovascular:  Negative for chest pain.   Gastrointestinal:  Positive for abdominal distention, abdominal pain and nausea.       Historical Information   Past Medical History:   Diagnosis Date    Hypertension     Rheumatoid arthritis (HCC)      Past Surgical History:   Procedure Laterality Date     SECTION       Social History   Social History     Substance and Sexual Activity   Alcohol Use Not Currently     Social History     Substance and Sexual Activity   Drug Use No     Social History  "    Tobacco Use   Smoking Status Former   Smokeless Tobacco Never     Family History: non-contributory    Meds/Allergies   all medications and allergies reviewed  Allergies   Allergen Reactions    Sulfa Antibiotics        Objective   First Vitals:   Blood Pressure: 116/65 (07/10/24 0535)  Pulse: 67 (07/10/24 0535)  Temperature: 97.8 °F (36.6 °C) (07/10/24 0535)  Temp Source: Oral (07/10/24 0535)  Respirations: 18 (07/10/24 0535)  Height: 5' 2\" (157.5 cm) (07/10/24 0535)  Weight - Scale: 90.1 kg (198 lb 10.2 oz) (07/10/24 0700)  SpO2: 98 % (07/10/24 0535)    Current Vitals:   Blood Pressure: 110/62 (07/10/24 0730)  Pulse: 87 (07/10/24 0730)  Temperature: 97.8 °F (36.6 °C) (07/10/24 0535)  Temp Source: Oral (07/10/24 0535)  Respirations: 18 (07/10/24 0730)  Height: 5' 2\" (157.5 cm) (07/10/24 0535)  Weight - Scale: 90.1 kg (198 lb 10.2 oz) (07/10/24 0700)  SpO2: 99 % (07/10/24 0730)    No intake or output data in the 24 hours ending 07/10/24 0802    Invasive Devices       Peripheral Intravenous Line  Duration             Peripheral IV 07/10/24 Dorsal (posterior);Left Hand <1 day    Peripheral IV 07/10/24 Right Antecubital <1 day                    Physical Exam:  General: No acute distress  Neuro: Awake, Alert and Oriented x 3  HEENT:  Normocephalic, atraumatic, moist mucous membranes  CV: Regular rate and rhythm  Lungs: Normal work of breathing, no increased respiratory effort  Abdomen: Soft, diffusely tender, signs of peritonitis, rebound tenderness and guarding, distended  Extremities: No edema, clubbing or cyanosis  Skin: Warm, dry    Lab Results: I have personally reviewed pertinent lab results.  , CBC:   Lab Results   Component Value Date    WBC 6.14 07/10/2024    HGB 15.6 (H) 07/10/2024    HCT 47.0 (H) 07/10/2024    MCV 91 07/10/2024     07/10/2024    RBC 5.14 (H) 07/10/2024    MCH 30.4 07/10/2024    MCHC 33.2 07/10/2024    RDW 12.6 07/10/2024    MPV 10.1 07/10/2024    NRBC 0 07/10/2024   , CMP:   Lab " Results   Component Value Date    SODIUM 139 07/10/2024    K 3.8 07/10/2024     07/10/2024    CO2 28 07/10/2024    BUN 18 07/10/2024    CREATININE 0.70 07/10/2024    CALCIUM 9.0 07/10/2024    AST 21 07/10/2024    ALT 26 07/10/2024    ALKPHOS 55 07/10/2024    EGFR 94 07/10/2024     Imaging: I have personally reviewed pertinent reports.    EKG, Pathology, and Other Studies: I have personally reviewed pertinent reports.      Code Status: Level 1 - Full Code  Advance Directive and Living Will:      Power of :    POLST:      Counseling / Coordination of Care  Total floor / unit time spent today 25 minutes. This involved direct patient contact where I performed a full history and physical, reviewed previous records, and reviewed laboratory and other diagnostic studies. Greater than 50% of total time was spent with the patient and / or family counseling and / or coordination of care.    Aramis Hoffman MD  General Surgery PGY-1  7/10/2024

## 2024-07-10 NOTE — CONSULTS
Consultation -  [facility]  Nidhi Jeffries 58 y.o. female MRN: [id number]  Unit/Bed#: OR POOL Encounter: [id number]        Inpatient consult to gastroenterology  Consult performed by: Dottie Chester PA-C  Consult ordered by: Chris Mak MD          Reason for Consult / Principal Problem: gastric mass, perforated gastric ulcer     ASSESSMENT and PLAN:    Active Problems:  There are no active Hospital Problems.    #1. Perforated gastric ulcer: with sudden onset symptoms at 4 am, likely meloxicam use related, rule out H pylori with bx during procedure, s/p leah patch this AM  -avoid NSAIDs  -PPI BID  -management per surgical team    #2. Gastric mass: noted on imaging and during surgery. CT scan with 3 x 4.5 x 3.2 cm circumscribed mass like opacity on the lesser curvature which could be hemorrhage or soft tissue, rule out GIST tumor  -per discussion with surgery, would not recommend EGD or EUS for at least 8 weeks  -would plan outpatient EGD/EUS, likely with advanced endo at [place].   -------------------------------------------------------------------------------------------------------------------    HPI: This is a 58-year-old female with a history of hypertension, hyperlipidemia, rheumatoid arthritis, spinal stenosis, GERD who presented to the hospital early this morning due to a sudden onset of sharp upper abdominal pain that began around 4:30 AM.  She reports that she had been having very mild intermittent discomfort in her abdomen recently but nothing significant.  Denied any significant vomiting, occasional nausea.  She reports taking meloxicam on a regular basis for joint pain.  Denies any black tarry stools or blood in the stool.  Denies any diarrhea or constipation.  She has never had an EGD in the past.  She had a colonoscopy in 2020 at [facility] which was unremarkable and was recommended to repeat in 10 years.  She denies any unexplained weight loss.  She denies any  early satiety or change in appetite.  CT scan was notable for a perforated gastric ulcer as well as a soft tissue density in the lesser curvature concerning for a gastric mass.  This was palpated during her surgery and was a size of a golf ball but biopsies were not taken at that time.  A sample of the gastric mucosa was taken during the surgery to rule out H. pylori.    REVIEW OF SYSTEMS:    CONSTITUTIONAL: Denies any fever, chills, or rigors. Good appetite, and no recent weight loss.  HEENT: No earache or tinnitus. Denies hearing loss or visual disturbances.  CARDIOVASCULAR: No chest pain or palpitations.   RESPIRATORY: Denies any cough, hemoptysis, shortness of breath or dyspnea on exertion.  GASTROINTESTINAL: As noted in the History of Present Illness.   GENITOURINARY: No problems with urination. Denies any hematuria or dysuria.  NEUROLOGIC: No dizziness or vertigo, denies headaches.   MUSCULOSKELETAL: Denies any muscle pain; joint pain  SKIN: Denies skin rashes or itching.   ENDOCRINE: Denies excessive thirst. Denies intolerance to heat or cold.  PSYCHOSOCIAL: Denies depression or anxiety. Denies any recent memory loss.       Historical Information   Past Medical History:   Diagnosis Date    Hypertension     Rheumatoid arthritis (HCC)      Past Surgical History:   Procedure Laterality Date     SECTION       Social History   Social History     Substance and Sexual Activity   Alcohol Use Not Currently     Social History     Substance and Sexual Activity   Drug Use No     Social History     Tobacco Use   Smoking Status Former   Smokeless Tobacco Never     Family History   Problem Relation Age of Onset    Heart disease Mother     Hypertension Mother     Diabetes Mother     No Known Problems Father     No Known Problems Maternal Grandmother     No Known Problems Maternal Grandfather     No Known Problems Paternal Grandmother     Prostate cancer Paternal Grandfather 70    No Known Problems Brother     No  Known Problems Brother     No Known Problems Brother     No Known Problems Son     Breast cancer Maternal Aunt 70    Ovarian cancer Paternal Aunt     Endometrial cancer Paternal Aunt 36    No Known Problems Family     Colon cancer Neg Hx        Meds/Allergies       Medications Prior to Admission:     Acetaminophen (TYLENOL ARTHRITIS PAIN PO)    amLODIPine-benazepril (LOTREL 5-20) 5-20 MG per capsule    aspirin 81 mg chewable tablet    atorvastatin (LIPITOR) 20 mg tablet    gabapentin (NEURONTIN) 300 mg capsule    meloxicam (MOBIC) 15 mg tablet    Multiple Vitamins-Minerals (CENTRUM SILVER PO)    spironolactone (ALDACTONE) 25 mg tablet  Current Facility-Administered Medications   Medication Dose Route Frequency    acetaminophen (Ofirmev) injection 1,000 mg  1,000 mg Intravenous Q6H PRN    ceftriaxone (ROCEPHIN) 1 g/50 mL in dextrose IVPB  1,000 mg Intravenous Q24H    fentaNYL (SUBLIMAZE) injection 50 mcg  50 mcg Intravenous Q5 Min PRN    fluconazole (DIFLUCAN) IVPB (premix) 400 mg 200 mL  400 mg Intravenous Q24H    HYDROmorphone (DILAUDID) 1 mg/mL 50 mL PCA   Intravenous Continuous    HYDROmorphone (DILAUDID) injection 0.2 mg  0.2 mg Intravenous Q3H PRN    HYDROmorphone (DILAUDID) injection 0.5 mg  0.5 mg Intravenous Q5 Min PRN    lactated ringers infusion  100 mL/hr Intravenous Continuous    metoclopramide (REGLAN) injection 10 mg  10 mg Intravenous Once PRN    metroNIDAZOLE (FLAGYL) IVPB (premix) 500 mg 100 mL  500 mg Intravenous Q8H    Multivitamin liquid 15 mL  15 mL Per NG Tube Daily    naloxone (NARCAN) 0.04 mg/mL syringe 0.04 mg  0.04 mg Intravenous Q1MIN PRN    ondansetron (ZOFRAN) injection 4 mg  4 mg Intravenous Q4H PRN    ondansetron (ZOFRAN) injection 4 mg  4 mg Intravenous Once PRN    pantoprazole (PROTONIX) injection 40 mg  40 mg Intravenous Q12H SHELIA       Allergies   Allergen Reactions    Sulfa Antibiotics            Objective     Blood pressure 118/57, pulse 90, temperature 98.3 °F (36.8 °C), resp.  "rate 14, height 5' 2\" (1.575 m), weight 89.8 kg (198 lb), last menstrual period 09/26/2020, SpO2 93%.      Intake/Output Summary (Last 24 hours) at 7/10/2024 1143  Last data filed at 7/10/2024 1019  Gross per 24 hour   Intake 2200 ml   Output 400 ml   Net 1800 ml         PHYSICAL EXAM:      General Appearance:   Alert but sleepy, cooperative, no distress, appears stated age    HEENT:   Normocephalic, atraumatic, anicteric, no oropharyngeal thrush present.     Neck:  Supple, symmetrical, trachea midline, no adenopathy;    thyroid: no enlargement/tenderness/nodules; no carotid  bruit or JVD    Lungs:   Clear to auscultation bilaterally; no rales, rhonchi or wheezing; respirations unlabored    Heart::   S1 and S2 normal; regular rate and rhythm; no murmur, rub, or gallop.   Abdomen:   Soft, appropriate post op tenderness, NGT in place, SYLVIA drain in place with bloody output, non-distended; normal bowel sounds; no masses, no organomegaly    Genitalia:   Deferred    Rectal:   Deferred    Extremities:  No cyanosis, clubbing or edema    Pulses:  2+ and symmetric all extremities    Skin:  Skin color, texture, turgor normal, no rashes or lesions    Lymph nodes:  No palpable cervical, axillary or inguinal lymphadenopathy        Lab Results:   Results from last 7 days   Lab Units 07/10/24  0542   WBC Thousand/uL 6.14   HEMOGLOBIN g/dL 15.6*   HEMATOCRIT % 47.0*   PLATELETS Thousands/uL 253   SEGS PCT % 55   LYMPHO PCT % 30   MONO PCT % 7   EOS PCT % 6     Results from last 7 days   Lab Units 07/10/24  0542   POTASSIUM mmol/L 3.8   CHLORIDE mmol/L 105   CO2 mmol/L 28   BUN mg/dL 18   CREATININE mg/dL 0.70   CALCIUM mg/dL 9.0   ALK PHOS U/L 55   ALT U/L 26   AST U/L 21     Results from last 7 days   Lab Units 07/10/24  0702   INR  0.91     Results from last 7 days   Lab Units 07/10/24  0542   LIPASE u/L 53       Imaging Studies: I have personally reviewed pertinent imaging studies.    CT abdomen pelvis with contrast    Result " Date: 7/10/2024  Impression: Findings most compatible with perforated distal gastric antral/pyloric ulcer. Masslike proximal stomach opacity. Hematoma versus submucosal mass such as leiomyoma or GIST. Workstation performed: [id number]           Patient was seen and examined by Dr. Alcaraz. All yi medical decisions were made by Dr. Alcaraz. Thank you for allowing us to participate in the care of this present patient. We will follow-up with you closely.

## 2024-07-10 NOTE — ANESTHESIA PROCEDURE NOTES
Peripheral Block    Patient location during procedure: OR  Start time: 7/10/2024 10:16 AM  Reason for block: at surgeon's request and post-op pain management  Staffing  Performed by: Christiano Ng MD  Authorized by: Christiano Ng MD    Preanesthetic Checklist  Completed: patient identified, IV checked, site marked, risks and benefits discussed, surgical consent, monitors and equipment checked, pre-op evaluation and timeout performed  Peripheral Block  Patient position: supine  Prep: ChloraPrep  Patient monitoring: continuous pulse oximetry, frequent blood pressure checks and heart rate (standard ASA mon)  Block type: Rectus Sheath  Laterality: bilateral  Injection technique: single-shot  Procedures: ultrasound guided, Ultrasound guidance required for the procedure to increase accuracy and safety of medication placement and decrease risk of complications.  Ultrasound permanent image saved  bupivacaine (PF) (MARCAINE) 0.25 % injection 20 mL - Perineural   20 mL - 7/10/2024 10:16:00 AM  Needle  Needle type: Stimuplex   Needle gauge: 20 G  Needle length: 4 in  Needle localization: ultrasound guidance  Assessment  Injection assessment: frequent aspiration, injected with ease and negative aspiration  Post-procedure:  site cleaned  patient tolerated the procedure well with no immediate complications  Additional Notes  10 cc 0.25 marcaine + 10 cc exparel both sides, with hydro-dissection with sterile NS during needle advancement - needle remained above fascia at all times, needle tip seen with any advancement

## 2024-07-10 NOTE — QUICK NOTE
"Post-Op Check    Assessment:  58F with perforated pyloric ulcer, now s/p 7/10 exlap, modified Amor patch repair.     Subjective:  Pt's pain is controlled. Denies nausea, chest pain, SOB.    Objective:  VSS on 2L NC.  /62   Pulse 96   Temp 98.2 °F (36.8 °C)   Resp 16   Ht 5' 2\" (1.575 m)   Wt 89.8 kg (198 lb)   LMP 09/26/2020 (Exact Date)   SpO2 96%   BMI 36.21 kg/m²     General: NAD  CV: nl rate  Lungs: nl wob. No resp distress.  ABD: Soft, ND, mild upper tenderness, CDI. NGT with scant bilious output, SYLVIA with ss output.  Extrem: No CCE  Rosenbaum  "

## 2024-07-10 NOTE — ANESTHESIA PREPROCEDURE EVALUATION
Procedure:  LAPAROTOMY EXPLORATORY, KYRIE PATCH REPAIR, POSSIBLE OSTOMY (Abdomen)    Relevant Problems   CARDIO   (+) Essential hypertension   (+) Hyperlipidemia      MUSCULOSKELETAL   (+) Generalized osteoarthritis      NEURO/PSYCH   (+) Paresthesia of hand, bilateral      Possible gastric perforation    Cr 0.7, hgb 15.6, plt 253       Anesthesia Plan  ASA Score- 3 Emergent    Anesthesia Type- general with ASA Monitors.         Additional Monitors: arterial line.    Airway Plan: ETT.    Comment: General anesthesia, endotracheal tube; standard ASA monitors. Risks and benefits discussed with patient; patient consented and agrees to proceed.    I saw and evaluated the patient. If seen with CRNA, we have discussed the anesthetic plan and I am in agreement that the plan is appropriate for the patient.  Consented for post-operative fascial plane block - risks discussed including infection, bleeding, injury to underlying structures, failed block. She understands, agrees to proceed..       Plan Factors-    Chart reviewed.   Existing labs reviewed.                   Induction- intravenous.    Postoperative Plan- Plan for postoperative opioid use. Planned trial extubation    Perioperative Resuscitation Plan - Level 1 - Full Code.       Informed Consent- Anesthetic plan and risks discussed with patient.  I personally reviewed this patient with the CRNA. Discussed and agreed on the Anesthesia Plan with the CRNA..

## 2024-07-10 NOTE — ED PROVIDER NOTES
History  Chief Complaint   Patient presents with    Abdominal Pain     Pt c/o upper abd pain x1 hr PTA. Denies N/V Pt states she has experienced this pain before but it has subsided with medication. 975 tylenol admin during transport       Abdominal Pain  Associated symptoms: chills    Associated symptoms: no chest pain, no diarrhea, no dysuria, no fever, no nausea, no shortness of breath and no vomiting        Nidhi Jeffries is a 58 y.o. female with history of HTN, HLD, RA, spinal stenosis with neurogenic claudication, GERD,  presenting for abdominal pain.    Patient reports that about 430 this morning she had sudden onset sharp right upper quadrant abdominal pain that radiates down the right side of her anterior abdomen.  Denies back pain, nausea, vomiting.  Had been feeling her normal prior to that.  No recent illnesses, no recent food changes.  No fever, chills, chest pain, shortness of breath, dysuria, flank pain, frequency, diarrhea, constipation, new extremity numbness, weakness or pain.    Per EMS report, patient was hypotensive. Given 975 of tylenol prior to presentation.    Prior to Admission Medications   Prescriptions Last Dose Informant Patient Reported? Taking?   Acetaminophen (TYLENOL ARTHRITIS PAIN PO) 7/10/2024 Self Yes Yes   Sig: Take by mouth   Multiple Vitamins-Minerals (CENTRUM SILVER PO) 7/10/2024 Self Yes Yes   Sig: Take by mouth   amLODIPine-benazepril (LOTREL 5-20) 5-20 MG per capsule 7/10/2024 Self Yes Yes   Sig: Take 1 capsule by mouth daily   aspirin 81 mg chewable tablet 2024 Self Yes Yes   Sig: Chew 81 mg daily   atorvastatin (LIPITOR) 20 mg tablet 7/10/2024 Self Yes Yes   gabapentin (NEURONTIN) 300 mg capsule 2024  No Yes   Sig: TAKE ONE CAPSULE BY MOUTH ONCE DAILY at bedtime   meloxicam (MOBIC) 15 mg tablet 7/10/2024 Self Yes Yes   spironolactone (ALDACTONE) 25 mg tablet 7/10/2024 Self Yes Yes      Facility-Administered Medications: None       Past Medical History:    Diagnosis Date    Hypertension     Rheumatoid arthritis (HCC)        Past Surgical History:   Procedure Laterality Date     SECTION         Family History   Problem Relation Age of Onset    Heart disease Mother     Hypertension Mother     Diabetes Mother     No Known Problems Father     No Known Problems Maternal Grandmother     No Known Problems Maternal Grandfather     No Known Problems Paternal Grandmother     Prostate cancer Paternal Grandfather 70    No Known Problems Brother     No Known Problems Brother     No Known Problems Brother     No Known Problems Son     Breast cancer Maternal Aunt 70    Ovarian cancer Paternal Aunt     Endometrial cancer Paternal Aunt 36    No Known Problems Family     Colon cancer Neg Hx      I have reviewed and agree with the history as documented.    E-Cigarette/Vaping    E-Cigarette Use Never User      E-Cigarette/Vaping Substances    Nicotine No     THC No     CBD No     Flavoring No     Other No     Unknown No      Social History     Tobacco Use    Smoking status: Former    Smokeless tobacco: Never   Vaping Use    Vaping status: Never Used   Substance Use Topics    Alcohol use: Not Currently    Drug use: No        Review of Systems   Unable to perform ROS: Acuity of condition   Constitutional:  Positive for chills. Negative for fever.   Respiratory:  Negative for shortness of breath.    Cardiovascular:  Negative for chest pain and palpitations.   Gastrointestinal:  Positive for abdominal pain. Negative for diarrhea, nausea and vomiting.   Genitourinary:  Negative for difficulty urinating, dysuria and flank pain.   Musculoskeletal:  Negative for back pain.   Neurological:  Negative for dizziness, weakness, light-headedness and numbness.       Physical Exam  ED Triage Vitals [07/10/24 0535]   Temperature Pulse Respirations Blood Pressure SpO2   97.8 °F (36.6 °C) 67 18 116/65 98 %      Temp Source Heart Rate Source Patient Position - Orthostatic VS BP Location FiO2 (%)    Oral Monitor Sitting Right arm --      Pain Score       9             Orthostatic Vital Signs  Vitals:    07/10/24 0535 07/10/24 0700 07/10/24 0730 07/10/24 0815   BP: 116/65 118/62 110/62 112/71   Pulse: 67 78 87 84   Patient Position - Orthostatic VS: Sitting Sitting Sitting        Physical Exam  Vitals and nursing note reviewed.   Constitutional:       General: She is in acute distress.      Appearance: She is well-developed. She is ill-appearing.   HENT:      Head: Normocephalic and atraumatic.      Mouth/Throat:      Mouth: Mucous membranes are moist.      Pharynx: Oropharynx is clear.   Eyes:      Extraocular Movements: Extraocular movements intact.      Conjunctiva/sclera: Conjunctivae normal.      Pupils: Pupils are equal, round, and reactive to light.   Cardiovascular:      Rate and Rhythm: Normal rate and regular rhythm.      Pulses: Normal pulses.      Heart sounds: Normal heart sounds. No murmur heard.  Pulmonary:      Effort: Pulmonary effort is normal. No respiratory distress.      Breath sounds: Normal breath sounds. No wheezing, rhonchi or rales.   Abdominal:      General: Abdomen is flat. Bowel sounds are normal. There is no distension.      Palpations: Abdomen is soft.      Tenderness: There is generalized abdominal tenderness and tenderness in the right upper quadrant, epigastric area and left upper quadrant.   Musculoskeletal:      Cervical back: Normal range of motion.      Right lower leg: No edema.      Left lower leg: No edema.   Skin:     General: Skin is warm and dry.      Capillary Refill: Capillary refill takes less than 2 seconds.   Neurological:      General: No focal deficit present.      Mental Status: She is alert and oriented to person, place, and time.      Sensory: No sensory deficit.      Motor: No weakness.   Psychiatric:         Mood and Affect: Mood normal.         Behavior: Behavior normal.         ED Medications  Medications   lactated ringers infusion (has no  administration in time range)   piperacillin-tazobactam (ZOSYN) IVPB 4.5 g (4.5 g Intravenous New Bag 7/10/24 0758)     Followed by   piperacillin-tazobactam (ZOSYN) IVPB (EXTENDED INFUSION) 4.5 g ( Intravenous MAR Unhold 7/10/24 0817)   lactated ringers bolus 1,000 mL (1,000 mL Intravenous New Bag 7/10/24 0750)   ceFAZolin (ANCEF) IVPB (premix in dextrose) 2,000 mg 50 mL (has no administration in time range)   ondansetron (ZOFRAN) injection 4 mg ( Intravenous MAR Unhold 7/10/24 0817)   pantoprazole (PROTONIX) 80 mg in sodium chloride 0.9 % 100 mL IVPB ( Intravenous MAR Unhold 7/10/24 0817)   acetaminophen (TYLENOL) tablet 650 mg (has no administration in time range)   oxyCODONE (ROXICODONE) IR tablet 5 mg (has no administration in time range)   oxyCODONE (ROXICODONE) IR tablet 10 mg (has no administration in time range)   HYDROmorphone (DILAUDID) injection 0.2 mg (has no administration in time range)   ceFAZolin (ANCEF) 2000 mg IVPB (premix in dextrose) **ADS Override Pull** (has no administration in time range)   atorvastatin (LIPITOR) tablet 20 mg (has no administration in time range)   Multivitamin liquid 15 mL (has no administration in time range)   gabapentin (NEURONTIN) capsule 300 mg (has no administration in time range)   ondansetron (ZOFRAN) injection 4 mg (4 mg Intravenous Given 7/10/24 0620)   ketorolac (TORADOL) injection 15 mg (15 mg Intravenous Given 7/10/24 0606)   sodium chloride 0.9 % bolus 1,000 mL (0 mL Intravenous Stopped 7/10/24 0803)   iohexol (OMNIPAQUE) 350 MG/ML injection (MULTI-DOSE) 100 mL (100 mL Intravenous Given 7/10/24 0647)   morphine injection 4 mg (4 mg Intravenous Given 7/10/24 0658)       Diagnostic Studies  Results Reviewed       Procedure Component Value Units Date/Time    Lactic acid, plasma (w/reflex if result > 2.0) [[id number]]  (Normal) Collected: 07/10/24 0744    Lab Status: Final result Specimen: Blood from Arm, Right Updated: 07/10/24 0825     LACTIC ACID 1.5 mmol/L      Narrative:      Result may be elevated if tourniquet was used during collection.    Blood culture [[id number]] Collected: 07/10/24 0744    Lab Status: In process Specimen: Blood from Arm, Right Updated: 07/10/24 0805    Blood culture [[id number]] Collected: 07/10/24 0744    Lab Status: In process Specimen: Blood from Arm, Left Updated: 07/10/24 0805    Protime-INR [[id number]]  (Normal) Collected: 07/10/24 0702    Lab Status: Final result Specimen: Blood from Arm, Right Updated: 07/10/24 0720     Protime 12.9 seconds      INR 0.91    APTT [[id number]]  (Normal) Collected: 07/10/24 0702    Lab Status: Final result Specimen: Blood from Arm, Right Updated: 07/10/24 0720     PTT 28 seconds     Lipase [[id number]]  (Normal) Collected: 07/10/24 0542    Lab Status: Final result Specimen: Blood from Arm, Right Updated: 07/10/24 0620     Lipase 53 u/L     Comprehensive metabolic panel [[id number]] Collected: 07/10/24 0542    Lab Status: Final result Specimen: Blood from Arm, Right Updated: 07/10/24 0620     Sodium 139 mmol/L      Potassium 3.8 mmol/L      Chloride 105 mmol/L      CO2 28 mmol/L      ANION GAP 6 mmol/L      BUN 18 mg/dL      Creatinine 0.70 mg/dL      Glucose 140 mg/dL      Calcium 9.0 mg/dL      AST 21 U/L      ALT 26 U/L      Alkaline Phosphatase 55 U/L      Total Protein 6.7 g/dL      Albumin 4.2 g/dL      Total Bilirubin 0.82 mg/dL      eGFR 94 ml/min/1.73sq m     Narrative:      [employer] guidelines for Chronic Kidney Disease (CKD):     Stage 1 with normal or high GFR (GFR > 90 mL/min/1.73 square meters)    Stage 2 Mild CKD (GFR = 60-89 mL/min/1.73 square meters)    Stage 3A Moderate CKD (GFR = 45-59 mL/min/1.73 square meters)    Stage 3B Moderate CKD (GFR = 30-44 mL/min/1.73 square meters)    Stage 4 Severe CKD (GFR = 15-29 mL/min/1.73 square meters)    Stage 5 End Stage CKD (GFR <15 mL/min/1.73 square meters)  Note: GFR calculation is accurate only with a steady state creatinine    UA  w Reflex to Microscopic w Reflex to Culture [[id number]]     Lab Status: No result Specimen: Urine     CBC and differential [[id number]]  (Abnormal) Collected: 07/10/24 0542    Lab Status: Final result Specimen: Blood from Arm, Right Updated: 07/10/24 0559     WBC 6.14 Thousand/uL      RBC 5.14 Million/uL      Hemoglobin 15.6 g/dL      Hematocrit 47.0 %      MCV 91 fL      MCH 30.4 pg      MCHC 33.2 g/dL      RDW 12.6 %      MPV 10.1 fL      Platelets 253 Thousands/uL      nRBC 0 /100 WBCs      Segmented % 55 %      Immature Grans % 0 %      Lymphocytes % 30 %      Monocytes % 7 %      Eosinophils Relative 6 %      Basophils Relative 2 %      Absolute Neutrophils 3.43 Thousands/µL      Absolute Immature Grans 0.02 Thousand/uL      Absolute Lymphocytes 1.85 Thousands/µL      Absolute Monocytes 0.40 Thousand/µL      Eosinophils Absolute 0.34 Thousand/µL      Basophils Absolute 0.10 Thousands/µL                    CT abdomen pelvis with contrast   Final Result by Verenice Knox MD (07/10 0733)      Findings most compatible with perforated distal gastric antral/pyloric ulcer.      Masslike proximal stomach opacity. Hematoma versus submucosal mass such as leiomyoma or GIST.            Workstation performed: [id number]         XR chest portable    (Results Pending)         Procedures  Procedures      ED Course  ED Course as of 07/10/24 0830   Wed Jul 10, 2024   0606 Patient in acute pain, especially in RUQ   0613 Per nursing, also complaining of right shoulder pain   0615 CBC and differential(!)  No leukocytosis, hemoglobin 15.6 with slightly elevated, no anemia, normal platelets.   0616 Blood Pressure: 116/65  116/65, HR 67, RR 18, SpO2 98%, 97.8 F   0616 Pain starting at about 4:30   0621 Comprehensive metabolic panel  Normal electrolytes, normal kidney function, normal liver function.   0621 LIPASE: 53  Normal   0718 CT abdomen pelvis with contrast  CT concerning for free air in the abdomen. Called to expedite read. Also  reached out to surgery to make them aware.   0721 Protime-INR  Normal   0721 PTT: 28  Normal   0731 Dr. Knox with radiology Radiology Dr. Knox per antral ulcer. Proximal upper stomach hemotoma. Shared results with surgery.   0735 CT abdomen pelvis with contrast  IMPRESSION:     Findings most compatible with perforated distal gastric antral/pyloric ulcer.     Masslike proximal stomach opacity. Hematoma versus submucosal mass such as leiomyoma or GIST.                             SBIRT 22yo+      Flowsheet Row Most Recent Value   Initial Alcohol Screen: US AUDIT-C     1. How often do you have a drink containing alcohol? 0 Filed at: 07/10/2024 0534   3b. FEMALE Any Age, or MALE 65+: How often do you have 4 or more drinks on one occassion? 0 Filed at: 07/10/2024 0534   Audit-C Score 0 Filed at: 07/10/2024 0534   JADE: How many times in the past year have you...    Used an illegal drug or used a prescription medication for non-medical reasons? Never Filed at: 07/10/2024 0534                  Medical Decision Making  Nidhi Jeffries is a 58 y.o. female presenting with abdominal pain. No vomiting, nausea, chest pain, shortness of breath Vitals unremarkable. Exam remarkable for patient in acute distress, diffuse abdominal tenderness, especially in right upper quadrant.    DDx including but not limited to: appendicitis, gastroenteritis, gastritis, PUD, GERD, gastroparesis, hepatitis, pancreatitis, colitis, enteritis, diverticulitis, food poisoning, mesenteric adenitis, epiploic appendagitis, mesenteric panniculitis, mesenteric ischemia, IBD, IBS, ileus, bowel obstruction, volvulus, internal hernia, AAA, cholecystitis, biliary colic, choledocholithiasis, perforated viscus, tumor, splenic etiology, constipation, pelvic pathology, renal colic, pyelonephritis, UTI; doubt cardiac etiology.    Based on results available while the patient was in the ED:  Patient was given toradol, morphine, zofran, IVF.    CT concerning for  perforated ulcer, reached out to surgery who came to evaluate patient who presented immediately and added lab work and gave antibiotics. They will accept to surgery service for further evaluation.    See ED course for further updates.    Based on these results and H&P, suspect perforated ulcer. Plan admit to surgery for further evaluation.    Results, clinical impressions, and plan were discussed with patient and family. They expressed understanding and were in agreement with plan. Patient was given the opportunity to ask questions in ED. All questions and concerns were addressed in ED. After evaluation and workup in the emergency department Discussed patient's case with surgery regarding admission who accepted the patient for further evaluation and management under Dr. Lopez (Surgery).    Amount and/or Complexity of Data Reviewed  Independent Historian: ba  External Data Reviewed: labs, radiology and notes.  Labs: ordered. Decision-making details documented in ED Course.  Radiology: ordered. Decision-making details documented in ED Course.    Risk  Prescription drug management.  Decision regarding hospitalization.          Disposition  Final diagnoses:   Abdominal pain   Perforated ulcer (HCC)     Time reflects when diagnosis was documented in both MDM as applicable and the Disposition within this note       Time User Action Codes Description Comment    7/10/2024  7:37 AM Aramis Hoffman Add [K25.1] Acute gastric ulcer with perforation (HCC)     7/10/2024  8:19 AM Lilia Pollard Add [R10.9] Abdominal pain     7/10/2024  8:19 AM Lilia Pollard Add [K27.5] Perforated ulcer (HCC)           ED Disposition       ED Disposition   Admit    Condition   Stable    Date/Time   Wed Jul 10, 2024 0812    Comment   Case was discussed with Surgery and the patient's admission status was agreed to be Admission Status: inpatient status to the service of Dr. Lopez.             Follow-up Information    None          Current Discharge Medication List        CONTINUE these medications which have NOT CHANGED    Details   Acetaminophen (TYLENOL ARTHRITIS PAIN PO) Take by mouth      amLODIPine-benazepril (LOTREL 5-20) 5-20 MG per capsule Take 1 capsule by mouth daily      aspirin 81 mg chewable tablet Chew 81 mg daily      atorvastatin (LIPITOR) 20 mg tablet       gabapentin (NEURONTIN) 300 mg capsule TAKE ONE CAPSULE BY MOUTH ONCE DAILY at bedtime  Qty: 30 capsule, Refills: 5    Associated Diagnoses: Lumbar stenosis with neurogenic claudication      meloxicam (MOBIC) 15 mg tablet       Multiple Vitamins-Minerals (CENTRUM SILVER PO) Take by mouth      spironolactone (ALDACTONE) 25 mg tablet            No discharge procedures on file.    PDMP Review       None             ED Provider  Attending physically available and evaluated Nidhi Jeffries. I managed the patient along with the ED Attending.    Electronically Signed by           Lilia Pollard MD  07/10/24 0865       Lilia Pollard MD  07/10/24 0881

## 2024-07-10 NOTE — PROGRESS NOTES
"Progress Note  Nidhi Jeffries 58 y.o. female MRN: [id number]  Unit/Bed#: S -01 Encounter: [id number]    Assessment  58F with perforated pyloric ulcer, now s/p 7/10 exlap, modified Amor patch repair.    Plan  - maintain NPO/NGT, contrast study in 5d to eval repair  - d/c Rosenbaum  - mIVF@100  - continue PCA  - continue ctx/flagyl/diflucan for 5d postop  - PPI bid, hold NSAIDs  - f/u Hpylori  - DVT ppx    Subjective/Objective   NAOE. Pain controlled. No n/v. No BM, no flatus. Will walk today.    VSS on 2L NC.  /72   Pulse 76   Temp 97.9 °F (36.6 °C)   Resp 18   Ht 5' 2\" (1.575 m)   Wt 89.8 kg (198 lb)   LMP 09/26/2020 (Exact Date)   SpO2 93%   BMI 36.21 kg/m²     Physical Exam:  General: NAD  CV: nl rate  Lungs: nl wob. No resp distress.  ABD: Soft, ND, upper tenderness, CDI. NGT, SYLVIA in place.  Extrem: No CCE  Rosenbaum    NGT: 250cc light bilious  SYLVIA: 115cc ss  UOP: 1500cc    Recent Labs     07/10/24  0542 07/10/24  0702 07/11/24  0519   WBC 6.14  --  16.56*   HGB 15.6*  --  13.2     --  199   SODIUM 139  --  136   K 3.8  --  4.2     --  105   CO2 28  --  26   BUN 18  --  17   CREATININE 0.70  --  0.61   GLUC 140  --  127   CALCIUM 9.0  --  8.5   MG  --   --  1.9   PHOS  --   --  3.2   AST 21  --   --    ALT 26  --   --    ALKPHOS 55  --   --    TBILI 0.82  --   --    LIPASE 53  --   --    EGFR 94  --  98   PTT  --  28  --    INR  --  0.91  --        "

## 2024-07-10 NOTE — OP NOTE
OPERATIVE REPORT  PATIENT NAME: Nidhi Jeffries    :  1965  MRN: [id number]  Pt Location: AN OR ROOM 02    SURGERY DATE: 7/10/2024    Surgeons and Role:     * Luis Alberto Lopez DO - Primary     * Chris Mak MD - Assisting    Preop Diagnosis:  Acute gastric ulcer with perforation (HCC) [K25.1] perforated viscus    Postoperative diagnosis:  Perforated pyloric channel ulcer  Upper gastric mass    Procedure(s):  LAPAROTOMY EXPLORATORY. AMOR PATCH REPAIR of perforated pyloric ulcer    Specimen(s):  ID Type Source Tests Collected by Time Destination   A : GASTRIC MUCOSA FOR H-PYLORI Tissue Stomach HELICOBACTER PYLORI CULTURE Luis Alberto Lopez,  7/10/2024 0908    B :  Tissue Stomach HELICOBACTER PYLORI CULTURE Luis Alberto Lopez, DO 7/10/2024 0958        Estimated Blood Loss:   100 mL    Drains:  Closed/Suction Drain Lateral;Right RLQ Bulb 19 Fr. (Active)   Number of days: 0       NG/OG/Enteral Tube Nasogastric 18 Fr Left nare (Active)   Number of days: 0       Urethral Catheter Latex 16 Fr. (Active)   Number of days: 0       Anesthesia Type:   General    Operative Indications:  Perforated viscus suspect anterior antral ulcer      Operative Findings:  Perforated millimeters anterior pyloric channel ulcer.  Modified Amor patch repair  Height 62 inches weight 90 kg / 198 pounds.  BMI 36.  ASA 3E.  Wound class III    Palpable golf ball sized mass high up in the stomach on the lesser curve near the GE junction as noted by CT.  Liver was smooth without any type of masses.  No signs of any intra-abdominal malignancy      Complications:   None    Procedure and Technique:  Patient was brought the operative suite and identified by visualization, conversation, by armband.  Sequential compression pumps were placed.  She was given Zosyn in the emergency room and Ancef perioperatively.  Sequential compression pumps were placed.  Once under general anesthesia abdomen was prepped and draped in a sterile fashion.   NG tube was placed by anesthesia.  Upper midline incision was made above the umbilicus extending just below it.  Subcutaneous tissue was divided with hot cautery to the fascia fascia was carefully opened up with cutting.  Access was then gained into the abdominal cavity.  Small amount of dark brown murky fluid was noted and suctioned out.  She had some adhesions into the lower abdomen which were carefully lysed with a hot cautery.  The abdomen then explored.  Sigmoid colon was noted but no signs of perforated sigmoid diverticulitis.  Cecum was normal with a normal looking appendix.  Then looked in the upper abdomen and found a pyloric channel ulcer in the anterior aspect.  I did take some of the antral mucosa with a right angle clamp to send down for H. pylori.  The ulcer was smooth and not irregular in appearance.  This was closed with 2 oh silks in a simple fashion x 3.  A tongue of omentum was then fashioned to create a Amor patch.  2-0 silk Kacey stitches were then used to fashion the patch over the repair in a generous fashion.  There is no tension on the omentum.  No other pathology was found in the abdomen.  Liver was smooth without nodularity.  Copious irrigation was carried out throughout the abdominal cavity.  19 Fijian Dariel drain was placed via the right upper quadrant and towards the lesser sac superior to the ulcer repair.  Did palpate the stomach and the NG tube was found to be in the mid body of the stomach.  As seen on the CT there was a smooth palpable mass high up by the GE junction along the lesser curve.  No acute pathology this will be worked up later.    Remaining aspects of the omentum were placed over the small bowel.  Irrigation was carried out.  Fascia was closed using #1 PDS in a running continuous fashion.  Bike was used to close subcutaneous tissue.  Skin was then closed with skin staples.  Drain was attached to the skin using a 2-0 nylon.  Wound was washed and dried.  Sterile  dressings were applied.  She was awakened in the operating room returned to the recovery area in stable condition having tolerated the procedure well.    Tap block was performed by anesthesia prior to leaving the room.   I was present for the entire procedure.    Patient Disposition:  PACU       SIGNATURE: Luis Alberto Lopez DO  DATE: July 10, 2024  TIME: 9:59 AM

## 2024-07-11 PROBLEM — Z98.890 S/P EXPLORATORY LAPAROTOMY: Status: ACTIVE | Noted: 2024-07-11

## 2024-07-11 LAB
ANION GAP SERPL CALCULATED.3IONS-SCNC: 5 MMOL/L (ref 4–13)
BASOPHILS # BLD AUTO: 0.04 THOUSANDS/ÂΜL (ref 0–0.1)
BASOPHILS NFR BLD AUTO: 0 % (ref 0–1)
BUN SERPL-MCNC: 17 MG/DL (ref 5–25)
CALCIUM SERPL-MCNC: 8.5 MG/DL (ref 8.4–10.2)
CHLORIDE SERPL-SCNC: 105 MMOL/L (ref 96–108)
CO2 SERPL-SCNC: 26 MMOL/L (ref 21–32)
CREAT SERPL-MCNC: 0.61 MG/DL (ref 0.6–1.3)
EOSINOPHIL # BLD AUTO: 0 THOUSAND/ÂΜL (ref 0–0.61)
EOSINOPHIL NFR BLD AUTO: 0 % (ref 0–6)
ERYTHROCYTE [DISTWIDTH] IN BLOOD BY AUTOMATED COUNT: 13 % (ref 11.6–15.1)
GFR SERPL CREATININE-BSD FRML MDRD: 98 ML/MIN/1.73SQ M
GLUCOSE SERPL-MCNC: 127 MG/DL (ref 65–140)
HCT VFR BLD AUTO: 39.4 % (ref 34.8–46.1)
HGB BLD-MCNC: 13.2 G/DL (ref 11.5–15.4)
IMM GRANULOCYTES # BLD AUTO: 0.12 THOUSAND/UL (ref 0–0.2)
IMM GRANULOCYTES NFR BLD AUTO: 1 % (ref 0–2)
LYMPHOCYTES # BLD AUTO: 0.75 THOUSANDS/ÂΜL (ref 0.6–4.47)
LYMPHOCYTES NFR BLD AUTO: 5 % (ref 14–44)
MAGNESIUM SERPL-MCNC: 1.9 MG/DL (ref 1.9–2.7)
MCH RBC QN AUTO: 30.5 PG (ref 26.8–34.3)
MCHC RBC AUTO-ENTMCNC: 33.5 G/DL (ref 31.4–37.4)
MCV RBC AUTO: 91 FL (ref 82–98)
MONOCYTES # BLD AUTO: 0.86 THOUSAND/ÂΜL (ref 0.17–1.22)
MONOCYTES NFR BLD AUTO: 5 % (ref 4–12)
NEUTROPHILS # BLD AUTO: 14.79 THOUSANDS/ÂΜL (ref 1.85–7.62)
NEUTS SEG NFR BLD AUTO: 89 % (ref 43–75)
NRBC BLD AUTO-RTO: 0 /100 WBCS
PHOSPHATE SERPL-MCNC: 3.2 MG/DL (ref 2.3–4.1)
PLATELET # BLD AUTO: 199 THOUSANDS/UL (ref 149–390)
PMV BLD AUTO: 10.5 FL (ref 8.9–12.7)
POTASSIUM SERPL-SCNC: 4.2 MMOL/L (ref 3.5–5.3)
RBC # BLD AUTO: 4.33 MILLION/UL (ref 3.81–5.12)
SODIUM SERPL-SCNC: 136 MMOL/L (ref 135–147)
WBC # BLD AUTO: 16.56 THOUSAND/UL (ref 4.31–10.16)

## 2024-07-11 PROCEDURE — 99232 SBSQ HOSP IP/OBS MODERATE 35: CPT | Performed by: INTERNAL MEDICINE

## 2024-07-11 PROCEDURE — 83735 ASSAY OF MAGNESIUM: CPT

## 2024-07-11 PROCEDURE — 99254 IP/OBS CNSLTJ NEW/EST MOD 60: CPT

## 2024-07-11 PROCEDURE — 99024 POSTOP FOLLOW-UP VISIT: CPT | Performed by: SURGERY

## 2024-07-11 PROCEDURE — 84100 ASSAY OF PHOSPHORUS: CPT

## 2024-07-11 PROCEDURE — 85025 COMPLETE CBC W/AUTO DIFF WBC: CPT

## 2024-07-11 PROCEDURE — 80048 BASIC METABOLIC PNL TOTAL CA: CPT

## 2024-07-11 RX ORDER — ACETAMINOPHEN 10 MG/ML
1000 INJECTION, SOLUTION INTRAVENOUS EVERY 6 HOURS
Status: DISCONTINUED | OUTPATIENT
Start: 2024-07-11 | End: 2024-07-15

## 2024-07-11 RX ORDER — MAGNESIUM SULFATE 1 G/100ML
1 INJECTION INTRAVENOUS ONCE
Status: COMPLETED | OUTPATIENT
Start: 2024-07-11 | End: 2024-07-11

## 2024-07-11 RX ADMIN — PANTOPRAZOLE SODIUM 40 MG: 40 INJECTION, POWDER, FOR SOLUTION INTRAVENOUS at 20:02

## 2024-07-11 RX ADMIN — ACETAMINOPHEN 1000 MG: 10 INJECTION INTRAVENOUS at 13:18

## 2024-07-11 RX ADMIN — MAGNESIUM SULFATE HEPTAHYDRATE 1 G: 1 INJECTION, SOLUTION INTRAVENOUS at 07:54

## 2024-07-11 RX ADMIN — FLUCONAZOLE 400 MG: 2 INJECTION, SOLUTION INTRAVENOUS at 14:49

## 2024-07-11 RX ADMIN — METRONIDAZOLE 500 MG: 500 INJECTION, SOLUTION INTRAVENOUS at 02:26

## 2024-07-11 RX ADMIN — SODIUM CHLORIDE, SODIUM LACTATE, POTASSIUM CHLORIDE, AND CALCIUM CHLORIDE 100 ML/HR: .6; .31; .03; .02 INJECTION, SOLUTION INTRAVENOUS at 16:15

## 2024-07-11 RX ADMIN — Medication 15 ML: at 08:25

## 2024-07-11 RX ADMIN — ACETAMINOPHEN 1000 MG: 10 INJECTION INTRAVENOUS at 20:02

## 2024-07-11 RX ADMIN — PANTOPRAZOLE SODIUM 40 MG: 40 INJECTION, POWDER, FOR SOLUTION INTRAVENOUS at 08:25

## 2024-07-11 RX ADMIN — CEFTRIAXONE SODIUM 1000 MG: 10 INJECTION, POWDER, FOR SOLUTION INTRAVENOUS at 14:17

## 2024-07-11 RX ADMIN — METRONIDAZOLE 500 MG: 500 INJECTION, SOLUTION INTRAVENOUS at 10:20

## 2024-07-11 RX ADMIN — METRONIDAZOLE 500 MG: 500 INJECTION, SOLUTION INTRAVENOUS at 18:25

## 2024-07-11 RX ADMIN — ACETAMINOPHEN 1000 MG: 10 INJECTION INTRAVENOUS at 07:36

## 2024-07-11 NOTE — UTILIZATION REVIEW
Initial Clinical Review      Age/Sex: 58 y.o. female with hx GERD, rheumatoid arthritis, HTN, spinal stenosis who presented thru ED with sudden abdominal pain RUQ, associated nausea .n exam, abdomen distended , soft, diffusely tender, signs of peritonitis, rebound tenderness and guarding. CT AP shows perforated distal gastric antral/pyloric ulcer. Pt given IVF, IV abxx, Iv analgesics in ED,  Pt admitted as Inpatient to level 2 SD with likely perforated peptic ulcer  . PLan- NPO, NGT  IVF. PPI .  To OR .     Surgery Date: 7/10/24 @ 0853   Procedure: LAPAROTOMY EXPLORATORY. AMOR PATCH REPAIR of perforated pyloric ulcer   Anesthesia: general , TAP block postoperatively  Operative Findings: Perforated millimeters anterior pyloric channel ulcer.  Modified Amor patch repair  Palpable golf ball sized mass high up in the stomach on the lesser curve near the GE junction as noted by CT.  Liver was smooth without any type of masses.  No signs of any intra-abdominal malignancy    POD #0   GI consult-  Abdomen soft, non distended , appropriate post op tenderness, NGT in place- scant bilious output , SYLVIA drain in place with bloody output. Normal bowel sounds.Recommend endoscopic ultrasound/EGD in about 6 to 8 weeks to evaluate the mass in the lesser curvature near the GE junction. Suspect this could be GIST. Meanwhile recommend pantoprazole 40 mg twice daily. Avoid NSAID's .       POD#1 Progress Note: 7/11    Abdomen soft, ND, upper tenderness, incision CDI. NGT patent for 250cc light bilious  . SYLVIA in place, 115 ml serosang drainage . Labs WBC 16.56 today. Pt reports pain controlled with Dilaudid PCCA.  No flatus or bm yet. Will ambulate today. Pt remains NPO with IVF infusing . Plan for  contrast study in 5d to eval repair . Plan to d/c thomas cath today. Continue IV abx :  ceftriaxone /flagyl/diflucan for 5d postop . PPI BID . F/U H pylori . Mag low- repleted . CBC, BMP. Mag in am .         Admission Orders:  Date/Time/Statement:   Admission Orders (From admission, onward)       Ordered        07/10/24 0743  Inpatient Admission  Once                          Orders Placed This Encounter   Procedures    Inpatient Admission     Standing Status:   Standing     Number of Occurrences:   1     Order Specific Question:   Level of Care     Answer:   Level 2 Stepdown / HOT [14]     Order Specific Question:   Estimated length of stay     Answer:   More than 2 Midnights     Order Specific Question:   Certification     Answer:   I certify that inpatient services are medically necessary for this patient for a duration of greater than two midnights. See H&P and MD Progress Notes for additional information about the patient's course of treatment.       Vital Signs (last 3 days)       Date/Time Temp Pulse Resp BP MAP (mmHg) SpO2 Calculated FIO2 (%) - Nasal Cannula O2 Flow Rate (L/min) Nasal Cannula O2 Flow Rate (L/min) O2 Device Patient Position - Orthostatic VS Blank Coma Scale Score Pain    07/11/24 07:36:49 98.2 °F (36.8 °C) 87 14 113/69 84 95 % -- -- -- -- -- -- --    07/11/24 0710 -- -- 18 -- -- -- -- -- -- -- -- -- 3    07/11/24 0703 -- -- -- -- -- -- -- -- -- -- -- -- 3    07/11/24 0400 -- -- -- -- -- -- -- -- -- -- -- 15 --    07/11/24 02:29:23 97.9 °F (36.6 °C) 76 -- 109/72 84 93 % -- -- -- -- -- -- --    07/11/24 0000 -- -- -- -- -- -- -- -- -- -- -- 15 7    07/10/24 2356 -- 84 -- -- -- 92 % -- -- -- -- -- -- --    07/10/24 2347 -- -- -- -- -- 90 % -- -- -- -- -- -- --    07/10/24 22:44:13 99.1 °F (37.3 °C) 94 -- 118/70 86 94 % -- -- -- -- -- -- --    07/10/24 2000 -- -- -- -- -- -- -- -- -- -- -- 15 5    07/10/24 1900 -- -- -- -- -- -- 28 -- 2 L/min Nasal cannula -- -- --    07/10/24 18:55:40 97.9 °F (36.6 °C) 91 -- 118/76 90 90 % -- -- -- -- -- -- --    07/10/24 1850 -- -- -- -- -- -- -- -- -- -- -- -- 7    07/10/24 1700 98.8 °F (37.1 °C) 90 18 107/52 -- 96 % 28 -- 2 L/min Nasal cannula -- 15 2    07/10/24 1615 -- 96 20  125/69 88 94 % -- 4 L/min -- Nasal cannula -- -- No Pain    07/10/24 1600 -- 96 17 116/64 83 95 % -- 4 L/min -- Nasal cannula -- -- No Pain    07/10/24 1545 -- 94 16 114/61 79 95 % -- 4 L/min -- Nasal cannula -- -- --    07/10/24 1530 -- 96 17 116/64 79 95 % -- 4 L/min -- Nasal cannula -- -- No Pain    07/10/24 1515 98.2 °F (36.8 °C) 96 16 108/62 79 96 % -- 4 L/min -- Nasal cannula -- -- --    07/10/24 1500 -- 90 18 107/62 77 95 % -- 4 L/min -- Nasal cannula -- -- --    07/10/24 1445 -- 96 15 110/61 80 96 % -- 4 L/min -- Nasal cannula -- -- No Pain    07/10/24 1430 -- 90 18 107/58 78 93 % -- 4 L/min -- Nasal cannula -- -- --    07/10/24 1415 -- 84 20 117/66 85 92 % -- 4 L/min -- Nasal cannula -- -- --    07/10/24 1400 -- 94 20 114/63 83 90 % -- 4 L/min -- Nasal cannula -- -- 3    07/10/24 1330 -- 88 15 106/60 79 92 % -- 4 L/min -- Nasal cannula -- -- --    07/10/24 1300 -- 92 17 114/67 84 94 % -- 4 L/min -- Nasal cannula -- 14 3    07/10/24 1230 -- 92 12 109/56 76 94 % -- 2 L/min -- Nasal cannula -- -- 5    07/10/24 1200 -- 90 13 123/60 81 93 % -- 2 L/min -- Nasal cannula -- -- 5    07/10/24 1130 -- 90 14 118/57 81 93 % -- 2 L/min -- Nasal cannula -- -- 3    07/10/24 1116 -- 90 14 116/56 81 92 % -- 2 L/min -- Nasal cannula -- -- 4    07/10/24 1106 -- 94 21 124/60 84 92 % -- 2 L/min -- Nasal cannula -- -- 9    07/10/24 1100 -- 92 20 124/60 84 91 % -- 2 L/min -- Nasal cannula -- -- --    07/10/24 1045 -- 92 17 115/55 78 100 % -- 6 L/min -- Simple mask -- -- No Pain    07/10/24 1039 -- 90 16 115/55 81 100 % -- 6 L/min -- Simple mask -- -- No Pain    07/10/24 1034 98.3 °F (36.8 °C) 88 20 116/59 82 99 % -- 6 L/min -- Simple mask -- 14 No Pain    07/10/24 0815 97.7 °F (36.5 °C) 84 18 112/71 -- 96 % -- -- -- None (Room air) -- -- 9    07/10/24 0730 -- 87 18 110/62 80 99 % -- -- -- None (Room air) Sitting -- --    07/10/24 0700 -- 78 18 118/62 86 98 % -- -- -- None (Room air) Sitting -- --    07/10/24 0658 -- -- -- -- -- --  -- -- -- -- -- -- 9    07/10/24 0606 -- -- -- -- -- -- -- -- -- -- -- -- 9    07/10/24 0543 -- -- -- -- -- -- -- -- -- None (Room air) -- 15 --    07/10/24 0535 97.8 °F (36.6 °C) 67 18 116/65 -- 98 % -- -- -- None (Room air) Sitting -- 9          Weight (last 2 days)       Date/Time Weight    07/10/24 0815 89.8 (198)    07/10/24 0700 90.1 (198.63)            Pertinent Labs/Diagnostic Test Results:   Radiology:  XR chest portable   Final Interpretation by Joe Rodriguez MD (07/11 0759)      Minimal atelectasis at the left lung base.            Workstation performed: [id number]         CT abdomen pelvis with contrast   Final Interpretation by Verenice Knox MD (07/10 0733)      Findings most compatible with perforated distal gastric antral/pyloric ulcer.      Masslike proximal stomach opacity. Hematoma versus submucosal mass such as leiomyoma or GIST.            Workstation performed: [id number]                 Results from last 7 days   Lab Units 07/11/24  0519 07/10/24  0542   WBC Thousand/uL 16.56* 6.14   HEMOGLOBIN g/dL 13.2 15.6*   HEMATOCRIT % 39.4 47.0*   PLATELETS Thousands/uL 199 253   TOTAL NEUT ABS Thousands/µL 14.79* 3.43         Results from last 7 days   Lab Units 07/11/24  0519 07/10/24  0542   SODIUM mmol/L 136 139   POTASSIUM mmol/L 4.2 3.8   CHLORIDE mmol/L 105 105   CO2 mmol/L 26 28   ANION GAP mmol/L 5 6   BUN mg/dL 17 18   CREATININE mg/dL 0.61 0.70   EGFR ml/min/1.73sq m 98 94   CALCIUM mg/dL 8.5 9.0   MAGNESIUM mg/dL 1.9  --    PHOSPHORUS mg/dL 3.2  --      Results from last 7 days   Lab Units 07/10/24  0542   AST U/L 21   ALT U/L 26   ALK PHOS U/L 55   TOTAL PROTEIN g/dL 6.7   ALBUMIN g/dL 4.2   TOTAL BILIRUBIN mg/dL 0.82         Results from last 7 days   Lab Units 07/11/24  0519 07/10/24  0542   GLUCOSE RANDOM mg/dL 127 140               Results from last 7 days   Lab Units 07/10/24  0702   PROTIME seconds 12.9   INR  0.91   PTT seconds 28             Results from last 7 days   Lab Units  07/10/24  0744   LACTIC ACID mmol/L 1.5               Results from last 7 days   Lab Units 07/10/24  0542   LIPASE u/L 53           Results from last 7 days   Lab Units 07/10/24  0744   BLOOD CULTURE  Received in Microbiology Lab. Culture in Progress.  Received in Microbiology Lab. Culture in Progress.                   Diet: NPO. NGT in place to MCS    Mobility: ambulate Q shift  DVT Prophylaxis: ambulation , SCD    Medications/Pain Control:   Scheduled Medications:  cefTRIAXone, 1,000 mg, Intravenous, Q24H  fluconazole, 400 mg, Intravenous, Q24H  magnesium sulfate, 1 g, Intravenous, Once x1 7/11 @ 0754  metroNIDAZOLE, 500 mg, Intravenous, Q8H  Multivitamin, 15 mL, Per NG Tube, Daily  pantoprazole, 40 mg, Intravenous, Q12H SHELIA      Continuous IV Infusions:  HYDROmorphone, , Intravenous, Continuous  lactated ringers, 100 mL/hr, Intravenous, Continuous      PRN Meds:  acetaminophen, 1,000 mg, Intravenous, Q6H PRN x1 7/10, x1 7/11  HYDROmorphone, 0.2 mg, Intravenous, Q3H PRN  naloxone, 0.04 mg, Intravenous, Q1MIN PRN  ondansetron, 4 mg, Intravenous, Q4H PRN x1 7/10     HYDROmorphone (DILAUDID) injection 0.5 mg  Dose: 0.5 mg  Freq: Every 5 minutes PRN Route: IV  PRN Reason: Pain - PACU  PRN Comment: [facility]. Second Line  Start: 07/10/24 1037 End: 07/10/24 1845 x1 7/10     Network Utilization Review Department  ATTENTION: Please call with any questions or concerns to [phone] and carefully listen to the prompts so that you are directed to the right person. All voicemails are confidential.   For Discharge needs, contact Care Management DC Support Team at [phone] opt. 2  Send all requests for admission clinical reviews, approved or denied determinations and any other requests to dedicated fax number below belonging to the campus where the patient is receiving treatment. List of dedicated fax numbers for the Facilities:  FACILITY NAME UR FAX NUMBER   ADMISSION DENIALS (Administrative/Medical Necessity)  [phone]   DISCHARGE SUPPORT TEAM (NETWORK) [phone]   PARENT CHILD HEALTH (Maternity/NICU/Pediatrics) [phone]   [facility] [phone]   [facility] [phone]   [facility] [phone]   [facility] [phone]   Count includes the [facility] [phone]   [facility] [phone]   [facility] [phone]   [facility] [phone]   [facility] [phone]   [facility] [phone]   [facility] [phone]   [facility] [phone]

## 2024-07-11 NOTE — PLAN OF CARE
Problem: PAIN - ADULT  Goal: Verbalizes/displays adequate comfort level or baseline comfort level  Description: Interventions:  - Encourage patient to monitor pain and request assistance  - Assess pain using appropriate pain scale  - Administer analgesics based on type and severity of pain and evaluate response  - Implement non-pharmacological measures as appropriate and evaluate response  - Consider cultural and social influences on pain and pain management  - Notify physician/advanced practitioner if interventions unsuccessful or patient reports new pain  Outcome: Progressing     Problem: INFECTION - ADULT  Goal: Absence or prevention of progression during hospitalization  Description: INTERVENTIONS:  - Assess and monitor for signs and symptoms of infection  - Monitor lab/diagnostic results  - Monitor all insertion sites, i.e. indwelling lines, tubes, and drains  - Monitor endotracheal if appropriate and nasal secretions for changes in amount and color  - [place] appropriate cooling/warming therapies per order  - Administer medications as ordered  - Instruct and encourage patient and family to use good hand hygiene technique  - Identify and instruct in appropriate isolation precautions for identified infection/condition  Outcome: Progressing  Goal: Absence of fever/infection during neutropenic period  Description: INTERVENTIONS:  - Monitor WBC    Outcome: Progressing

## 2024-07-11 NOTE — ASSESSMENT & PLAN NOTE
Presented to the emergency department 7/10/2024 with new onset right upper quadrant abdominal pain  Found to have perforated pyloric ulcer  Now status post ex lap, modified Amor patch  Remains n.p.o. with NGT in place  Received bilateral rectus sheath blocks with Exparel 7/10 for postop analgesia  Resolved appropriately, patient denies any residual sensory deficit and pain continues to be well-controlled    Multimodal analgesia:  Continue Dilaudid PCA pump at current settings:  Continuous rate 0, PCA dose 0.2, lockout 5 minutes, hour limit 2.2 mg  Modify as needed IV Tylenol to 1000 mg every 6 hours scheduled  Narcan as needed for respiratory depression/opioid reversal    Once NGT is removed and diet is advanced recommend transitioning to primarily oral multimodal regimen

## 2024-07-11 NOTE — PHYSICAL THERAPY NOTE
07/11/24 1430   Note Type   Note type Cancelled Session     Cancel Reasons Medical status   Additional Comments PT orders received and chart reviewed.  Per RN, patient not appropriate for skilled PT services this p.m.  Will follow as medically appropriate.   Licensure   [place] License Number  Nicole Mchugh, PT

## 2024-07-11 NOTE — CONSULTS
Consultation - Acute Pain Service  Nidhi Jeffries 58 y.o. female MRN: [id number]  Unit/Bed#: S -01 Encounter: [id number]               Nidhi Jeffries is a 58 y.o. female with past medical history of rheumatoid arthritis, GERD, hypertension, spinal stenosis who presented to the emergency department with a sudden onset right upper quadrant abdominal pain.  She was found to have a perforated pyloric ulcer and is now status post exploratory laparotomy, modified Amor patch repair 7/10/2024.  She received bilateral rectus sheath blocks with Exparel for postoperative analgesia.  APS consulted for postoperative block follow-up.    Patient seen and examined at bedside this afternoon, resting in bed without acute distress.  She reports abdominal pain has been relatively well-controlled postoperatively; currently on Dilaudid PCA pump.  Denies opioid-induced side effects, nausea/vomiting or bowel function.  Remains n.p.o. with NGT in place.    S/P exploratory laparotomy  Assessment & Plan  Presented to the emergency department 7/10/2024 with new onset right upper quadrant abdominal pain  Found to have perforated pyloric ulcer  Now status post ex lap, modified Amor patch  Remains n.p.o. with NGT in place  Received bilateral rectus sheath blocks with Exparel 7/10 for postop analgesia  Resolved appropriately as of this morning, patient denies any residual sensory deficit and pain continues to be well-controlled    Multimodal analgesia:  Continue Dilaudid PCA pump at current settings:  Continuous rate 0, PCA dose 0.2, lockout 5 minutes, hour limit 2.2 mg  Modify as needed IV Tylenol to 1000 mg every 6 hours scheduled  Narcan as needed for respiratory depression/opioid reversal    Once NGT is removed and diet is advanced recommend transitioning to primarily oral multimodal regimen    Lumbar stenosis with neurogenic claudication  Assessment & Plan  Currently follows with . [facility] (Dr. Robles) for management of  chronic back pain in setting of spinal stenosis.  Home regimen includes gabapentin 300 mg daily at bedtime and Tylenol as needed          APS will continue to follow. Please contact Acute Pain Service - via [employer] from [phone] with additional questions or concerns. See Ricardo or Pietro for additional contacts and after hours information.     History of Present Illness    Admit Date:  7/10/2024  Hospital Day:  1 day  Primary Service:  Surgery-General  Attending Provider:  Luis Alberto Lopez DO  Physician Requesting Consult: Luis Alberto Lopez DO  Reason for Consult / Principal Problem: Postop pain/block follow-up  HPI: Nidhi Jeffries is a 58 y.o. year old female with past medical history of rheumatoid arthritis, GERD, hypertension, spinal stenosis who presented to the emergency department with a sudden onset right upper quadrant abdominal pain.  She was found to have a perforated pyloric ulcer and is now status post exploratory laparotomy, modified Amor patch repair 7/10/2024.  She received bilateral rectus sheath blocks with Exparel for postoperative analgesia.  APS consulted for postoperative block follow-up.    Patient seen and examined at bedside this afternoon, resting in bed without acute distress.  She reports abdominal pain has been relatively well-controlled postoperatively; currently on Dilaudid PCA pump.  Denies opioid-induced side effects, nausea/vomiting or bowel function.  Remains n.p.o. with NGT in place.    Current pain location(s): Pain Score: 3  Pain Location/Orientation: Location: Abdomen  Pain Scale: Pain Assessment Tool: 0-10      Pain History: Chronic back pain  Pain Management Physician: Dr. Travis SHARP have reviewed the patient's controlled substance dispensing history in the Prescription Drug Monitoring Program in compliance with the [facility] regulations before prescribing any controlled substances.     Inpatient consult to Acute Pain Service  Consult performed by: Osvaldo OBREGON  ALPHONSO Fox  Consult ordered by: Christiano Ng MD          Review of Systems   Constitutional:  Positive for activity change.   Gastrointestinal:  Positive for abdominal pain.   Genitourinary:  Negative for difficulty urinating.   Musculoskeletal:  Negative for back pain.   Neurological:  Negative for dizziness, weakness and headaches.   All other systems reviewed and are negative.      Historical Information   Past Medical History:   Diagnosis Date    Hypertension     Rheumatoid arthritis (HCC)      Past Surgical History:   Procedure Laterality Date     SECTION      LAPAROTOMY N/A 7/10/2024    Procedure: LAPAROTOMY EXPLORATORY, MODIFIED KYRIE PATCH;  Surgeon: Luis Alberto Lopez DO;  Location: [facility];  Service: General     Social History   Social History     Substance and Sexual Activity   Alcohol Use Not Currently     Social History     Substance and Sexual Activity   Drug Use No     Social History     Tobacco Use   Smoking Status Former   Smokeless Tobacco Never     Family History:   Family History   Problem Relation Age of Onset    Heart disease Mother     Hypertension Mother     Diabetes Mother     No Known Problems Father     No Known Problems Maternal Grandmother     No Known Problems Maternal Grandfather     No Known Problems Paternal Grandmother     Prostate cancer Paternal Grandfather 70    No Known Problems Brother     No Known Problems Brother     No Known Problems Brother     No Known Problems Son     Breast cancer Maternal Aunt 70    Ovarian cancer Paternal Aunt     Endometrial cancer Paternal Aunt 36    No Known Problems Family     Colon cancer Neg Hx        Meds/Allergies   all current active meds have been reviewed, current meds:   Current Facility-Administered Medications   Medication Dose Route Frequency    acetaminophen (Ofirmev) injection 1,000 mg  1,000 mg Intravenous Q6H    ceftriaxone (ROCEPHIN) 1 g/50 mL in dextrose IVPB  1,000 mg Intravenous Q24H    fluconazole (DIFLUCAN)  IVPB (premix) 400 mg 200 mL  400 mg Intravenous Q24H    HYDROmorphone (DILAUDID) 1 mg/mL 50 mL PCA   Intravenous Continuous    HYDROmorphone (DILAUDID) injection 0.2 mg  0.2 mg Intravenous Q3H PRN    lactated ringers infusion  100 mL/hr Intravenous Continuous    metroNIDAZOLE (FLAGYL) IVPB (premix) 500 mg 100 mL  500 mg Intravenous Q8H    Multivitamin liquid 15 mL  15 mL Per NG Tube Daily    naloxone (NARCAN) 0.04 mg/mL syringe 0.04 mg  0.04 mg Intravenous Q1MIN PRN    ondansetron (ZOFRAN) injection 4 mg  4 mg Intravenous Q4H PRN    pantoprazole (PROTONIX) injection 40 mg  40 mg Intravenous Q12H SHELIA   , and PTA meds:   Prior to Admission Medications   Prescriptions Last Dose Informant Patient Reported? Taking?   Acetaminophen (TYLENOL ARTHRITIS PAIN PO) 7/10/2024 Self Yes Yes   Sig: Take by mouth   Multiple Vitamins-Minerals (CENTRUM SILVER PO) 7/10/2024 Self Yes Yes   Sig: Take by mouth   amLODIPine-benazepril (LOTREL 5-20) 5-20 MG per capsule 7/10/2024 Self Yes Yes   Sig: Take 1 capsule by mouth daily   aspirin 81 mg chewable tablet 7/9/2024 Self Yes Yes   Sig: Chew 81 mg daily   atorvastatin (LIPITOR) 20 mg tablet 7/10/2024 Self Yes Yes   gabapentin (NEURONTIN) 300 mg capsule 7/9/2024  No Yes   Sig: TAKE ONE CAPSULE BY MOUTH ONCE DAILY at bedtime   meloxicam (MOBIC) 15 mg tablet 7/10/2024 Self Yes Yes   spironolactone (ALDACTONE) 25 mg tablet 7/10/2024 Self Yes Yes      Facility-Administered Medications: None       Allergies   Allergen Reactions    Sulfa Antibiotics        Objective   Vitals:    07/11/24 0710 07/11/24 0736 07/11/24 0947 07/11/24 1139   BP:  113/69  117/78   BP Location:       Pulse:  87 78 83   Resp: 18 14  14   Temp:  98.2 °F (36.8 °C)  97.9 °F (36.6 °C)   TempSrc:       SpO2:  95% 95% 95%   Weight:       Height:             Intake/Output Summary (Last 24 hours) at 7/11/2024 1309  Last data filed at 7/11/2024 1122  Gross per 24 hour   Intake 805.93 ml   Output 1640 ml   Net -834.07 ml        Physical Exam  Vitals reviewed.   Constitutional:       General: She is not in acute distress.     Appearance: She is not ill-appearing (ngt).   Cardiovascular:      Rate and Rhythm: Normal rate.   Pulmonary:      Effort: Pulmonary effort is normal.   Chest:      Chest wall: No tenderness.   Abdominal:      Tenderness: There is abdominal tenderness.   Musculoskeletal:         General: Normal range of motion.      Cervical back: Normal range of motion.   Skin:     General: Skin is warm and dry.   Neurological:      Mental Status: She is alert and oriented to person, place, and time. Mental status is at baseline.   Psychiatric:         Mood and Affect: Mood normal.         Behavior: Behavior normal.           Lab Results:  Estimated Creatinine Clearance: 102.2 mL/min (by C-G formula based on SCr of 0.61 mg/dL).  Lab Results   Component Value Date    WBC 16.56 (H) 07/11/2024    WBC 6.12 03/15/2014    HGB 13.2 07/11/2024    HGB 14.1 03/15/2014    HCT 39.4 07/11/2024    HCT 42.5 03/15/2014     07/11/2024     03/15/2014         Component Value Date/Time     02/24/2014 1010    K 4.2 07/11/2024 0519    K 3.2 (L) 02/24/2014 1010     07/11/2024 0519     02/24/2014 1010    CO2 26 07/11/2024 0519    CO2 29 02/24/2014 1010    BUN 17 07/11/2024 0519    BUN 13 02/24/2014 1010    CREATININE 0.61 07/11/2024 0519    CREATININE 0.67 02/24/2014 1010         Component Value Date/Time    CALCIUM 8.5 07/11/2024 0519    CALCIUM 8.9 02/24/2014 1010    ALKPHOS 55 07/10/2024 0542    ALKPHOS 81 02/24/2014 1010    AST 21 07/10/2024 0542    AST 24 02/24/2014 1010    ALT 26 07/10/2024 0542    ALT 35 02/24/2014 1010    BILITOT 0.81 02/24/2014 1010    TP 6.7 07/10/2024 0542    ALB 4.2 07/10/2024 0542    ALB 3.9 02/24/2014 1010       Imaging Studies/EKG: I have personally reviewed pertinent reports.      Counseling / Coordination of Care  Total floor / unit time spent today 20 minutes. Greater than 50% of total  time was spent with the patient and / or family counseling and / or coordination of care. A description of the counseling / coordination of care: Patient interview, physical examination, review of PDMP, review of medical record, review of imaging and laboratory data, development of pain management plan, discussion of pain management plan with patient and primary service.      Please note that the APS provides consultative services regarding pain management only.  With the exception of ketamine and epidural infusions and except when indicated, final decisions regarding starting or changing doses of analgesic medications are at the discretion of the consulting service.  ALPHONSO Yoder  Acute Pain Service

## 2024-07-11 NOTE — PROGRESS NOTES
"Progress Note - Nidhi Jeffries 58 y.o. female MRN: [id number]    Unit/Bed#: S -01 Encounter: [id number]      Assessment/Plan:    #1.  Gastric submucosal mass near GE junction/lesser curvature, noted at the time of surgery yesterday for perforated peptic ulcer and Amor patch placement.  Will require FNA for biopsy; in view of recent surgery, will need to defer this for 6 to 8 weeks.  Suspect GIST or other submucosal tumor such as lipoma    -Follow-up on gastric biopsies for H. pylori, and treat if positive (first-line would be quadruple therapy)    -Twice daily PPI therapy    -Outpatient EUS with FNA in 6 to 8 weeks, will message our schedulers to have this arranged    -Continue avoidance of NSAIDs, tobacco and alcohol, and other gastric irritants    -NG tube and dietary advancement as per the surgical service      Subjective:   Patient has NG tube in place, says she feels relatively well, has some soreness in her abdomen which she attributes to recent surgery.  Last bowel movement was yesterday, reports periodically burping but not passing flatus.  No fevers or chills.    Objective:     Vitals: Blood pressure 113/69, pulse 87, temperature 98.2 °F (36.8 °C), resp. rate 14, height 5' 2\" (1.575 m), weight 89.8 kg (198 lb), last menstrual period 09/26/2020, SpO2 95%.,Body mass index is 36.21 kg/m².      Intake/Output Summary (Last 24 hours) at 7/11/2024 0905  Last data filed at 7/11/2024 0703  Gross per 24 hour   Intake 2355.93 ml   Output 1715 ml   Net 640.93 ml       Physical Exam:   General appearance: alert, NG tube in place with receptacle containing moderate amount of nonbloody appearing liquid material, appears stated age and cooperative    Lungs: clear to auscultation bilaterally, no labored breathing/accessory muscle use    Heart: regular rate and rhythm, S1, S2 normal, no murmur, click, rub or gallop    Abdomen: soft, gauze dressings noted over abdomen with no evidence of bleeding or seepage, has SYLVIA drain " in place with serosanguineous fluid noted there in, non-tender; bowel sounds normal; no masses,  no organomegaly    Extremities: no edema    Invasive Devices       Peripheral Intravenous Line  Duration             Peripheral IV 07/10/24 Left Antecubital 1 day    Peripheral IV 07/10/24 Right Antecubital 1 day    Peripheral IV 07/10/24 Right Hand 1 day              Drain  Duration             Urethral Catheter Latex 16 Fr. 1 day    Closed/Suction Drain Lateral;Right RLQ Bulb 19 Fr. <1 day    NG/OG/Enteral Tube Nasogastric 18 Fr Left nare <1 day                    Lab, Imaging and other studies: I have personally reviewed pertinent reports.    Admission on 07/10/2024   Component Date Value    WBC 07/10/2024 6.14     RBC 07/10/2024 5.14 (H)     Hemoglobin 07/10/2024 15.6 (H)     Hematocrit 07/10/2024 47.0 (H)     MCV 07/10/2024 91     MCH 07/10/2024 30.4     MCHC 07/10/2024 33.2     RDW 07/10/2024 12.6     MPV 07/10/2024 10.1     Platelets 07/10/2024 253     nRBC 07/10/2024 0     Segmented % 07/10/2024 55     Immature Grans % 07/10/2024 0     Lymphocytes % 07/10/2024 30     Monocytes % 07/10/2024 7     Eosinophils Relative 07/10/2024 6     Basophils Relative 07/10/2024 2 (H)     Absolute Neutrophils 07/10/2024 3.43     Absolute Immature Grans 07/10/2024 0.02     Absolute Lymphocytes 07/10/2024 1.85     Absolute Monocytes 07/10/2024 0.40     Eosinophils Absolute 07/10/2024 0.34     Basophils Absolute 07/10/2024 0.10     Sodium 07/10/2024 139     Potassium 07/10/2024 3.8     Chloride 07/10/2024 105     CO2 07/10/2024 28     ANION GAP 07/10/2024 6     BUN 07/10/2024 18     Creatinine 07/10/2024 0.70     Glucose 07/10/2024 140     Calcium 07/10/2024 9.0     AST 07/10/2024 21     ALT 07/10/2024 26     Alkaline Phosphatase 07/10/2024 55     Total Protein 07/10/2024 6.7     Albumin 07/10/2024 4.2     Total Bilirubin 07/10/2024 0.82     eGFR 07/10/2024 94     Lipase 07/10/2024 53     Protime 07/10/2024 12.9     INR 07/10/2024  0.91     PTT 07/10/2024 28     LACTIC ACID 07/10/2024 1.5     Blood Culture 07/10/2024 Received in Microbiology Lab. Culture in Progress.     Blood Culture 07/10/2024 Received in Microbiology Lab. Culture in Progress.     ABO Grouping 07/10/2024 O     Rh Factor 07/10/2024 Positive     Antibody Screen 07/10/2024 Negative     Specimen Expiration Date 07/10/2024 [id number]     ABO Grouping 07/10/2024 O     Rh Factor 07/10/2024 Positive     Sodium 07/11/2024 136     Potassium 07/11/2024 4.2     Chloride 07/11/2024 105     CO2 07/11/2024 26     ANION GAP 07/11/2024 5     BUN 07/11/2024 17     Creatinine 07/11/2024 0.61     Glucose 07/11/2024 127     Calcium 07/11/2024 8.5     eGFR 07/11/2024 98     Magnesium 07/11/2024 1.9     Phosphorus 07/11/2024 3.2     WBC 07/11/2024 16.56 (H)     RBC 07/11/2024 4.33     Hemoglobin 07/11/2024 13.2     Hematocrit 07/11/2024 39.4     MCV 07/11/2024 91     MCH 07/11/2024 30.5     MCHC 07/11/2024 33.5     RDW 07/11/2024 13.0     MPV 07/11/2024 10.5     Platelets 07/11/2024 199     nRBC 07/11/2024 0     Segmented % 07/11/2024 89 (H)     Immature Grans % 07/11/2024 1     Lymphocytes % 07/11/2024 5 (L)     Monocytes % 07/11/2024 5     Eosinophils Relative 07/11/2024 0     Basophils Relative 07/11/2024 0     Absolute Neutrophils 07/11/2024 14.79 (H)     Absolute Immature Grans 07/11/2024 0.12     Absolute Lymphocytes 07/11/2024 0.75     Absolute Monocytes 07/11/2024 0.86     Eosinophils Absolute 07/11/2024 0.00     Basophils Absolute 07/11/2024 0.04

## 2024-07-11 NOTE — PHYSICAL THERAPY NOTE
07/11/24 1119   Note Type   Note type Cancelled Session   Cancel Reasons Medical status   Additional Comments PT orders received and chart reviewed.  Attempted to see patient for PT session.  Patient was just returning back to bed with assist of nurse, previously using commode, unable to ambulate to bathroom per RN 2/2 to patient dizzy.  RN/patient requesting PT attempt patient later today.  Will follow as schedule permits.   Licensure   [place] License Number  Nicole Mchugh PT

## 2024-07-11 NOTE — OCCUPATIONAL THERAPY NOTE
Occupational Therapy Cancelled Session    Patient Name: Nidhi Jeffries  Today's Date: 7/11/2024 07/11/24 1431   Note Type   Note type Cancelled Session   Cancel Reasons Medical status   Additional Comments OT orders received and chart review performed. Pt admitted w/ abdominal pain. Now POD # 1 exlap, modified Amor patch repair. Per MILLER He, requesting therapy hold today due to medical status. Will continue to follow and see pt as appropriate and as schedule allows.     Janelle Fitzpatrick, OTD, OTR/L  [place] License [id number]  [place] License [id number]

## 2024-07-12 LAB
ANION GAP SERPL CALCULATED.3IONS-SCNC: 6 MMOL/L (ref 4–13)
BASOPHILS # BLD AUTO: 0.04 THOUSANDS/ÂΜL (ref 0–0.1)
BASOPHILS NFR BLD AUTO: 0 % (ref 0–1)
BUN SERPL-MCNC: 18 MG/DL (ref 5–25)
CALCIUM SERPL-MCNC: 8.3 MG/DL (ref 8.4–10.2)
CHLORIDE SERPL-SCNC: 104 MMOL/L (ref 96–108)
CO2 SERPL-SCNC: 24 MMOL/L (ref 21–32)
CREAT SERPL-MCNC: 0.57 MG/DL (ref 0.6–1.3)
EOSINOPHIL # BLD AUTO: 0 THOUSAND/ÂΜL (ref 0–0.61)
EOSINOPHIL NFR BLD AUTO: 0 % (ref 0–6)
ERYTHROCYTE [DISTWIDTH] IN BLOOD BY AUTOMATED COUNT: 13.1 % (ref 11.6–15.1)
GFR SERPL CREATININE-BSD FRML MDRD: 101 ML/MIN/1.73SQ M
GLUCOSE SERPL-MCNC: 99 MG/DL (ref 65–140)
HCT VFR BLD AUTO: 39.6 % (ref 34.8–46.1)
HGB BLD-MCNC: 13 G/DL (ref 11.5–15.4)
IMM GRANULOCYTES # BLD AUTO: 0.14 THOUSAND/UL (ref 0–0.2)
IMM GRANULOCYTES NFR BLD AUTO: 1 % (ref 0–2)
LYMPHOCYTES # BLD AUTO: 1.05 THOUSANDS/ÂΜL (ref 0.6–4.47)
LYMPHOCYTES NFR BLD AUTO: 6 % (ref 14–44)
MAGNESIUM SERPL-MCNC: 2.1 MG/DL (ref 1.9–2.7)
MCH RBC QN AUTO: 30.2 PG (ref 26.8–34.3)
MCHC RBC AUTO-ENTMCNC: 32.8 G/DL (ref 31.4–37.4)
MCV RBC AUTO: 92 FL (ref 82–98)
MONOCYTES # BLD AUTO: 0.77 THOUSAND/ÂΜL (ref 0.17–1.22)
MONOCYTES NFR BLD AUTO: 5 % (ref 4–12)
NEUTROPHILS # BLD AUTO: 14.52 THOUSANDS/ÂΜL (ref 1.85–7.62)
NEUTS SEG NFR BLD AUTO: 88 % (ref 43–75)
NRBC BLD AUTO-RTO: 0 /100 WBCS
PLATELET # BLD AUTO: 225 THOUSANDS/UL (ref 149–390)
PMV BLD AUTO: 10.7 FL (ref 8.9–12.7)
POTASSIUM SERPL-SCNC: 4.3 MMOL/L (ref 3.5–5.3)
RBC # BLD AUTO: 4.31 MILLION/UL (ref 3.81–5.12)
SODIUM SERPL-SCNC: 134 MMOL/L (ref 135–147)
WBC # BLD AUTO: 16.52 THOUSAND/UL (ref 4.31–10.16)

## 2024-07-12 PROCEDURE — 97116 GAIT TRAINING THERAPY: CPT

## 2024-07-12 PROCEDURE — 80048 BASIC METABOLIC PNL TOTAL CA: CPT

## 2024-07-12 PROCEDURE — 97163 PT EVAL HIGH COMPLEX 45 MIN: CPT

## 2024-07-12 PROCEDURE — 85025 COMPLETE CBC W/AUTO DIFF WBC: CPT

## 2024-07-12 PROCEDURE — 93005 ELECTROCARDIOGRAM TRACING: CPT

## 2024-07-12 PROCEDURE — 99232 SBSQ HOSP IP/OBS MODERATE 35: CPT

## 2024-07-12 PROCEDURE — 83735 ASSAY OF MAGNESIUM: CPT

## 2024-07-12 PROCEDURE — 97167 OT EVAL HIGH COMPLEX 60 MIN: CPT

## 2024-07-12 PROCEDURE — NC001 PR NO CHARGE: Performed by: SURGERY

## 2024-07-12 PROCEDURE — 97535 SELF CARE MNGMENT TRAINING: CPT

## 2024-07-12 RX ORDER — HEPARIN SODIUM 5000 [USP'U]/ML
5000 INJECTION, SOLUTION INTRAVENOUS; SUBCUTANEOUS EVERY 8 HOURS SCHEDULED
Status: DISCONTINUED | OUTPATIENT
Start: 2024-07-12 | End: 2024-07-17 | Stop reason: HOSPADM

## 2024-07-12 RX ADMIN — FLUCONAZOLE 400 MG: 2 INJECTION, SOLUTION INTRAVENOUS at 15:42

## 2024-07-12 RX ADMIN — ACETAMINOPHEN 1000 MG: 10 INJECTION INTRAVENOUS at 00:54

## 2024-07-12 RX ADMIN — ACETAMINOPHEN 1000 MG: 10 INJECTION INTRAVENOUS at 14:09

## 2024-07-12 RX ADMIN — PANTOPRAZOLE SODIUM 40 MG: 40 INJECTION, POWDER, FOR SOLUTION INTRAVENOUS at 08:08

## 2024-07-12 RX ADMIN — CEFTRIAXONE SODIUM 1000 MG: 10 INJECTION, POWDER, FOR SOLUTION INTRAVENOUS at 14:51

## 2024-07-12 RX ADMIN — HEPARIN SODIUM 5000 UNITS: 5000 INJECTION INTRAVENOUS; SUBCUTANEOUS at 06:50

## 2024-07-12 RX ADMIN — ACETAMINOPHEN 1000 MG: 10 INJECTION INTRAVENOUS at 08:08

## 2024-07-12 RX ADMIN — SODIUM CHLORIDE, SODIUM LACTATE, POTASSIUM CHLORIDE, AND CALCIUM CHLORIDE 100 ML/HR: .6; .31; .03; .02 INJECTION, SOLUTION INTRAVENOUS at 12:58

## 2024-07-12 RX ADMIN — HYDROMORPHONE HYDROCHLORIDE 0.2 MG: 1 INJECTION, SOLUTION INTRAMUSCULAR; INTRAVENOUS; SUBCUTANEOUS at 09:19

## 2024-07-12 RX ADMIN — METRONIDAZOLE 500 MG: 500 INJECTION, SOLUTION INTRAVENOUS at 18:16

## 2024-07-12 RX ADMIN — HEPARIN SODIUM 5000 UNITS: 5000 INJECTION INTRAVENOUS; SUBCUTANEOUS at 22:13

## 2024-07-12 RX ADMIN — METRONIDAZOLE 500 MG: 500 INJECTION, SOLUTION INTRAVENOUS at 02:11

## 2024-07-12 RX ADMIN — PANTOPRAZOLE SODIUM 40 MG: 40 INJECTION, POWDER, FOR SOLUTION INTRAVENOUS at 20:21

## 2024-07-12 RX ADMIN — HEPARIN SODIUM 5000 UNITS: 5000 INJECTION INTRAVENOUS; SUBCUTANEOUS at 14:09

## 2024-07-12 RX ADMIN — Medication 15 ML: at 08:08

## 2024-07-12 RX ADMIN — METRONIDAZOLE 500 MG: 500 INJECTION, SOLUTION INTRAVENOUS at 09:14

## 2024-07-12 RX ADMIN — ACETAMINOPHEN 1000 MG: 10 INJECTION INTRAVENOUS at 20:21

## 2024-07-12 RX ADMIN — SODIUM CHLORIDE, SODIUM LACTATE, POTASSIUM CHLORIDE, AND CALCIUM CHLORIDE 100 ML/HR: .6; .31; .03; .02 INJECTION, SOLUTION INTRAVENOUS at 22:42

## 2024-07-12 NOTE — PHYSICAL THERAPY NOTE
PHYSICAL THERAPY EVALUATION NOTE    Patient Name: Nidhi Jeffries  Today's Date: 7/12/2024  AGE:   58 y.o.  Mrn:   [id number]  ADMIT DX:  Acute gastric ulcer with perforation (HCC) [K25.1]  Abdominal pain [R10.9]    Past Medical History:   Diagnosis Date    Hypertension     Rheumatoid arthritis (HCC)      Length Of Stay: 2  PHYSICAL THERAPY EVALUATION :    07/12/24 1549   PT Last Visit   PT Visit Date 07/12/24   Pain Assessment   Pain Assessment Tool 0-10   Pain Score 5   Pain Location/Orientation Location: Abdomen   Hospital Pain Intervention(s) Repositioned;Ambulation/increased activity;Other (Comment)  (pt has use of PCA pump)   Restrictions/Precautions   Other Precautions Chair Alarm;Bed Alarm;Multiple lines;Fall Risk;Pain  (NPO)   Home Living   Type of Home House   Home Layout Two level;Other (Comment)  (1st floor bedroom (sleeping on the couch) and 2nd floor bathroom. 2 MADHURI.)   Additional Comments lives w/ family. ambulates w/o device. owns walker. independent w/ ADLs and IADLs. no falls in last 6 months. works full time.   General   Additional Pertinent History blood pressure 141/91. room air resting pulse ox 93% and 94 BPM, active 90% and 108 BPM.   Family/Caregiver Present No   Cognition   Overall Cognitive Status WFL   Arousal/Participation Alert   Attention Within functional limits   Orientation Level Oriented to person;Other (Comment)  (pt was identified w/ full name, birth date)   Following Commands Follows one step commands without difficulty   Comments moderate edema R UE   Subjective   Subjective pt seen supine in bed. agreed to PT eval. states having abdominal pain.   RUE Assessment   RUE Assessment WFL   LUE Assessment   LUE Assessment WFL   RLE Assessment   RLE Assessment WFL  (4- to 4/5)   LLE Assessment   LLE Assessment WFL  (4- to 4/5)   Vision-Basic Assessment   Current Vision Wears glasses all the time   Light Touch    RLE Light Touch Grossly intact   LLE Light Touch Grossly intact   Bed Mobility   Supine to Sit 4  Minimal assistance   Additional items Assist x 1;HOB elevated;Bedrails;Increased time required;Verbal cues;LE management  (for bedrail use)   Transfers   Sit to Stand 5  Supervision   Additional items Increased time required   Stand to Sit 5  Supervision   Additional items Increased time required   Ambulation/Elevation   Gait pattern Foward flexed;Short stride   Gait Assistance 5  Supervision   Additional items Verbal cues  (for walker positioning)   Assistive Device Rolling walker   Distance 40 feet  (additional ambulation not possible due to fatigue, pain)   Stair Management Assistance Not tested  (due to limited ambulation tolerance, safety concern)   Balance   Static Sitting Fair +   Static Standing Fair +  (w/ roller walker)   Ambulatory Fair -  (w/ roller walker)   Activity Tolerance   Activity Tolerance Patient limited by fatigue;Patient limited by pain   Nurse Made Aware spoke to Jigar Castro CM   Assessment   Problem List Decreased strength;Decreased endurance;Impaired balance;Decreased mobility;Decreased safety awareness;Pain   Assessment Pt presents with sudden abdominal pain. Dx: cute gastric ulcer with perforation. 7/10/24 exploratory laparotomy, leah patch repair of perforated pyloric ulcer. order placed for PT eval and tx, w/ activity order of up and out of bed as tolerated. pt presents w/ comorbidities of HTN, hyperlipidemia, spinal stenosis, and OA and personal factors of living in 2 story house and stair(s) to enter home. pt presents w/ pain, weakness, decreased endurance, impaired balance, gait deviations, decreased safety awareness, and fall risk. these impairments are evident in findings from physical examination (weakness), mobility assessment (need for standby to min assist w/ all phases of mobility when usually mobilizing independently, tolerance to only 40 feet of ambulation, and need  for cueing for mobility technique), and Barthel Index: 65/100. pt needed input for mobility technique. pt is at risk for falls due to physical and safety awareness deficits. pt's clinical presentation is unstable/unpredictable (evident in need for assist w/ all phases of mobility when usually mobilizing independently, tolerance to only 40 feet of ambulation, pain impacting overall mobility status, and need for input for mobility technique/safety). pt needs inpatient PT tx to improve mobility deficits and progress mobility training as appropriate.   Goals   Patient Goals I want to feel better. I want to go home.   STG Expiration Date 07/22/24   Short Term Goal #1 pt will: Increase bilateral LE strength 1/2 grade to facilitate independent mobility, Perform bed mobility independently to increase level of independence, Perform all transfers independently to improve independence, Ambulate 300 ft. with least restrictive assistive device independently w/o LOB to improve functional independence, Navigate 10 stair(s) independently with unilateral handrail to facilitate return to previous living environment, Increase ambulatory balance 1 grade to decrease risk for falls, Complete exercise program independently to increase strength and endurance, Tolerate 3 hr OOB to faciliate upright tolerance, Improve Barthel Index score to 85 or greater to facilitate independence, and Complete Timed Up and Go or Comfortable Gait Speed to further assess mobility and monitor progress   PT Treatment Day 1   Plan   Treatment/Interventions Functional transfer training;LE strengthening/ROM;Elevations;Therapeutic exercise;Endurance training;Gait training;Bed mobility;Equipment eval/education;Patient/family training   PT Frequency 3-5x/wk   Discharge Recommendation   Rehab Resource Intensity Level, PT III (Minimum Resource Intensity)   Equipment Recommended Walker   Walker Package Recommended Wheeled walker   Change/add to Walker Package? No    AM-PAC Basic Mobility Inpatient   Turning in Flat Bed Without Bedrails 4   Lying on Back to Sitting on Edge of Flat Bed Without Bedrails 3   Moving Bed to Chair 3   Standing Up From Chair Using Arms 3   Walk in Room 3   Climb 3-5 Stairs With Railing 1   Basic Mobility Inpatient Raw Score 17   Basic Mobility Standardized Score 39.67   [facility] Highest Level Of Mobility   -[facility] Goal 5: Stand one or more mins   -HL Achieved 7: Walk 25 feet or more   Barthel Index   Feeding 10   Bathing 0   Grooming Score 5   Dressing Score 5   Bladder Score 10   Bowels Score 10   Toilet Use Score 5   Transfers (Bed/Chair) Score 10   Mobility (Level Surface) Score 10   Stairs Score 0   Barthel Index Score 65   Additional Treatment Session   Start Time 1549   End Time 1559   Treatment Assessment Pt agreed to participate in PT intervention. Pt completed additional mobilization to address physical and mobility deficits noted during eval. Sit < - > stand transfer w/ supervision. Ambulated 70 feet x2 w/ roller walker w/ supervision. Standing rest break was required. Additional ambulation was not possible due to fatigue and pain. Therapist provided education to pt including walker management and transfer technique. Pt shows improvement w/ increased activity tolerance but continued to need the same level of assistance. Further inpatient PT intervention is necessary to decrease fall risk and maximize functional independence.   Equipment Use roller walker   Additional Treatment Day 1   End of Consult   Patient Position at End of Consult Bedside chair;Bed/Chair alarm activated;All needs within reach     The patient's AM-PAC Basic Mobility Inpatient Short Form Raw Score is 17. A Raw score of greater than 16 suggests the patient may benefit from discharge to home. Please also refer to the recommendation of the Physical Therapist for safe discharge planning.    Skilled PT recommended while in hospital and upon DC to progress pt toward  treatment goals.     Romero Perdomo, PT

## 2024-07-12 NOTE — PROGRESS NOTES
"Progress Note - General Surgery  Nidhi Jeffries 58 y.o. female MRN: [id number]  Unit/Bed#: S -01 Encounter: [id number]    Assessment:  58 y.o. female with a perforated gastric ulcer, is now 2 Days Post-Op s/p Procedure(s) (LRB):  LAPAROTOMY EXPLORATORY, MODIFIED KYRIE PATCH (N/A)    Plan:  - Diet NPO; Ice chips  - Pain and Nausea control PRN  - Continue PCA while NGT in place  - Incentive spirometry  - OOB, ambulate  -Continue NGT to suction  - Plan for UGI study on 7/15  - Will potentially remove NGT on 7/15 pending results of UGI  - Continue to monitor bowel function  -Avoid NSAIDs  - Continue ceftriaxone/Flagyl/fluconazole until 7/15  - Please message the General surgery resident role with any concerns    Subjective/Objective     Subjective: Feels somewhat better this morning.  Denies any nausea or emesis.  Has not yet had return of bowel function.      Objective:   Vitals: Blood pressure 118/80, pulse 71, temperature 98.4 °F (36.9 °C), resp. rate 16, height 5' 2\" (1.575 m), weight 89.8 kg (198 lb), last menstrual period 09/26/2020, SpO2 93%.,Body mass index is 36.21 kg/m².    I/O         07/10 0701  07/11 0700 07/11 0701  07/12 0700    I.V. (mL/kg) 1899.3 (21.2) 987.6 (11)    IV Piggyback 1100     Total Intake(mL/kg) 2999.3 (33.4) 987.6 (11)    Urine (mL/kg/hr) 1500 (0.7) 1500 (0.7)    Emesis/NG output 250 0    Drains 115 125    Blood 100     Total Output 1965 1625    Net +1034.3 -637.4                  Physical Exam:  Gen: NAD, Aox3, Comfortable in bed  Chest: Normal work of breathing, no respiratory distress  Abd: Soft, ND, appropriately tender.  SYLVIA drain with 125 cc serosanguineous output. Surgical sites are clean, dry, and intact with staples in place  Ext: No Edema  Skin: Warm, Dry, Intact      Lab, Imaging and other studies: I have personally reviewed pertinent reports.  , CBC with diff: No results found for: \"WBC\", \"HGB\", \"HCT\", \"MCV\", \"PLT\", \"ADJUSTEDWBC\", \"RBC\", \"MCH\", \"MCHC\", \"RDW\", \"MPV\", \"NRBC\", " "BMP/CMP: No results found for: \"SODIUM\", \"K\", \"CL\", \"CO2\", \"ANIONGAP\", \"BUN\", \"CREATININE\", \"GLUCOSE\", \"CALCIUM\", \"AST\", \"ALT\", \"ALKPHOS\", \"PROT\", \"BILITOT\", \"EGFR\"  VTE Pharmacologic Prophylaxis: Heparin  VTE Mechanical Prophylaxis: sequential compression device        Chris Mak MD  7/12/2024  6:31 AM      "

## 2024-07-12 NOTE — PLAN OF CARE
Problem: PAIN - ADULT  Goal: Verbalizes/displays adequate comfort level or baseline comfort level  Description: Interventions:  - Encourage patient to monitor pain and request assistance  - Assess pain using appropriate pain scale  - Administer analgesics based on type and severity of pain and evaluate response  - Implement non-pharmacological measures as appropriate and evaluate response  - Consider cultural and social influences on pain and pain management  - Notify physician/advanced practitioner if interventions unsuccessful or patient reports new pain  Outcome: Progressing     Problem: INFECTION - ADULT  Goal: Absence or prevention of progression during hospitalization  Description: INTERVENTIONS:  - Assess and monitor for signs and symptoms of infection  - Monitor lab/diagnostic results  - Monitor all insertion sites, i.e. indwelling lines, tubes, and drains  - Monitor endotracheal if appropriate and nasal secretions for changes in amount and color  - [place] appropriate cooling/warming therapies per order  - Administer medications as ordered  - Instruct and encourage patient and family to use good hand hygiene technique  - Identify and instruct in appropriate isolation precautions for identified infection/condition  Outcome: Progressing  Goal: Absence of fever/infection during neutropenic period  Description: INTERVENTIONS:  - Monitor WBC    Outcome: Progressing     Problem: SAFETY ADULT  Goal: Patient will remain free of falls  Description: INTERVENTIONS:  - Educate patient/family on patient safety including physical limitations  - Instruct patient to call for assistance with activity   - Consult OT/PT to assist with strengthening/mobility   - Keep Call bell within reach  - Keep bed low and locked with side rails adjusted as appropriate  - Keep care items and personal belongings within reach  - Initiate and maintain comfort rounds  - Make Fall Risk Sign visible to staff  - Apply yellow socks and bracelet  for high fall risk patients  - Consider moving patient to room near nurses station  Outcome: Progressing  Goal: Maintain or return to baseline ADL function  Description: INTERVENTIONS:  -  Assess patient's ability to carry out ADLs; assess patient's baseline for ADL function and identify physical deficits which impact ability to perform ADLs (bathing, care of mouth/teeth, toileting, grooming, dressing, etc.)  - Assess/evaluate cause of self-care deficits   - Assess range of motion  - Assess patient's mobility; develop plan if impaired  - Assess patient's need for assistive devices and provide as appropriate  - Encourage maximum independence but intervene and supervise when necessary  - Involve family in performance of ADLs  - Assess for home care needs following discharge   - Consider OT consult to assist with ADL evaluation and planning for discharge  - Provide patient education as appropriate  Outcome: Progressing  Goal: Maintains/Returns to pre admission functional level  Description: INTERVENTIONS:  - Perform AM-PAC 6 Click Basic Mobility/ Daily Activity assessment daily.  - Set and communicate daily mobility goal to care team and patient/family/caregiver.   - Collaborate with rehabilitation services on mobility goals if consulted  - Out of bed for toileting  - Record patient progress and toleration of activity level   Outcome: Progressing     Problem: DISCHARGE PLANNING  Goal: Discharge to home or other facility with appropriate resources  Description: INTERVENTIONS:  - Identify barriers to discharge w/patient and caregiver  - Arrange for needed discharge resources and transportation as appropriate  - Identify discharge learning needs (meds, wound care, etc.)  - Arrange for interpretive services to assist at discharge as needed  - Refer to Case Management Department for coordinating discharge planning if the patient needs post-hospital services based on physician/advanced practitioner order or complex needs  related to functional status, cognitive ability, or social support system  Outcome: Progressing     Problem: Knowledge Deficit  Goal: Patient/family/caregiver demonstrates understanding of disease process, treatment plan, medications, and discharge instructions  Description: Complete learning assessment and assess knowledge base.  Interventions:  - Provide teaching at level of understanding  - Provide teaching via preferred learning methods  Outcome: Progressing

## 2024-07-12 NOTE — CASE MANAGEMENT
Case Management Assessment    Patient name Nidhi Jeffries  Location S /S -01 MRN [id number]  : 1965 Date 2024       Current Admission Date: 7/10/2024  Current Admission Diagnosis:Lumbar stenosis with neurogenic claudication   Patient Active Problem List    Diagnosis Date Noted Date Diagnosed    S/P exploratory laparotomy 2024     Bilateral carpal tunnel syndrome 2023     Paresthesia of hand, bilateral 2023     Bilateral hand pain 2023     Intervertebral disc disorder with radiculopathy of lumbar region      Postmenopausal bleeding 10/23/2019     Low libido 10/23/2019     Generalized osteoarthritis 2019     Lumbar stenosis with neurogenic claudication 2018     Hyperlipidemia 2017     Lump, breast 2015     Essential hypertension 10/07/2013       LOS (days): 2  Geometric Mean LOS (GMLOS) (days):   Days to GMLOS:     OBJECTIVE:    Risk of Unplanned Readmission Score: 7.78         Current admission status: Inpatient       Preferred Pharmacy:   [id number] - [place], [place] - [address]  [address]  [place] [address]  Phone: [phone] Fax: [phone]    Primary Care Provider: Arnaud Mariee DO    Primary Insurance: AETNA  Secondary Insurance:     ASSESSMENT:  Active Health Care Proxies    There are no active Health Care Proxies on file.                 Readmission Root Cause  30 Day Readmission: No    Patient Information  Admitted from:: Home  Mental Status: Alert  During Assessment patient was accompanied by: Not accompanied during assessment  Assessment information provided by:: Patient  Support Systems: Family members  Home entry access options. Select all that apply.: Stairs  Number of steps to enter home.: 1  Do the steps have railings?: Yes  Type of Current Residence: [address] [place] home  Upon entering residence, is there a bedroom on the main floor (no further steps)?: No  A bedroom is located on the following  floor levels of residence (select all that apply):: 2nd Floor  Upon entering residence, is there a bathroom on the main floor (no further steps)?: No  Indicate which floors of current residence have a bathroom (select all the apply):: 2nd Floor  Number of steps to 2nd floor from main floor: One Flight  Living Arrangements: Lives w/ Spouse/significant other, Lives w/ Extended Family    Activities of Daily Living Prior to Admission  Functional Status: Independent  Completes ADLs independently?: Yes  Ambulates independently?: Yes  Does patient use assisted devices?: No  Does patient currently own DME?: Yes  What DME does the patient currently own?: Walker (RW was her mother's)  Does patient have a history of Outpatient Therapy (PT/OT)?: Yes  Does the patient have a history of Short-Term Rehab?: No  Does patient have a history of HHC?: No  Does patient currently have HHC?: No         Patient Information Continued  Income Source: Employed  Does patient have prescription coverage?: Yes  Does patient receive dialysis treatments?: No  Does patient have a history of substance abuse?: No  Does patient have a history of Mental Health Diagnosis?: No         Means of Transportation  Means of Transport to Appts:: Drives Self      Social Determinants of Health (SDOH)      Flowsheet Row Most Recent Value   Housing Stability    In the last 12 months, was there a time when you were not able to pay the mortgage or rent on time? N   At any time in the past 12 months, were you homeless or living in a shelter (including now)? N   Transportation Needs    In the past 12 months, has lack of transportation kept you from medical appointments or from getting medications? no   In the past 12 months, has lack of transportation kept you from meetings, work, or from getting things needed for daily living? No   Food Insecurity    Within the past 12 months, you worried that your food would run out before you got the money to buy more. Never true    Within the past 12 months, the food you bought just didn't last and you didn't have money to get more. Never true   Utilities    In the past 12 months has the electric, gas, oil, or water company threatened to shut off services in your home? No          Per discussion with therapy, patient may need HHC vs outpatient services. CC informed if HHC is what is recommended, then she would be in agreement.

## 2024-07-12 NOTE — OCCUPATIONAL THERAPY NOTE
"  Occupational Therapy Evaluation/Treatment       07/12/24 1030   OT Last Visit   OT Visit Date 07/12/24   Note Type   Note type Evaluation   Pain Assessment   Pain Assessment Tool 0-10   Pain Score 1  (at rest, worse with movement/ambulation)   Pain Location/Orientation Location: Abdomen   Restrictions/Precautions   Weight Bearing Precautions Per Order No   Other Precautions Chair Alarm;Bed Alarm;Fall Risk;Pain;Multiple lines  (SYLVIA drain, dPCA pump)   Home Living   Type of Home House   Home Layout Two level;Other (Comment)  (bathroom upstairs, pt reports sleeping in recliner on first floor)   Bathroom Shower/Tub Tub/shower unit   Bathroom Toilet Standard   Prior Function   Level of [place] Independent with ADLs;Independent with functional mobility;Independent with IADLS   Lives With Son;Other (Comment)  (mother)   Receives Help From Other (Comment)  (pt reports son works and her mother is home but has \"her own issues\" and would not be able to provide assistance)   IADLs Independent with driving;Independent with meal prep;Independent with medication management   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)   General   Additional Pertinent History Pt seen POD #2 exlap, modified Amor patch repair   Family/Caregiver Present No   Subjective   Subjective Pt agreeable to OT evaluation   ADL   Eating Assistance Unable to assess   Eating Deficit NPO   Grooming Assistance 5  Supervision/Setup   UB Bathing Assistance 5  Supervision/Setup   LB Bathing Assistance 3  Moderate Assistance   UB Dressing Assistance 4  Minimal Assistance   LB Dressing Assistance 3  Moderate Assistance   Toileting Assistance  3  Moderate Assistance   Bed Mobility   Supine to Sit 4  Minimal assistance   Additional items Assist x 1;Verbal cues;Increased time required   Sit to Supine Unable to assess  (transferred to bedside chair)   Transfers   Sit to Stand 4  Minimal assistance   Additional items Assist x 1;Verbal " cues;Increased time required   Stand to Sit 4  Minimal assistance   Additional items Assist x 1;Verbal cues;Increased time required   Toilet transfer 4  Minimal assistance   Additional items Assist x 1;Verbal cues;Increased time required;Standard toilet   Functional Mobility   Functional Mobility 4  Minimal assistance   Additional Comments engaged in short distance functional mobility using RW and min A   Additional items Rolling walker   Balance   Static Sitting Fair +   Dynamic Sitting Fair   Static Standing Fair   Dynamic Standing Fair -   Activity Tolerance   Activity Tolerance Patient limited by fatigue;Patient limited by pain   RUE Assessment   RUE Assessment   (at least 3+/5 as seen during functional assessment)   LUE Assessment   LUE Assessment   (at least 3+/5 as seen during functional assessment)   Cognition   Overall Cognitive Status WFL   Arousal/Participation Alert;Cooperative   Attention Within functional limits   Orientation Level Oriented X4   Memory Within functional limits   Following Commands Follows all commands and directions without difficulty   Assessment   Limitation Decreased ADL status;Decreased UE strength;Decreased endurance;Decreased self-care trans;Decreased high-level ADLs  (decreased balance and mobility)   Prognosis Good   Assessment Patient evaluated by Occupational Therapy.  Patient admitted with <principal problem not specified>.  The patients occupational profile, medical and therapy history includes a extensive additional review of physical, cognitive, or psychosocial history related to current functional performance.  Comorbidities affecting functional mobility and ADLS include: RA, HTN.  Prior to admission, patient was independent with functional mobility without assistive device, independent with ADLS, and independent with IADLS.  The evaluation identifies the following performance deficits: weakness, impaired balance, decreased endurance, increased fall risk, new onset of  impairment of functional mobility, decreased ADLS, decreased IADLS, pain, decreased activity tolerance, and decreased strength, that result in activity limitations and/or participation restrictions. This evaluation requires clinical decision making of high complexity, because the patient presents with comorbidites that affect occupational performance and required significant modification of tasks or assistance with consideration of multiple treatment options. The patient's raw score on the -PAC Daily Activity Inpatient Short Form is 16. A raw score of less than 19 suggests the patient may benefit from discharge to post-acute rehabilitation services. Please refer to the recommendation of the Occupational Therapist for safe discharge planning.  Patient will benefit from skilled Occupational Therapy services to address above deficits and facilitate a safe return to prior level of function.   Goals   Patient Goals to get better   LTG Time Frame 10-14   Long Term Goal see goals below   Plan   Treatment Interventions ADL retraining;Functional transfer training;UE strengthening/ROM;Endurance training;Patient/family training;Equipment evaluation/education;Activityengagement;Energy conservation   Goal Expiration Date 07/26/24   OT Frequency 3-5x/wk   Discharge Recommendation   Rehab Resource Intensity Level, OT II (Moderate Resource Intensity)  (pending progress and medical optimization may become appropriate for level 3 minimum resource intensity)   AM-PAC Daily Activity Inpatient   Lower Body Dressing 2   Bathing 2   Toileting 2   Upper Body Dressing 3   Grooming 3   Eating 4   Daily Activity Raw Score 16   Daily Activity Standardized Score (Calc for Raw Score >=11) 35.96   AM-PAC Applied Cognition Inpatient   Following a Speech/Presentation 4   Understanding Ordinary Conversation 4   Taking Medications 4   Remembering Where Things Are Placed or Put Away 4   Remembering List of 4-5 Errands 4   Taking Care of Complicated  Tasks 4   Applied Cognition Raw Score 24   Applied Cognition Standardized Score 62.21   Additional Treatment Session   Start Time 1020   End Time 1030   Treatment Assessment S: 1/10 pain. O: Completed standard toilet transfer using RW and grab bar with min A. Pt requiring mod A for posterior hygiene and clothing mgmt. Standing at sink to complete hand hygiene with supervision. Additional fxl mobility using RW and min A, +time. Sit<>stand with min A +time. A: Pt tolerated OT treatment session fairly well. Pt is generally weak and deconditioned impacting fxl performance. P: Pt would benefit from cont OT services to maximize safety and fxl performance of ADLs.   Licensure   [place] License Number  Lydia Kowalski OTR/L [id number]     GOALS:    Pt will achieve the following goals within 10 days.     *Pt will complete UB bathing and dressing with mod I.     *Pt will complete LB bathing and dressing with mod I.     * Pt will complete toileting w/ mod I w/ G hygiene/thoroughness using DME PRN.     *Pt will complete bed mobility with mod I, with bed flat and no side rail to prep for purposeful tasks.     *Pt will perform functional transfers with on/off all surfaces with mod I using DME as needed w/ G balance/safety.     *Pt will increase standing tolerance to 5 minutes for inc'd tolerance with standing purposeful tasks.     *Pt will increase static/dynamic sitting balance to good to improve the ability to sit at edge of bed or on a chair for ADLS.       *Pt will increase static/dynamic standing balance to fair + to improve postural stability and decrease fall risk during standing ADLS and transfers.        *Pt will verbalize 3 safety awareness/ principles to prevent falls in the home setting.      *Pt will improve functional mobility during ADL/IADL/leisure tasks to mod I using DME as needed w/ G balance/safety.     *Assess DME needs.      Lydia Meghana OTR/L [id number]

## 2024-07-12 NOTE — PLAN OF CARE
Problem: PHYSICAL THERAPY ADULT  Goal: Performs mobility at highest level of function for planned discharge setting.  See evaluation for individualized goals.  Description: Treatment/Interventions: Functional transfer training, LE strengthening/ROM, Elevations, Therapeutic exercise, Endurance training, Gait training, Bed mobility, Equipment eval/education, Patient/family training  Equipment Recommended: Walker       See flowsheet documentation for full assessment, interventions and recommendations.  Note:    Problem List: Decreased strength, Decreased endurance, Impaired balance, Decreased mobility, Decreased safety awareness, Pain  Assessment: Pt presents with sudden abdominal pain. Dx: cute gastric ulcer with perforation. 7/10/24 exploratory laparotomy, leah patch repair of perforated pyloric ulcer. order placed for PT eval and tx, w/ activity order of up and out of bed as tolerated. pt presents w/ comorbidities of HTN, hyperlipidemia, spinal stenosis, and OA and personal factors of living in 2 story house and stair(s) to enter home. pt presents w/ pain, weakness, decreased endurance, impaired balance, gait deviations, decreased safety awareness, and fall risk. these impairments are evident in findings from physical examination (weakness), mobility assessment (need for standby to min assist w/ all phases of mobility when usually mobilizing independently, tolerance to only 40 feet of ambulation, and need for cueing for mobility technique), and Barthel Index: 65/100. pt needed input for mobility technique. pt is at risk for falls due to physical and safety awareness deficits. pt's clinical presentation is unstable/unpredictable (evident in need for assist w/ all phases of mobility when usually mobilizing independently, tolerance to only 40 feet of ambulation, pain impacting overall mobility status, and need for input for mobility technique/safety). pt needs inpatient PT tx to improve mobility deficits and  progress mobility training as appropriate.        Rehab Resource Intensity Level, PT: III (Minimum Resource Intensity)    See flowsheet documentation for full assessment.

## 2024-07-12 NOTE — PROGRESS NOTES
Progress Note - Acute Pain Service    Nidhi Jeffries 58 y.o. female MRN: [id number]  Unit/Bed#: S -01 Encounter: [id number]      Nidhi Jeffries is a 58 y.o. female with past medical history of rheumatoid arthritis, GERD, hypertension, spinal stenosis who presented to the emergency department with a sudden onset right upper quadrant abdominal pain. She was found to have a perforated pyloric ulcer and is now status post exploratory laparotomy, modified Amor patch repair 7/10/2024. She received bilateral rectus sheath blocks with Exparel for postoperative analgesia. APS consulted for postoperative block follow-up.     Seen at bedside this morning sitting up in chair at bedside. Abdominal pain relatively well-controlled with dPCA pump; does report exacerbation of pain with movement/ambulation. Remains NPO with NGT in place and no return of bowel function as of yet.     S/P exploratory laparotomy  Assessment & Plan  Presented to the emergency department 7/10/2024 with new onset right upper quadrant abdominal pain  Found to have perforated pyloric ulcer  Now status post ex lap, modified Amor patch  Remains n.p.o. with NGT in place  Received bilateral rectus sheath blocks with Exparel 7/10 for postop analgesia  Resolved appropriately, patient denies any residual sensory deficit and pain continues to be well-controlled    Multimodal analgesia:  Continue Dilaudid PCA pump at current settings:  Continuous rate 0, PCA dose 0.2, lockout 5 minutes, hour limit 2.2 mg  Modify as needed IV Tylenol to 1000 mg every 6 hours scheduled  Narcan as needed for respiratory depression/opioid reversal    Once NGT is removed and diet is advanced recommend transitioning to primarily oral multimodal regimen    Lumbar stenosis with neurogenic claudication  Assessment & Plan  Currently follows with [facility] spine [facility] pain (Dr. Robles) for management of chronic back pain in setting of spinal stenosis.  Home regimen includes gabapentin 300 mg  daily at bedtime and Tylenol as needed      Patient may continue the current analgesic regimen as outlined above. Once NGT is removed and no longer NPO would recommend transition to primarily oral multimodal regimen.     APS will sign off at this time. Thank you for the consult. All opioids and other analgesics to be written at discretion of primary team. Please contact Acute Pain Service - via [employer] from [phone] with additional questions or concerns. See [employer] or [employer] (Closed)on for additional contacts and after hours information.    Pain History  Current pain location(s):  Pain Score: 8  Pain Location/Orientation: Location: Abdomen  Pain Scale: Pain Assessment Tool: 0-10  24 hour history: no acute events overnight. Remains hemodynamically stable tolerating current MMA and denies any opioid induced side effect.    Opioid requirement previous 24 hours:   dPCA use: 9 attempts, 5 doses used.     Meds/Allergies   all current active meds have been reviewed, current meds:   Current Facility-Administered Medications   Medication Dose Route Frequency    acetaminophen (Ofirmev) injection 1,000 mg  1,000 mg Intravenous Q6H    benzocaine (HURRICAINE) 20 % mucosal spray 2 spray  2 spray Mucosal Q6H PRN    ceftriaxone (ROCEPHIN) 1 g/50 mL in dextrose IVPB  1,000 mg Intravenous Q24H    fluconazole (DIFLUCAN) IVPB (premix) 400 mg 200 mL  400 mg Intravenous Q24H    heparin (porcine) subcutaneous injection 5,000 Units  5,000 Units Subcutaneous Q8H SHELIA    HYDROmorphone (DILAUDID) 1 mg/mL 50 mL PCA   Intravenous Continuous    HYDROmorphone (DILAUDID) injection 0.2 mg  0.2 mg Intravenous Q3H PRN    lactated ringers infusion  100 mL/hr Intravenous Continuous    metroNIDAZOLE (FLAGYL) IVPB (premix) 500 mg 100 mL  500 mg Intravenous Q8H    Multivitamin liquid 15 mL  15 mL Per NG Tube Daily    naloxone (NARCAN) 0.04 mg/mL syringe 0.04 mg  0.04 mg Intravenous Q1MIN PRN    ondansetron (ZOFRAN) injection 4 mg  4 mg Intravenous Q4H PRN     pantoprazole (PROTONIX) injection 40 mg  40 mg Intravenous Q12H SHELIA   , and PTA meds:   Prior to Admission Medications   Prescriptions Last Dose Informant Patient Reported? Taking?   Acetaminophen (TYLENOL ARTHRITIS PAIN PO) 7/10/2024 Self Yes Yes   Sig: Take by mouth   Multiple Vitamins-Minerals (CENTRUM SILVER PO) 7/10/2024 Self Yes Yes   Sig: Take by mouth   amLODIPine-benazepril (LOTREL 5-20) 5-20 MG per capsule 7/10/2024 Self Yes Yes   Sig: Take 1 capsule by mouth daily   aspirin 81 mg chewable tablet 7/9/2024 Self Yes Yes   Sig: Chew 81 mg daily   atorvastatin (LIPITOR) 20 mg tablet 7/10/2024 Self Yes Yes   gabapentin (NEURONTIN) 300 mg capsule 7/9/2024  No Yes   Sig: TAKE ONE CAPSULE BY MOUTH ONCE DAILY at bedtime   meloxicam (MOBIC) 15 mg tablet 7/10/2024 Self Yes Yes   spironolactone (ALDACTONE) 25 mg tablet 7/10/2024 Self Yes Yes      Facility-Administered Medications: None       Allergies   Allergen Reactions    Sulfa Antibiotics        Objective        Vitals:    07/12/24 0248 07/12/24 0752 07/12/24 0752 07/12/24 1112   BP: 118/80 135/87 135/87 129/83   Pulse: 71   83   Resp:  14  14   Temp:  98.4 °F (36.9 °C) 98.4 °F (36.9 °C) 98.1 °F (36.7 °C)   TempSrc:       SpO2: 93%   93%   Weight:       Height:             Physical Exam  Vitals reviewed.   Constitutional:       General: She is not in acute distress.     Appearance: She is not ill-appearing (ngt).   Cardiovascular:      Rate and Rhythm: Normal rate.   Pulmonary:      Effort: Pulmonary effort is normal.   Chest:      Chest wall: No tenderness.   Abdominal:      Tenderness: There is abdominal tenderness.   Musculoskeletal:         General: Normal range of motion.      Cervical back: Normal range of motion.   Skin:     General: Skin is warm and dry.   Neurological:      Mental Status: She is alert and oriented to person, place, and time. Mental status is at baseline.   Psychiatric:         Mood and Affect: Mood normal.         Behavior: Behavior  normal.         Lab Results:   Estimated Creatinine Clearance: 109.4 mL/min (A) (by C-G formula based on SCr of 0.57 mg/dL (L)).  Lab Results   Component Value Date    WBC 16.52 (H) 07/12/2024    WBC 6.12 03/15/2014    HGB 13.0 07/12/2024    HGB 14.1 03/15/2014    HCT 39.6 07/12/2024    HCT 42.5 03/15/2014     07/12/2024     03/15/2014         Component Value Date/Time     02/24/2014 1010    K 4.3 07/12/2024 0617    K 3.2 (L) 02/24/2014 1010     07/12/2024 0617     02/24/2014 1010    CO2 24 07/12/2024 0617    CO2 29 02/24/2014 1010    BUN 18 07/12/2024 0617    BUN 13 02/24/2014 1010    CREATININE 0.57 (L) 07/12/2024 0617    CREATININE 0.67 02/24/2014 1010         Component Value Date/Time    CALCIUM 8.3 (L) 07/12/2024 0617    CALCIUM 8.9 02/24/2014 1010    ALKPHOS 55 07/10/2024 0542    ALKPHOS 81 02/24/2014 1010    AST 21 07/10/2024 0542    AST 24 02/24/2014 1010    ALT 26 07/10/2024 0542    ALT 35 02/24/2014 1010    BILITOT 0.81 02/24/2014 1010    TP 6.7 07/10/2024 0542    ALB 4.2 07/10/2024 0542    ALB 3.9 02/24/2014 1010       Imaging Studies/EKG: I have personally reviewed pertinent reports.           Please note that the APS provides consultative services regarding pain management only.  With the exception of ketamine and epidural infusions and except when indicated, final decisions regarding starting or changing doses of analgesic medications are at the discretion of the consulting service.    ALPHONSO Yoder   Acute Pain Service

## 2024-07-12 NOTE — PROGRESS NOTES
"General Surgery  Progress Note   Nidhi Jeffries 58 y.o. female MRN: [id number]  Unit/Bed#: S -01 Encounter: [id number]    Assessment:  58 y.o. female with a perforated gastric ulcer, s/p Ex-lap Modified Amor patch repair 7/10.    Plan:  -NPO  -NGT remains till 7/15  -Plan for UGI study on 7/15   -Continue PCA while NGT in place   -Protonix  -Continue ceftriaxone/Flagyl/fluconazole until 7/15   -Continue to monitor bowel function   -Monitor SYLVIA drain output  -Pain/ nausea control PRN  -OOB/ ambulation  -Incentive Spirometry  -Please message the General surgery resident role with any concerns      Subjective/Objective     Subjective:   Patient seen and examined at bedside, in no acute distress. No acute events overnight. No BM, passing flatus. Has been OOB to chair. No nausea, vomiting, fevers, chills, chest pain or sob.      Objective:   Vitals:Blood pressure 146/94, pulse 97, temperature 98 °F (36.7 °C), resp. rate 16, height 5' 2\" (1.575 m), weight 89.8 kg (198 lb), last menstrual period 2020, SpO2 92%.  Temp (24hrs), Av.2 °F (36.8 °C), Min:97.8 °F (36.6 °C), Max:98.5 °F (36.9 °C)      I/O         07/10 0701  / 0700  0701   0700  0701   0700    I.V. (mL/kg) 1899.3 (21.2) 987.6 (11)     NG/GT   45    IV Piggyback 1100      Total Intake(mL/kg) 2999.3 (33.4) 987.6 (11) 45 (0.5)    Urine (mL/kg/hr) 1500 (0.7) 1500 (0.7) 400 (2)    Emesis/NG output 250 0     Drains 115 125 50    Blood 100      Total Output 1965 1625 450    Net +1034.3 -637.4 -405                   Invasive Devices       Peripheral Intravenous Line  Duration             Peripheral IV 07/10/24 Left Antecubital 3 days    Peripheral IV 07/10/24 Right Antecubital 3 days              Drain  Duration             Closed/Suction Drain Lateral;Right RLQ Bulb 19 Fr. 2 days    NG/OG/Enteral Tube Nasogastric 18 Fr Left nare 2 days                    Physical Exam:  GEN: NAD  HEENT: MMM, NGT in place  CV: well perfused RR  Lung: " Normal effort  Ab: Soft, ND, tender near midline incision, SYLVIA drain with serosanguinous output  Extrem: No CCE   Neuro: A+Ox3     Lab Results   Component Value Date    WBC 13.87 (H) 07/13/2024    HGB 13.8 07/13/2024    HCT 41.1 07/13/2024    MCV 90 07/13/2024     07/13/2024      Lab Results   Component Value Date     02/24/2014    SODIUM 134 (L) 07/12/2024    K 4.3 07/12/2024     07/12/2024    CO2 24 07/12/2024    ANIONGAP 6 02/24/2014    AGAP 6 07/12/2024    BUN 18 07/12/2024    CREATININE 0.57 (L) 07/12/2024    GLUC 99 07/12/2024    GLUF 94 08/11/2022    CALCIUM 8.3 (L) 07/12/2024    AST 21 07/10/2024    ALT 26 07/10/2024    ALKPHOS 55 07/10/2024    PROT 7.1 02/24/2014    TP 6.7 07/10/2024    BILITOT 0.81 02/24/2014    TBILI 0.82 07/10/2024    EGFR 101 07/12/2024       VTE Prophylaxis: Heparin        Aramis Hoffman MD  7/13/2024  8:00 AM

## 2024-07-13 LAB
ANION GAP SERPL CALCULATED.3IONS-SCNC: 10 MMOL/L (ref 4–13)
BASOPHILS # BLD AUTO: 0.05 THOUSANDS/ÂΜL (ref 0–0.1)
BASOPHILS NFR BLD AUTO: 0 % (ref 0–1)
BUN SERPL-MCNC: 11 MG/DL (ref 5–25)
CALCIUM SERPL-MCNC: 9 MG/DL (ref 8.4–10.2)
CHLORIDE SERPL-SCNC: 104 MMOL/L (ref 96–108)
CO2 SERPL-SCNC: 24 MMOL/L (ref 21–32)
CREAT SERPL-MCNC: 0.46 MG/DL (ref 0.6–1.3)
EOSINOPHIL # BLD AUTO: 0.16 THOUSAND/ÂΜL (ref 0–0.61)
EOSINOPHIL NFR BLD AUTO: 1 % (ref 0–6)
ERYTHROCYTE [DISTWIDTH] IN BLOOD BY AUTOMATED COUNT: 13 % (ref 11.6–15.1)
GFR SERPL CREATININE-BSD FRML MDRD: 108 ML/MIN/1.73SQ M
GLUCOSE SERPL-MCNC: 85 MG/DL (ref 65–140)
HCT VFR BLD AUTO: 41.1 % (ref 34.8–46.1)
HGB BLD-MCNC: 13.8 G/DL (ref 11.5–15.4)
IMM GRANULOCYTES # BLD AUTO: 0.08 THOUSAND/UL (ref 0–0.2)
IMM GRANULOCYTES NFR BLD AUTO: 1 % (ref 0–2)
LYMPHOCYTES # BLD AUTO: 2.11 THOUSANDS/ÂΜL (ref 0.6–4.47)
LYMPHOCYTES NFR BLD AUTO: 15 % (ref 14–44)
MCH RBC QN AUTO: 30.3 PG (ref 26.8–34.3)
MCHC RBC AUTO-ENTMCNC: 33.6 G/DL (ref 31.4–37.4)
MCV RBC AUTO: 90 FL (ref 82–98)
MONOCYTES # BLD AUTO: 0.71 THOUSAND/ÂΜL (ref 0.17–1.22)
MONOCYTES NFR BLD AUTO: 5 % (ref 4–12)
NEUTROPHILS # BLD AUTO: 10.76 THOUSANDS/ÂΜL (ref 1.85–7.62)
NEUTS SEG NFR BLD AUTO: 78 % (ref 43–75)
NRBC BLD AUTO-RTO: 0 /100 WBCS
PLATELET # BLD AUTO: 244 THOUSANDS/UL (ref 149–390)
PMV BLD AUTO: 10.2 FL (ref 8.9–12.7)
POTASSIUM SERPL-SCNC: 3.6 MMOL/L (ref 3.5–5.3)
RBC # BLD AUTO: 4.56 MILLION/UL (ref 3.81–5.12)
SODIUM SERPL-SCNC: 138 MMOL/L (ref 135–147)
WBC # BLD AUTO: 13.87 THOUSAND/UL (ref 4.31–10.16)

## 2024-07-13 PROCEDURE — 85025 COMPLETE CBC W/AUTO DIFF WBC: CPT | Performed by: SURGERY

## 2024-07-13 PROCEDURE — 99024 POSTOP FOLLOW-UP VISIT: CPT | Performed by: SURGERY

## 2024-07-13 PROCEDURE — 80048 BASIC METABOLIC PNL TOTAL CA: CPT | Performed by: SURGERY

## 2024-07-13 RX ORDER — DEXTROSE MONOHYDRATE, SODIUM CHLORIDE, AND POTASSIUM CHLORIDE 50; 1.49; 4.5 G/1000ML; G/1000ML; G/1000ML
50 INJECTION, SOLUTION INTRAVENOUS CONTINUOUS
Status: DISCONTINUED | OUTPATIENT
Start: 2024-07-13 | End: 2024-07-16

## 2024-07-13 RX ORDER — POTASSIUM CHLORIDE 14.9 MG/ML
20 INJECTION INTRAVENOUS
Status: COMPLETED | OUTPATIENT
Start: 2024-07-13 | End: 2024-07-13

## 2024-07-13 RX ADMIN — HEPARIN SODIUM 5000 UNITS: 5000 INJECTION INTRAVENOUS; SUBCUTANEOUS at 21:00

## 2024-07-13 RX ADMIN — METRONIDAZOLE 500 MG: 500 INJECTION, SOLUTION INTRAVENOUS at 02:08

## 2024-07-13 RX ADMIN — PANTOPRAZOLE SODIUM 40 MG: 40 INJECTION, POWDER, FOR SOLUTION INTRAVENOUS at 08:36

## 2024-07-13 RX ADMIN — PANTOPRAZOLE SODIUM 40 MG: 40 INJECTION, POWDER, FOR SOLUTION INTRAVENOUS at 20:53

## 2024-07-13 RX ADMIN — METRONIDAZOLE 500 MG: 500 INJECTION, SOLUTION INTRAVENOUS at 10:56

## 2024-07-13 RX ADMIN — Medication 15 ML: at 14:13

## 2024-07-13 RX ADMIN — DEXTROSE, SODIUM CHLORIDE, AND POTASSIUM CHLORIDE 100 ML/HR: 5; .45; .15 INJECTION INTRAVENOUS at 08:38

## 2024-07-13 RX ADMIN — POTASSIUM CHLORIDE 20 MEQ: 14.9 INJECTION, SOLUTION INTRAVENOUS at 11:43

## 2024-07-13 RX ADMIN — FLUCONAZOLE 400 MG: 2 INJECTION, SOLUTION INTRAVENOUS at 18:32

## 2024-07-13 RX ADMIN — HEPARIN SODIUM 5000 UNITS: 5000 INJECTION INTRAVENOUS; SUBCUTANEOUS at 06:21

## 2024-07-13 RX ADMIN — ACETAMINOPHEN 1000 MG: 10 INJECTION INTRAVENOUS at 20:53

## 2024-07-13 RX ADMIN — HEPARIN SODIUM 5000 UNITS: 5000 INJECTION INTRAVENOUS; SUBCUTANEOUS at 14:10

## 2024-07-13 RX ADMIN — POTASSIUM CHLORIDE 20 MEQ: 14.9 INJECTION, SOLUTION INTRAVENOUS at 14:09

## 2024-07-13 RX ADMIN — METRONIDAZOLE 500 MG: 500 INJECTION, SOLUTION INTRAVENOUS at 19:07

## 2024-07-13 RX ADMIN — ACETAMINOPHEN 1000 MG: 10 INJECTION INTRAVENOUS at 08:36

## 2024-07-13 RX ADMIN — ACETAMINOPHEN 1000 MG: 10 INJECTION INTRAVENOUS at 01:52

## 2024-07-13 RX ADMIN — CEFTRIAXONE SODIUM 1000 MG: 10 INJECTION, POWDER, FOR SOLUTION INTRAVENOUS at 16:35

## 2024-07-13 NOTE — PLAN OF CARE
Problem: PAIN - ADULT  Goal: Verbalizes/displays adequate comfort level or baseline comfort level  Description: Interventions:  - Encourage patient to monitor pain and request assistance  - Assess pain using appropriate pain scale  - Administer analgesics based on type and severity of pain and evaluate response  - Implement non-pharmacological measures as appropriate and evaluate response  - Consider cultural and social influences on pain and pain management  - Notify physician/advanced practitioner if interventions unsuccessful or patient reports new pain  Outcome: Progressing     Problem: INFECTION - ADULT  Goal: Absence or prevention of progression during hospitalization  Description: INTERVENTIONS:  - Assess and monitor for signs and symptoms of infection  - Monitor lab/diagnostic results  - Monitor all insertion sites, i.e. indwelling lines, tubes, and drains  - Monitor endotracheal if appropriate and nasal secretions for changes in amount and color  - [place] appropriate cooling/warming therapies per order  - Administer medications as ordered  - Instruct and encourage patient and family to use good hand hygiene technique  - Identify and instruct in appropriate isolation precautions for identified infection/condition  Outcome: Progressing  Goal: Absence of fever/infection during neutropenic period  Description: INTERVENTIONS:  - Monitor WBC    Outcome: Progressing     Problem: SAFETY ADULT  Goal: Patient will remain free of falls  Description: INTERVENTIONS:  - Educate patient/family on patient safety including physical limitations  - Instruct patient to call for assistance with activity   - Consult OT/PT to assist with strengthening/mobility   - Keep Call bell within reach  - Keep bed low and locked with side rails adjusted as appropriate  - Keep care items and personal belongings within reach  - Initiate and maintain comfort rounds  - Make Fall Risk Sign visible to staff  - Apply yellow socks and bracelet  for high fall risk patients  - Consider moving patient to room near nurses station  Outcome: Progressing  Goal: Maintain or return to baseline ADL function  Description: INTERVENTIONS:  -  Assess patient's ability to carry out ADLs; assess patient's baseline for ADL function and identify physical deficits which impact ability to perform ADLs (bathing, care of mouth/teeth, toileting, grooming, dressing, etc.)  - Assess/evaluate cause of self-care deficits   - Assess range of motion  - Assess patient's mobility; develop plan if impaired  - Assess patient's need for assistive devices and provide as appropriate  - Encourage maximum independence but intervene and supervise when necessary  - Involve family in performance of ADLs  - Assess for home care needs following discharge   - Consider OT consult to assist with ADL evaluation and planning for discharge  - Provide patient education as appropriate  Outcome: Progressing  Goal: Maintains/Returns to pre admission functional level  Description: INTERVENTIONS:  - Perform AM-PAC 6 Click Basic Mobility/ Daily Activity assessment daily.  - Set and communicate daily mobility goal to care team and patient/family/caregiver.   - Collaborate with rehabilitation services on mobility goals if consulted  - Out of bed for toileting  - Record patient progress and toleration of activity level   Outcome: Progressing     Problem: DISCHARGE PLANNING  Goal: Discharge to home or other facility with appropriate resources  Description: INTERVENTIONS:  - Identify barriers to discharge w/patient and caregiver  - Arrange for needed discharge resources and transportation as appropriate  - Identify discharge learning needs (meds, wound care, etc.)  - Arrange for interpretive services to assist at discharge as needed  - Refer to Case Management Department for coordinating discharge planning if the patient needs post-hospital services based on physician/advanced practitioner order or complex needs  related to functional status, cognitive ability, or social support system  Outcome: Progressing     Problem: Knowledge Deficit  Goal: Patient/family/caregiver demonstrates understanding of disease process, treatment plan, medications, and discharge instructions  Description: Complete learning assessment and assess knowledge base.  Interventions:  - Provide teaching at level of understanding  - Provide teaching via preferred learning methods  Outcome: Progressing

## 2024-07-13 NOTE — PROGRESS NOTES
"Progress Note  Nidhi Jeffries 58 y.o. female MRN: [id number]  Unit/Bed#: S -01 Encounter: [id number]    Assessment  58F with perforated pyloric ulcer, now s/p 7/10 exlap, modified Amor patch repair.     Plan  - NPO/NGT til 7/15, plan for 7/15 UGI  - mIVF@100  - continue PCA while NGT in place   - PPI bid  - ctx/flagyl/fluconazole til 7/15   - DVT ppx    Subjective/Objective   NAOE. Pain controlled. No n/v. No BM, passing flatus. Voiding. Walking.    VSS on RA.  /84   Pulse 67   Temp 98 °F (36.7 °C)   Resp 16   Ht 5' 2\" (1.575 m)   Wt 89.8 kg (198 lb)   LMP 09/26/2020 (Exact Date)   SpO2 96%   BMI 36.21 kg/m²     Physical Exam:  General: NAD  CV: nl rate  Lungs: nl wob. No resp distress.  ABD: Soft, ND, minimal RUQ tenderness, CDI. NGT, SYLVIA in place  Extrem: No CCE    NGT: 350cc gastric  SYLVIA: 100cc ss  UOP: 4500cc    7/10 bcx: NG@72h  Recent Labs     07/12/24  0741 07/13/24  0732 07/14/24  0514   WBC 16.52* 13.87* 9.79   HGB 13.0 13.8 15.5*    244 296   SODIUM  --  138 135   K  --  3.6 3.8   CL  --  104 103   CO2  --  24 25   BUN  --  11 10   CREATININE  --  0.46* 0.51*   GLUC  --  85 131   CALCIUM  --  9.0 9.4   MG 2.1  --   --    EGFR  --  108 104       "

## 2024-07-14 LAB
ANION GAP SERPL CALCULATED.3IONS-SCNC: 7 MMOL/L (ref 4–13)
ATRIAL RATE: 84 BPM
ATRIAL RATE: 89 BPM
BASOPHILS # BLD AUTO: 0.09 THOUSANDS/ÂΜL (ref 0–0.1)
BASOPHILS NFR BLD AUTO: 1 % (ref 0–1)
BUN SERPL-MCNC: 10 MG/DL (ref 5–25)
CALCIUM SERPL-MCNC: 9.4 MG/DL (ref 8.4–10.2)
CHLORIDE SERPL-SCNC: 103 MMOL/L (ref 96–108)
CO2 SERPL-SCNC: 25 MMOL/L (ref 21–32)
CREAT SERPL-MCNC: 0.51 MG/DL (ref 0.6–1.3)
EOSINOPHIL # BLD AUTO: 0.33 THOUSAND/ÂΜL (ref 0–0.61)
EOSINOPHIL NFR BLD AUTO: 3 % (ref 0–6)
ERYTHROCYTE [DISTWIDTH] IN BLOOD BY AUTOMATED COUNT: 12.9 % (ref 11.6–15.1)
GFR SERPL CREATININE-BSD FRML MDRD: 104 ML/MIN/1.73SQ M
GLUCOSE SERPL-MCNC: 131 MG/DL (ref 65–140)
HCT VFR BLD AUTO: 45.7 % (ref 34.8–46.1)
HGB BLD-MCNC: 15.5 G/DL (ref 11.5–15.4)
IMM GRANULOCYTES # BLD AUTO: 0.08 THOUSAND/UL (ref 0–0.2)
IMM GRANULOCYTES NFR BLD AUTO: 1 % (ref 0–2)
LYMPHOCYTES # BLD AUTO: 1.97 THOUSANDS/ÂΜL (ref 0.6–4.47)
LYMPHOCYTES NFR BLD AUTO: 20 % (ref 14–44)
MCH RBC QN AUTO: 30.2 PG (ref 26.8–34.3)
MCHC RBC AUTO-ENTMCNC: 33.9 G/DL (ref 31.4–37.4)
MCV RBC AUTO: 89 FL (ref 82–98)
MONOCYTES # BLD AUTO: 0.66 THOUSAND/ÂΜL (ref 0.17–1.22)
MONOCYTES NFR BLD AUTO: 7 % (ref 4–12)
NEUTROPHILS # BLD AUTO: 6.66 THOUSANDS/ÂΜL (ref 1.85–7.62)
NEUTS SEG NFR BLD AUTO: 68 % (ref 43–75)
NRBC BLD AUTO-RTO: 0 /100 WBCS
P AXIS: 36 DEGREES
P AXIS: 40 DEGREES
PLATELET # BLD AUTO: 296 THOUSANDS/UL (ref 149–390)
PMV BLD AUTO: 9.8 FL (ref 8.9–12.7)
POTASSIUM SERPL-SCNC: 3.8 MMOL/L (ref 3.5–5.3)
PR INTERVAL: 158 MS
PR INTERVAL: 160 MS
QRS AXIS: -2 DEGREES
QRS AXIS: 0 DEGREES
QRSD INTERVAL: 82 MS
QRSD INTERVAL: 84 MS
QT INTERVAL: 356 MS
QT INTERVAL: 384 MS
QTC INTERVAL: 433 MS
QTC INTERVAL: 453 MS
RBC # BLD AUTO: 5.14 MILLION/UL (ref 3.81–5.12)
SODIUM SERPL-SCNC: 135 MMOL/L (ref 135–147)
T WAVE AXIS: 23 DEGREES
T WAVE AXIS: 29 DEGREES
VENTRICULAR RATE: 84 BPM
VENTRICULAR RATE: 89 BPM
WBC # BLD AUTO: 9.79 THOUSAND/UL (ref 4.31–10.16)

## 2024-07-14 PROCEDURE — 85025 COMPLETE CBC W/AUTO DIFF WBC: CPT

## 2024-07-14 PROCEDURE — 99024 POSTOP FOLLOW-UP VISIT: CPT | Performed by: SURGERY

## 2024-07-14 PROCEDURE — 93010 ELECTROCARDIOGRAM REPORT: CPT | Performed by: INTERNAL MEDICINE

## 2024-07-14 PROCEDURE — 80048 BASIC METABOLIC PNL TOTAL CA: CPT

## 2024-07-14 RX ORDER — POTASSIUM CHLORIDE 14.9 MG/ML
20 INJECTION INTRAVENOUS
Status: COMPLETED | OUTPATIENT
Start: 2024-07-14 | End: 2024-07-14

## 2024-07-14 RX ADMIN — Medication 15 ML: at 08:51

## 2024-07-14 RX ADMIN — POTASSIUM CHLORIDE 20 MEQ: 14.9 INJECTION, SOLUTION INTRAVENOUS at 10:55

## 2024-07-14 RX ADMIN — POTASSIUM CHLORIDE 20 MEQ: 14.9 INJECTION, SOLUTION INTRAVENOUS at 08:48

## 2024-07-14 RX ADMIN — ACETAMINOPHEN 1000 MG: 10 INJECTION INTRAVENOUS at 07:59

## 2024-07-14 RX ADMIN — ACETAMINOPHEN 1000 MG: 10 INJECTION INTRAVENOUS at 19:26

## 2024-07-14 RX ADMIN — FLUCONAZOLE 400 MG: 2 INJECTION, SOLUTION INTRAVENOUS at 13:59

## 2024-07-14 RX ADMIN — DEXTROSE, SODIUM CHLORIDE, AND POTASSIUM CHLORIDE 100 ML/HR: 5; .45; .15 INJECTION INTRAVENOUS at 08:34

## 2024-07-14 RX ADMIN — PANTOPRAZOLE SODIUM 40 MG: 40 INJECTION, POWDER, FOR SOLUTION INTRAVENOUS at 20:26

## 2024-07-14 RX ADMIN — ACETAMINOPHEN 1000 MG: 10 INJECTION INTRAVENOUS at 13:59

## 2024-07-14 RX ADMIN — DEXTROSE, SODIUM CHLORIDE, AND POTASSIUM CHLORIDE 100 ML/HR: 5; .45; .15 INJECTION INTRAVENOUS at 00:24

## 2024-07-14 RX ADMIN — HEPARIN SODIUM 5000 UNITS: 5000 INJECTION INTRAVENOUS; SUBCUTANEOUS at 13:31

## 2024-07-14 RX ADMIN — HEPARIN SODIUM 5000 UNITS: 5000 INJECTION INTRAVENOUS; SUBCUTANEOUS at 05:19

## 2024-07-14 RX ADMIN — METRONIDAZOLE 500 MG: 500 INJECTION, SOLUTION INTRAVENOUS at 02:34

## 2024-07-14 RX ADMIN — METRONIDAZOLE 500 MG: 500 INJECTION, SOLUTION INTRAVENOUS at 10:56

## 2024-07-14 RX ADMIN — CEFTRIAXONE SODIUM 1000 MG: 10 INJECTION, POWDER, FOR SOLUTION INTRAVENOUS at 13:29

## 2024-07-14 RX ADMIN — HEPARIN SODIUM 5000 UNITS: 5000 INJECTION INTRAVENOUS; SUBCUTANEOUS at 20:26

## 2024-07-14 RX ADMIN — TOPICAL ANESTHETIC 2 SPRAY: 200 SPRAY DENTAL; PERIODONTAL at 11:02

## 2024-07-14 RX ADMIN — METRONIDAZOLE 500 MG: 500 INJECTION, SOLUTION INTRAVENOUS at 19:26

## 2024-07-14 RX ADMIN — ACETAMINOPHEN 1000 MG: 10 INJECTION INTRAVENOUS at 02:16

## 2024-07-14 RX ADMIN — PANTOPRAZOLE SODIUM 40 MG: 40 INJECTION, POWDER, FOR SOLUTION INTRAVENOUS at 07:59

## 2024-07-14 NOTE — PROGRESS NOTES
"General Surgery  Progress Note   Nidhi Jeffries 58 y.o. female MRN: [id number]  Unit/Bed#: S -01 Encounter: [id number]    Assessment:  58 y.o. female with a perforated gastric ulcer, s/p Ex-lap Modified Amor patch repair 7/10.     Plan:  -NPO/NGT  -UGI today to evaluate for leak, if no leak remove NGT  -Continue PCA while NGT in place   -PPI bid  -Ctx/flagyl/fluconazole falls off 7/15  -DVT ppx  -OOB/ ambulation  -Incentive Spirometry  -Please message the General surgery resident role with any concerns      Subjective/Objective     Subjective:   Patient seen and examined at bedside, in no acute distress. No acute events overnight.  Patient denies pain.  Patient is passing flatus but no bowel movements.  Patient has been out of bed to chair.  Patient has been ambulating to the bathroom in her room.  No nausea vomiting fevers chills chest pain or shortness of breath.    Objective:   Vitals:Blood pressure 146/81, pulse 79, temperature 97.5 °F (36.4 °C), resp. rate 18, height 5' 2\" (1.575 m), weight 89.8 kg (198 lb), last menstrual period 2020, SpO2 95%.  Temp (24hrs), Av.7 °F (36.5 °C), Min:97.5 °F (36.4 °C), Max:98.3 °F (36.8 °C)      I/O          07 0700  07 0700  0701  07/15 0700    I.V. (mL/kg) 3.4 (0) 1579.3 (17.6)     NG/GT 45 40     Total Intake(mL/kg) 48.4 (0.5) 1619.3 (18)     Urine (mL/kg/hr) 5000 (2.3) 4500 (2.1) 825 (9.2)    Emesis/NG output 450 350     Drains 160 100     Total Output 5610 4950 825    Net -5561.6 -3330.7 -825                   Invasive Devices       Peripheral Intravenous Line  Duration             Peripheral IV 24 Right;Ventral (anterior) Wrist 1 day    Peripheral IV 24 Proximal;Right;Ventral (anterior) Forearm <1 day              Drain  Duration             Closed/Suction Drain Lateral;Right RLQ Bulb 19 Fr. 4 days    NG/OG/Enteral Tube Nasogastric 18 Fr Left nare 4 days                    Physical Exam:  GEN: NAD  HEENT: MMM, NGT " in place with gastric output  CV: well perfused RR  Lung: Normal effort  Ab: Soft, ND, NT, SYLVIA drain with serous output, incisions clean dry and intact  Extrem: No CCE   Neuro: A+Ox3     Lab Results   Component Value Date    WBC 9.79 07/14/2024    HGB 15.5 (H) 07/14/2024    HCT 45.7 07/14/2024    MCV 89 07/14/2024     07/14/2024      Lab Results   Component Value Date     02/24/2014    SODIUM 135 07/14/2024    K 3.8 07/14/2024     07/14/2024    CO2 25 07/14/2024    ANIONGAP 6 02/24/2014    AGAP 7 07/14/2024    BUN 10 07/14/2024    CREATININE 0.51 (L) 07/14/2024    GLUC 131 07/14/2024    GLUF 94 08/11/2022    CALCIUM 9.4 07/14/2024    AST 21 07/10/2024    ALT 26 07/10/2024    ALKPHOS 55 07/10/2024    PROT 7.1 02/24/2014    TP 6.7 07/10/2024    BILITOT 0.81 02/24/2014    TBILI 0.82 07/10/2024    EGFR 104 07/14/2024       VTE Prophylaxis: Heparin        Aramis Hoffman MD  7/15/2024  4:56 AM

## 2024-07-14 NOTE — PLAN OF CARE
Problem: PAIN - ADULT  Goal: Verbalizes/displays adequate comfort level or baseline comfort level  Description: Interventions:  - Encourage patient to monitor pain and request assistance  - Assess pain using appropriate pain scale  - Administer analgesics based on type and severity of pain and evaluate response  - Implement non-pharmacological measures as appropriate and evaluate response  - Consider cultural and social influences on pain and pain management  - Notify physician/advanced practitioner if interventions unsuccessful or patient reports new pain  Outcome: Progressing     Problem: INFECTION - ADULT  Goal: Absence or prevention of progression during hospitalization  Description: INTERVENTIONS:  - Assess and monitor for signs and symptoms of infection  - Monitor lab/diagnostic results  - Monitor all insertion sites, i.e. indwelling lines, tubes, and drains  - Monitor endotracheal if appropriate and nasal secretions for changes in amount and color  - [place] appropriate cooling/warming therapies per order  - Administer medications as ordered  - Instruct and encourage patient and family to use good hand hygiene technique  - Identify and instruct in appropriate isolation precautions for identified infection/condition  Outcome: Progressing     Problem: Nutrition/Hydration-ADULT  Goal: Nutrient/Hydration intake appropriate for improving, restoring or maintaining nutritional needs  Description: Monitor and assess patient's nutrition/hydration status for malnutrition. Collaborate with interdisciplinary team and initiate plan and interventions as ordered.  Monitor patient's weight and dietary intake as ordered or per policy. Utilize nutrition screening tool and intervene as necessary. Determine patient's food preferences and provide high-protein, high-caloric foods as appropriate.     INTERVENTIONS:  - Monitor oral intake, urinary output, labs, and treatment plans  - Assess nutrition and hydration status and  recommend course of action  - Evaluate amount of meals eaten  - Assist patient with eating if necessary   - Allow adequate time for meals  - Recommend/ encourage appropriate diets, oral nutritional supplements, and vitamin/mineral supplements  - Order, calculate, and assess calorie counts as needed  - Recommend, monitor, and adjust tube feedings and TPN/PPN based on assessed needs  - Assess need for intravenous fluids  - Provide specific nutrition/hydration education as appropriate  - Include patient/family/caregiver in decisions related to nutrition  Outcome: Progressing

## 2024-07-15 ENCOUNTER — APPOINTMENT (INPATIENT)
Dept: RADIOLOGY | Facility: HOSPITAL | Age: 59
DRG: 326 | End: 2024-07-15
Payer: COMMERCIAL

## 2024-07-15 LAB
ANION GAP SERPL CALCULATED.3IONS-SCNC: 7 MMOL/L (ref 4–13)
BACTERIA BLD CULT: NORMAL
BACTERIA BLD CULT: NORMAL
BASOPHILS # BLD AUTO: 0.13 THOUSANDS/ÂΜL (ref 0–0.1)
BASOPHILS NFR BLD AUTO: 1 % (ref 0–1)
BUN SERPL-MCNC: 10 MG/DL (ref 5–25)
CALCIUM SERPL-MCNC: 8.9 MG/DL (ref 8.4–10.2)
CHLORIDE SERPL-SCNC: 103 MMOL/L (ref 96–108)
CO2 SERPL-SCNC: 25 MMOL/L (ref 21–32)
CREAT SERPL-MCNC: 0.58 MG/DL (ref 0.6–1.3)
EOSINOPHIL # BLD AUTO: 0.43 THOUSAND/ÂΜL (ref 0–0.61)
EOSINOPHIL NFR BLD AUTO: 5 % (ref 0–6)
ERYTHROCYTE [DISTWIDTH] IN BLOOD BY AUTOMATED COUNT: 13 % (ref 11.6–15.1)
GFR SERPL CREATININE-BSD FRML MDRD: 100 ML/MIN/1.73SQ M
GLUCOSE SERPL-MCNC: 130 MG/DL (ref 65–140)
HCT VFR BLD AUTO: 43.6 % (ref 34.8–46.1)
HGB BLD-MCNC: 14.7 G/DL (ref 11.5–15.4)
IMM GRANULOCYTES # BLD AUTO: 0.18 THOUSAND/UL (ref 0–0.2)
IMM GRANULOCYTES NFR BLD AUTO: 2 % (ref 0–2)
LYMPHOCYTES # BLD AUTO: 2.24 THOUSANDS/ÂΜL (ref 0.6–4.47)
LYMPHOCYTES NFR BLD AUTO: 25 % (ref 14–44)
MCH RBC QN AUTO: 30.2 PG (ref 26.8–34.3)
MCHC RBC AUTO-ENTMCNC: 33.7 G/DL (ref 31.4–37.4)
MCV RBC AUTO: 90 FL (ref 82–98)
MONOCYTES # BLD AUTO: 0.8 THOUSAND/ÂΜL (ref 0.17–1.22)
MONOCYTES NFR BLD AUTO: 9 % (ref 4–12)
NEUTROPHILS # BLD AUTO: 5.36 THOUSANDS/ÂΜL (ref 1.85–7.62)
NEUTS SEG NFR BLD AUTO: 58 % (ref 43–75)
NRBC BLD AUTO-RTO: 0 /100 WBCS
PLATELET # BLD AUTO: 283 THOUSANDS/UL (ref 149–390)
PMV BLD AUTO: 9.6 FL (ref 8.9–12.7)
POTASSIUM SERPL-SCNC: 3.7 MMOL/L (ref 3.5–5.3)
RBC # BLD AUTO: 4.87 MILLION/UL (ref 3.81–5.12)
SODIUM SERPL-SCNC: 135 MMOL/L (ref 135–147)
WBC # BLD AUTO: 9.14 THOUSAND/UL (ref 4.31–10.16)

## 2024-07-15 PROCEDURE — 97110 THERAPEUTIC EXERCISES: CPT

## 2024-07-15 PROCEDURE — 74240 X-RAY XM UPR GI TRC 1CNTRST: CPT

## 2024-07-15 PROCEDURE — 97116 GAIT TRAINING THERAPY: CPT

## 2024-07-15 PROCEDURE — 85025 COMPLETE CBC W/AUTO DIFF WBC: CPT

## 2024-07-15 PROCEDURE — 99024 POSTOP FOLLOW-UP VISIT: CPT | Performed by: SURGERY

## 2024-07-15 PROCEDURE — 80048 BASIC METABOLIC PNL TOTAL CA: CPT

## 2024-07-15 RX ORDER — ACETAMINOPHEN 325 MG/1
975 TABLET ORAL EVERY 6 HOURS SCHEDULED
Status: DISCONTINUED | OUTPATIENT
Start: 2024-07-15 | End: 2024-07-17 | Stop reason: HOSPADM

## 2024-07-15 RX ORDER — POTASSIUM CHLORIDE 14.9 MG/ML
20 INJECTION INTRAVENOUS ONCE
Status: COMPLETED | OUTPATIENT
Start: 2024-07-15 | End: 2024-07-15

## 2024-07-15 RX ORDER — POTASSIUM CHLORIDE 20 MEQ/1
40 TABLET, EXTENDED RELEASE ORAL ONCE
Status: DISCONTINUED | OUTPATIENT
Start: 2024-07-15 | End: 2024-07-15

## 2024-07-15 RX ORDER — HYDROMORPHONE HCL/PF 1 MG/ML
0.2 SYRINGE (ML) INJECTION
Status: DISCONTINUED | OUTPATIENT
Start: 2024-07-15 | End: 2024-07-17 | Stop reason: HOSPADM

## 2024-07-15 RX ORDER — LANOLIN ALCOHOL/MO/W.PET/CERES
3 CREAM (GRAM) TOPICAL
Status: DISCONTINUED | OUTPATIENT
Start: 2024-07-15 | End: 2024-07-17 | Stop reason: HOSPADM

## 2024-07-15 RX ORDER — OXYCODONE HYDROCHLORIDE 5 MG/1
5 TABLET ORAL EVERY 4 HOURS PRN
Status: DISCONTINUED | OUTPATIENT
Start: 2024-07-15 | End: 2024-07-17 | Stop reason: HOSPADM

## 2024-07-15 RX ADMIN — HEPARIN SODIUM 5000 UNITS: 5000 INJECTION INTRAVENOUS; SUBCUTANEOUS at 05:02

## 2024-07-15 RX ADMIN — FLUCONAZOLE 400 MG: 2 INJECTION, SOLUTION INTRAVENOUS at 15:09

## 2024-07-15 RX ADMIN — IOHEXOL 75 ML: 350 INJECTION, SOLUTION INTRAVENOUS at 13:00

## 2024-07-15 RX ADMIN — DEXTROSE, SODIUM CHLORIDE, AND POTASSIUM CHLORIDE 100 ML/HR: 5; .45; .15 INJECTION INTRAVENOUS at 04:56

## 2024-07-15 RX ADMIN — ACETAMINOPHEN 975 MG: 325 TABLET, FILM COATED ORAL at 18:22

## 2024-07-15 RX ADMIN — ACETAMINOPHEN 1000 MG: 10 INJECTION INTRAVENOUS at 14:28

## 2024-07-15 RX ADMIN — ACETAMINOPHEN 1000 MG: 10 INJECTION INTRAVENOUS at 01:55

## 2024-07-15 RX ADMIN — PANTOPRAZOLE SODIUM 40 MG: 40 INJECTION, POWDER, FOR SOLUTION INTRAVENOUS at 09:10

## 2024-07-15 RX ADMIN — HEPARIN SODIUM 5000 UNITS: 5000 INJECTION INTRAVENOUS; SUBCUTANEOUS at 21:32

## 2024-07-15 RX ADMIN — HEPARIN SODIUM 5000 UNITS: 5000 INJECTION INTRAVENOUS; SUBCUTANEOUS at 14:28

## 2024-07-15 RX ADMIN — Medication 3 MG: at 21:32

## 2024-07-15 RX ADMIN — METRONIDAZOLE 500 MG: 500 INJECTION, SOLUTION INTRAVENOUS at 01:55

## 2024-07-15 RX ADMIN — PANTOPRAZOLE SODIUM 40 MG: 40 INJECTION, POWDER, FOR SOLUTION INTRAVENOUS at 21:31

## 2024-07-15 RX ADMIN — DEXTROSE, SODIUM CHLORIDE, AND POTASSIUM CHLORIDE 100 ML/HR: 5; .45; .15 INJECTION INTRAVENOUS at 15:55

## 2024-07-15 RX ADMIN — ACETAMINOPHEN 1000 MG: 10 INJECTION INTRAVENOUS at 09:09

## 2024-07-15 RX ADMIN — POTASSIUM CHLORIDE 20 MEQ: 14.9 INJECTION, SOLUTION INTRAVENOUS at 09:19

## 2024-07-15 NOTE — UTILIZATION REVIEW
Continued Stay Review    Date: 07/13                          Current Patient Class: IP  Current Level of Care: Level 2 stepdown/HOT    HPI:58 y.o. female initially admitted on 07/10     Assessment/Plan: No acute events overnight. No BM, passing flatus. Has been OOB to chair. Cont NPO. NGT remains till 07/15. Plan for UGI study on 7/15. Continue PCA while NGT in place. Cont PPI. Continue ceftriaxone/Flagyl/fluconazole until 7/15. Continue to monitor bowel function. Monitor SYLVIA drain output. Pain/ nausea control PRN. OOB/ ambulation. Incentive Spirometry      Vital Signs (last 3 days)       Date/Time Temp Pulse Resp BP MAP (mmHg) SpO2 O2 Device Patient Position - Orthostatic VS [place] Coma Scale Score Pain    07/15/24 15:06:49 97.9 °F (36.6 °C) 82 14 125/91 102 96 % -- -- -- --    07/15/24 1400 -- -- -- -- -- -- -- -- -- No Pain    07/15/24 1200 -- -- -- -- -- -- -- -- 15 --    07/15/24 0942 -- -- -- -- -- -- -- -- -- 3    07/15/24 0800 -- -- -- -- -- -- None (Room air) -- 15 3    07/15/24 07:34:27 97.6 °F (36.4 °C) 77 17 127/86 100 95 % -- -- -- --    07/15/24 0712 -- -- -- -- -- -- -- -- -- No Pain    07/15/24 0630 -- 76 -- -- -- 95 % -- -- -- --    07/15/24 0622 -- -- -- -- -- -- -- -- -- No Pain    07/15/24 0600 -- 65 -- -- -- 95 % -- -- -- --    07/15/24 0500 -- 67 -- -- -- 96 % -- -- -- --    07/15/24 0401 -- 79 -- -- -- 95 % -- -- -- No Pain    07/15/24 0400 -- 65 -- -- -- 95 % -- -- 15 --    07/15/24 03:21:28 97.5 °F (36.4 °C) 89 -- 146/81 103 95 % -- -- -- --    07/15/24 03:21:16 97.5 °F (36.4 °C) 68 -- 146/81 103 97 % -- -- -- --    07/15/24 02:08:05 97.6 °F (36.4 °C) 70 -- 128/84 99 95 % -- -- -- --    07/15/24 02:07:50 97.6 °F (36.4 °C) 70 18 128/84 99 96 % None (Room air) Lying -- No Pain    07/14/24 22:32:22 98.3 °F (36.8 °C) 88 17 155/95 115 95 % None (Room air) Lying -- No Pain    07/14/24 2000 -- -- -- -- -- -- -- -- 15 --    07/14/24 1945 -- -- -- -- -- -- -- -- 15 No Pain    07/14/24 18:49:22 97.6  °F (36.4 °C) 70 18 131/88 102 95 % None (Room air) Lying -- No Pain    07/14/24 1600 -- -- -- -- -- -- -- -- 15 --    07/14/24 1539 -- -- -- -- -- -- -- -- -- No Pain    07/14/24 15:23:31 97.8 °F (36.6 °C) 76 17 137/89 105 93 % None (Room air) Lying -- No Pain    07/14/24 10:40:54 97.8 °F (36.6 °C) 80 18 132/92 105 98 % -- -- -- --    07/14/24 07:32:54 97.6 °F (36.4 °C) 78 18 133/90 104 95 % -- -- -- --    07/14/24 0522 -- -- -- -- -- -- -- -- -- No Pain    07/14/24 02:25:50 98 °F (36.7 °C) 67 16 137/84 102 96 % None (Room air) -- -- No Pain    07/13/24 2335 -- -- -- -- -- 94 % None (Room air) -- 15 No Pain    07/13/24 22:50:09 98.3 °F (36.8 °C) 89 16 151/90 110 94 % None (Room air) -- -- No Pain    07/13/24 1949 -- -- -- -- -- 93 % None (Room air) -- 15 No Pain    07/13/24 1901 -- -- -- -- -- -- -- -- -- No Pain    07/13/24 18:43:14 97.7 °F (36.5 °C) 88 16 136/88 104 92 % None (Room air) Lying -- --    07/13/24 15:24:18 97.9 °F (36.6 °C) 86 16 142/89 107 93 % None (Room air) Lying -- --    07/13/24 1300 -- -- -- -- -- -- -- -- -- No Pain    07/13/24 1200 -- -- -- -- -- -- -- -- 15 --    07/13/24 10:51:51 98 °F (36.7 °C) 91 15 155/96 116 95 % -- -- -- --    07/13/24 09:53:20 97.9 °F (36.6 °C) 89 15 142/86 105 95 % -- -- -- --    07/13/24 0800 -- -- -- -- -- -- -- -- 15 --    07/13/24 0400 -- -- -- -- -- -- -- -- 15 No Pain    07/13/24 03:14:42 98 °F (36.7 °C) 97 -- 146/94 111 92 % -- -- -- --    07/13/24 0000 -- -- -- -- -- 93 % None (Room air) -- 15 No Pain    07/12/24 23:04:23 97.8 °F (36.6 °C) 74 16 130/81 97 93 % None (Room air) -- -- No Pain    07/12/24 2000 -- -- -- -- -- 92 % None (Room air) -- 15 No Pain    07/12/24 19:42:27 98.5 °F (36.9 °C) 82 -- 143/84 104 92 % None (Room air) -- -- No Pain    07/12/24 1900 -- -- -- -- -- -- -- -- -- 4    07/12/24 17:05:37 -- 90 -- 136/95 109 94 % -- -- -- --    07/12/24 1600 -- -- -- -- -- -- -- -- 15 --    07/12/24 1549 -- -- -- -- -- -- -- -- -- 5    07/12/24 15:46:34  98.4 °F (36.9 °C) 94 13 141/91 108 93 % -- -- -- --    07/12/24 1413 -- -- -- -- -- -- -- -- -- 2    07/12/24 1200 -- -- -- -- -- -- -- -- 15 --    07/12/24 11:12:42 98.1 °F (36.7 °C) 83 14 129/83 98 93 % -- -- -- --    07/12/24 1030 -- -- -- -- -- -- -- -- -- 1    07/12/24 0919 -- -- -- -- -- -- -- -- -- 8    07/12/24 0800 -- -- -- -- -- -- -- -- 15 7    07/12/24 07:52:33 98.4 °F (36.9 °C) -- -- 135/87 103 -- -- -- -- --    07/12/24 07:52:19 98.4 °F (36.9 °C) -- 14 135/87 103 -- -- -- -- --    07/12/24 0738 -- -- -- -- -- -- -- -- -- 2    07/12/24 02:48:52 -- 71 -- 118/80 93 93 % -- -- -- --    07/12/24 0000 -- -- -- -- -- -- -- -- 15 --          Weight (last 2 days)       None              Pertinent Labs/Diagnostic Results:   Radiology:  FL upper GI UGI   Final Interpretation by Francis Earl MD (07/15 7746)      No evidence of a leak.            Workstation performed: [id number]         XR chest portable   Final Interpretation by Joe Rodriguez MD (07/11 4990)      Minimal atelectasis at the left lung base.            Workstation performed: [id number]         CT abdomen pelvis with contrast   Final Interpretation by Verenice Knox MD (07/10 5879)      Findings most compatible with perforated distal gastric antral/pyloric ulcer.      Masslike proximal stomach opacity. Hematoma versus submucosal mass such as leiomyoma or GIST.            Workstation performed: [id number]           Cardiology:  ECG 12 lead   Final Result by Osvaldo Pacheco DO (07/14 0818)   Normal sinus rhythm   Normal ECG   When compared with ECG of 12-JUL-2024 17:10, (unconfirmed)   No significant change was found   Confirmed by Osvaldo Pacheco ([id number]) on 7/14/2024 8:18:22 AM      ECG 12 lead   Final Result by Osvaldo Pacheco DO (07/14 0818)   Normal sinus rhythm   Normal ECG   When compared with ECG of 06-JAN-2022 15:37,   Nonspecific T wave abnormality no longer evident in Lateral leads   Confirmed by Osvaldo Pacheco  "([id number]) on 7/14/2024 8:18:20 AM        GI:  No orders to display           Results from last 7 days   Lab Units 07/15/24  0514 07/14/24  0514 07/13/24  0732 07/12/24  0741 07/11/24  0519   WBC Thousand/uL 9.14 9.79 13.87* 16.52* 16.56*   HEMOGLOBIN g/dL 14.7 15.5* 13.8 13.0 13.2   HEMATOCRIT % 43.6 45.7 41.1 39.6 39.4   PLATELETS Thousands/uL 283 296 244 225 199   TOTAL NEUT ABS Thousands/µL 5.36 6.66 10.76* 14.52* 14.79*         Results from last 7 days   Lab Units 07/15/24  0514 07/14/24  0514 07/13/24  0732 07/12/24  0741 07/12/24 0617 07/11/24  0519   SODIUM mmol/L 135 135 138  --  134* 136   POTASSIUM mmol/L 3.7 3.8 3.6  --  4.3 4.2   CHLORIDE mmol/L 103 103 104  --  104 105   CO2 mmol/L 25 25 24  --  24 26   ANION GAP mmol/L 7 7 10  --  6 5   BUN mg/dL 10 10 11  --  18 17   CREATININE mg/dL 0.58* 0.51* 0.46*  --  0.57* 0.61   EGFR ml/min/1.73sq m 100 104 108  --  101 98   CALCIUM mg/dL 8.9 9.4 9.0  --  8.3* 8.5   MAGNESIUM mg/dL  --   --   --  2.1  --  1.9   PHOSPHORUS mg/dL  --   --   --   --   --  3.2     Results from last 7 days   Lab Units 07/10/24  0542   AST U/L 21   ALT U/L 26   ALK PHOS U/L 55   TOTAL PROTEIN g/dL 6.7   ALBUMIN g/dL 4.2   TOTAL BILIRUBIN mg/dL 0.82         Results from last 7 days   Lab Units 07/15/24  0514 07/14/24  0514 07/13/24  0732 07/12/24  0617 07/11/24  0519 07/10/24  0542   GLUCOSE RANDOM mg/dL 130 131 85 99 127 140             No results found for: \"BETA-HYDROXYBUTYRATE\"                           Results from last 7 days   Lab Units 07/10/24  0702   PROTIME seconds 12.9   INR  0.91   PTT seconds 28             Results from last 7 days   Lab Units 07/10/24  0744   LACTIC ACID mmol/L 1.5                                 Results from last 7 days   Lab Units 07/10/24  0542   LIPASE u/L 53                                                 Results from last 7 days   Lab Units 07/10/24  0744   BLOOD CULTURE  No Growth After 5 Days.  No Growth After 5 Days.               "     Medications:   Scheduled Medications:  acetaminophen (Ofirmev) injection 1,000 mg q6g IV   fluconazole, 400 mg, Intravenous, Q24H  heparin (porcine), 5,000 Units, Subcutaneous, Q8H SHELIA  melatonin, 3 mg, Oral, HS  Multivitamin, 15 mL, Per NG Tube, Daily  pantoprazole, 40 mg, Intravenous, Q12H SHELIA      Continuous IV Infusions:  dextrose 5 % and sodium chloride 0.45 % with KCl 20 mEq/L, 100 mL/hr, Intravenous, Continuous  HYDROmorphone (DILAUDID) 1 mg/mL 50 mL PCA  Continuous Rate: 0 mg/hr  PCA Dose: 0.2 mg  PCA Lock-out: 5 Minutes  One Hour Limit: 2.2 mg  Freq: Continuous Route: IV  Last Dose: Stopped (07/15/24 1527)  Start: 07/10/24 1030 End: 07/15/24 1514    PRN Meds:  benzocaine, 2 spray, Mucosal, Q6H PRN  HYDROmorphone, 0.2 mg, Intravenous, Q3H PRN  naloxone, 0.04 mg, Intravenous, Q1MIN PRN  ondansetron, 4 mg, Intravenous, Q4H PRN  oxyCODONE, 5 mg, Oral, Q4H PRN  oxyCODONE, 2.5 mg, Oral, Q4H PRN        Discharge Plan: D    Network Utilization Review Department  ATTENTION: Please call with any questions or concerns to [phone] and carefully listen to the prompts so that you are directed to the right person. All voicemails are confidential.   For Discharge needs, contact Care Management DC Support Team at [phone] opt. 2  Send all requests for admission clinical reviews, approved or denied determinations and any other requests to dedicated fax number below belonging to the campus where the patient is receiving treatment. List of dedicated fax numbers for the Facilities:  FACILITY NAME UR FAX NUMBER   ADMISSION DENIALS (Administrative/Medical Necessity) [phone]   DISCHARGE SUPPORT TEAM (NETWORK) [phone]   PARENT CHILD HEALTH (Maternity/NICU/Pediatrics) [phone]   [facility] [phone]   [facility] [phone]   [facility] [phone]   [facility] [phone]   [facility]  [facility] [phone]   [facility] [phone]   [facility] [phone]   [facility] [phone]   [facility] [phone]   [facility] [phone]   [facility] [phone]   [facility] [phone]

## 2024-07-15 NOTE — PLAN OF CARE
Problem: PAIN - ADULT  Goal: Verbalizes/displays adequate comfort level or baseline comfort level  Description: Interventions:  - Encourage patient to monitor pain and request assistance  - Assess pain using appropriate pain scale  - Administer analgesics based on type and severity of pain and evaluate response  - Implement non-pharmacological measures as appropriate and evaluate response  - Consider cultural and social influences on pain and pain management  - Notify physician/advanced practitioner if interventions unsuccessful or patient reports new pain  Outcome: Progressing     Problem: INFECTION - ADULT  Goal: Absence or prevention of progression during hospitalization  Description: INTERVENTIONS:  - Assess and monitor for signs and symptoms of infection  - Monitor lab/diagnostic results  - Monitor all insertion sites, i.e. indwelling lines, tubes, and drains  - Monitor endotracheal if appropriate and nasal secretions for changes in amount and color  - [place] appropriate cooling/warming therapies per order  - Administer medications as ordered  - Instruct and encourage patient and family to use good hand hygiene technique  - Identify and instruct in appropriate isolation precautions for identified infection/condition  Outcome: Progressing  Goal: Absence of fever/infection during neutropenic period  Description: INTERVENTIONS:  - Monitor WBC    Outcome: Progressing     Problem: SAFETY ADULT  Goal: Patient will remain free of falls  Description: INTERVENTIONS:  - Educate patient/family on patient safety including physical limitations  - Instruct patient to call for assistance with activity   - Consult OT/PT to assist with strengthening/mobility   - Keep Call bell within reach  - Keep bed low and locked with side rails adjusted as appropriate  - Keep care items and personal belongings within reach  - Initiate and maintain comfort rounds  - Apply yellow socks and bracelet for high fall risk patients  - Consider  moving patient to room near nurses station  Outcome: Progressing  Goal: Maintain or return to baseline ADL function  Description: INTERVENTIONS:  -  Assess patient's ability to carry out ADLs; assess patient's baseline for ADL function and identify physical deficits which impact ability to perform ADLs (bathing, care of mouth/teeth, toileting, grooming, dressing, etc.)  - Assess/evaluate cause of self-care deficits   - Assess range of motion  - Assess patient's mobility; develop plan if impaired  - Assess patient's need for assistive devices and provide as appropriate  - Encourage maximum independence but intervene and supervise when necessary  - Involve family in performance of ADLs  - Assess for home care needs following discharge   - Consider OT consult to assist with ADL evaluation and planning for discharge  - Provide patient education as appropriate  Outcome: Progressing  Goal: Maintains/Returns to pre admission functional level  Description: INTERVENTIONS:  - Perform AM-PAC 6 Click Basic Mobility/ Daily Activity assessment daily.  - Set and communicate daily mobility goal to care team and patient/family/caregiver.   - Out of bed for toileting  - Record patient progress and toleration of activity level   Outcome: Progressing     Problem: DISCHARGE PLANNING  Goal: Discharge to home or other facility with appropriate resources  Description: INTERVENTIONS:  - Identify barriers to discharge w/patient and caregiver  - Arrange for needed discharge resources and transportation as appropriate  - Identify discharge learning needs (meds, wound care, etc.)  - Arrange for interpretive services to assist at discharge as needed  - Refer to Case Management Department for coordinating discharge planning if the patient needs post-hospital services based on physician/advanced practitioner order or complex needs related to functional status, cognitive ability, or social support system  Outcome: Progressing     Problem: Knowledge  Deficit  Goal: Patient/family/caregiver demonstrates understanding of disease process, treatment plan, medications, and discharge instructions  Description: Complete learning assessment and assess knowledge base.  Interventions:  - Provide teaching at level of understanding  - Provide teaching via preferred learning methods  Outcome: Progressing     Problem: Nutrition/Hydration-ADULT  Goal: Nutrient/Hydration intake appropriate for improving, restoring or maintaining nutritional needs  Description: Monitor and assess patient's nutrition/hydration status for malnutrition. Collaborate with interdisciplinary team and initiate plan and interventions as ordered.  Monitor patient's weight and dietary intake as ordered or per policy. Utilize nutrition screening tool and intervene as necessary. Determine patient's food preferences and provide high-protein, high-caloric foods as appropriate.     INTERVENTIONS:  - Monitor oral intake, urinary output, labs, and treatment plans  - Assess nutrition and hydration status and recommend course of action  - Evaluate amount of meals eaten  - Assist patient with eating if necessary   - Allow adequate time for meals  - Recommend/ encourage appropriate diets, oral nutritional supplements, and vitamin/mineral supplements  - Order, calculate, and assess calorie counts as needed  - Recommend, monitor, and adjust tube feedings and TPN/PPN based on assessed needs  - Assess need for intravenous fluids  - Provide specific nutrition/hydration education as appropriate  - Include patient/family/caregiver in decisions related to nutrition  Outcome: Progressing

## 2024-07-15 NOTE — PHYSICAL THERAPY NOTE
PHYSICAL THERAPY TREATMENT NOTE      Patient Name: Nidhi Jeffries  Today's Date: 7/15/2024     07/15/24 0942   PT Last Visit   PT Visit Date 07/15/24   Pain Assessment   Pain Assessment Tool 0-10   Pain Score 3   Pain Location/Orientation Location: Incision   Hospital Pain Intervention(s) Repositioned;Ambulation/increased activity;Other (Comment)  (pt utilized PCA button)   Restrictions/Precautions   Other Precautions Chair Alarm;Bed Alarm;Multiple lines;Fall Risk;Pain   General   Chart Reviewed Yes   Additional Pertinent History room air resting pulse ox 96% and 84 BPM   Family/Caregiver Present No   Cognition   Overall Cognitive Status WFL   Arousal/Participation Alert;Cooperative   Attention Within functional limits   Orientation Level Oriented to person;Other (Comment)  (pt was identified w/ full name, birth date)   Following Commands Follows one step commands without difficulty   Subjective   Subjective pt seen supine in bed. agreed to PT session. reports having inciaional pain.   Bed Mobility   Supine to Sit 5  Supervision   Additional items HOB elevated;Bedrails;Increased time required   Sit to Supine 5  Supervision  (pt declined remaining out of bed)   Additional items HOB elevated;Increased time required   Transfers   Sit to Stand 5  Supervision   Additional items Increased time required   Stand to Sit 5  Supervision   Additional items Increased time required;Verbal cues  (for hand placement)   Ambulation/Elevation   Gait pattern Foward flexed;Short stride   Gait Assistance 5  Supervision   Additional items Verbal cues  (for posture)   Assistive Device Rolling walker   Distance 90 feet x2 w/ standing rest break  (additional ambulation not possible due to fatigue, pain)   Stair Management Assistance Not tested   Balance   Static Sitting Good   Static Standing Fair +  (w/ roller walker)   Ambulatory Fair -  (w/ roller walker)    Activity Tolerance   Activity Tolerance Patient limited by fatigue;Patient limited by pain   Nurse Made Aware spoke to Rosa JENKINS   Equipment Use   Comments ankle pumps 30. quad sets 20. heel slides 10.   Assessment   Problem List Decreased strength;Decreased endurance;Impaired balance;Decreased mobility;Decreased safety awareness;Pain   Assessment pt shows improvement in mobility status from previous session w/ increased ambulation tolerance. pt needed standby assist and occasional verbal cues to maintain safety. pt needs continued inpatient PT to reduce fall risk and progress mobility training as appropriate.   Goals   Patient Goals I want to go home   STG Expiration Date 07/22/24   Short Term Goal #1 pt will: Increase bilateral LE strength 1/2 grade to facilitate independent mobility, Perform bed mobility independently to increase level of independence, Perform all transfers independently to improve independence, Ambulate 300 ft. with least restrictive assistive device independently w/o LOB to improve functional independence, Navigate 10 stair(s) independently with unilateral handrail to facilitate return to previous living environment, Increase ambulatory balance 1 grade to decrease risk for falls, Complete exercise program independently to increase strength and endurance, Tolerate 3 hr OOB to faciliate upright tolerance, Improve Barthel Index score to 85 or greater to facilitate independence, and Complete Timed Up and Go or Comfortable Gait Speed to further assess mobility and monitor progress   PT Treatment Day 2   Plan   Treatment/Interventions Functional transfer training;LE strengthening/ROM;Elevations;Therapeutic exercise;Endurance training;Gait training;Bed mobility;Equipment eval/education;Patient/family training   Progress Progressing toward goals   PT Frequency 3-5x/wk   Discharge Recommendation   Rehab Resource Intensity Level, PT III (Minimum Resource Intensity)   Equipment Recommended Walker    Walker Package Recommended Wheeled walker   Change/add to Walker Package? No   AM-PAC Basic Mobility Inpatient   Turning in Flat Bed Without Bedrails 4   Lying on Back to Sitting on Edge of Flat Bed Without Bedrails 3   Moving Bed to Chair 3   Standing Up From Chair Using Arms 3   Walk in Room 3   Climb 3-5 Stairs With Railing 2   Basic Mobility Inpatient Raw Score 18   Basic Mobility Standardized Score 41.05   [facility] Highest Level Of Mobility   -HLM Goal 6: Walk 10 steps or more   -HLM Achieved 7: Walk 25 feet or more   End of Consult   Patient Position at End of Consult Supine;Bed/Chair alarm activated;All needs within reach     The patient's AM-PAC Basic Mobility Inpatient Short Form Raw Score is 18. A Raw score of greater than 16 suggests the patient may benefit from discharge to home. Please also refer to the recommendation of the Physical Therapist for safe discharge planning.    Skilled inpatient PT recommended while in hospital to progress pt toward treatment goals.    Romero Perdomo, PT

## 2024-07-15 NOTE — PLAN OF CARE
Problem: PAIN - ADULT  Goal: Verbalizes/displays adequate comfort level or baseline comfort level  Description: Interventions:  - Encourage patient to monitor pain and request assistance  - Assess pain using appropriate pain scale  - Administer analgesics based on type and severity of pain and evaluate response  - Implement non-pharmacological measures as appropriate and evaluate response  - Consider cultural and social influences on pain and pain management  - Notify physician/advanced practitioner if interventions unsuccessful or patient reports new pain  7/14/2024 2217 by Trudy Whitlock RN  Outcome: Progressing  7/14/2024 1539 by Trudy Whitlock RN  Outcome: Progressing     Problem: INFECTION - ADULT  Goal: Absence or prevention of progression during hospitalization  Description: INTERVENTIONS:  - Assess and monitor for signs and symptoms of infection  - Monitor lab/diagnostic results  - Monitor all insertion sites, i.e. indwelling lines, tubes, and drains  - Monitor endotracheal if appropriate and nasal secretions for changes in amount and color  - [place] appropriate cooling/warming therapies per order  - Administer medications as ordered  - Instruct and encourage patient and family to use good hand hygiene technique  - Identify and instruct in appropriate isolation precautions for identified infection/condition  7/14/2024 2217 by Trudy Whitlock RN  Outcome: Progressing  7/14/2024 1539 by Trudy Whitlock RN  Outcome: Progressing     Problem: Nutrition/Hydration-ADULT  Goal: Nutrient/Hydration intake appropriate for improving, restoring or maintaining nutritional needs  Description: Monitor and assess patient's nutrition/hydration status for malnutrition. Collaborate with interdisciplinary team and initiate plan and interventions as ordered.  Monitor patient's weight and dietary intake as ordered or per policy. Utilize nutrition screening tool and intervene as necessary. Determine patient's food preferences and  provide high-protein, high-caloric foods as appropriate.     INTERVENTIONS:  - Monitor oral intake, urinary output, labs, and treatment plans  - Assess nutrition and hydration status and recommend course of action  - Evaluate amount of meals eaten  - Assist patient with eating if necessary   - Allow adequate time for meals  - Recommend/ encourage appropriate diets, oral nutritional supplements, and vitamin/mineral supplements  - Order, calculate, and assess calorie counts as needed  - Recommend, monitor, and adjust tube feedings and TPN/PPN based on assessed needs  - Assess need for intravenous fluids  - Provide specific nutrition/hydration education as appropriate  - Include patient/family/caregiver in decisions related to nutrition  7/14/2024 2217 by Trudy Whitlock RN  Outcome: Progressing  7/14/2024 1539 by Trudy Whitlock RN  Outcome: Progressing

## 2024-07-15 NOTE — PLAN OF CARE
Problem: PHYSICAL THERAPY ADULT  Goal: Performs mobility at highest level of function for planned discharge setting.  See evaluation for individualized goals.  Description: Treatment/Interventions: Functional transfer training, LE strengthening/ROM, Elevations, Therapeutic exercise, Endurance training, Gait training, Bed mobility, Equipment eval/education, Patient/family training  Equipment Recommended: Walker       See flowsheet documentation for full assessment, interventions and recommendations.  Outcome: Progressing  Note:    Problem List: Decreased strength, Decreased endurance, Impaired balance, Decreased mobility, Decreased safety awareness, Pain  Assessment: pt shows improvement in mobility status from previous session w/ increased ambulation tolerance. pt needed standby assist and occasional verbal cues to maintain safety. pt needs continued inpatient PT to reduce fall risk and progress mobility training as appropriate.        Rehab Resource Intensity Level, PT: III (Minimum Resource Intensity)    See flowsheet documentation for full assessment.

## 2024-07-16 ENCOUNTER — TELEPHONE (OUTPATIENT)
Dept: GASTROENTEROLOGY | Facility: CLINIC | Age: 59
End: 2024-07-16

## 2024-07-16 PROCEDURE — 97535 SELF CARE MNGMENT TRAINING: CPT

## 2024-07-16 PROCEDURE — 97116 GAIT TRAINING THERAPY: CPT

## 2024-07-16 PROCEDURE — 99024 POSTOP FOLLOW-UP VISIT: CPT | Performed by: SURGERY

## 2024-07-16 RX ORDER — POTASSIUM CHLORIDE 20 MEQ/1
40 TABLET, EXTENDED RELEASE ORAL ONCE
Status: COMPLETED | OUTPATIENT
Start: 2024-07-16 | End: 2024-07-16

## 2024-07-16 RX ADMIN — HEPARIN SODIUM 5000 UNITS: 5000 INJECTION INTRAVENOUS; SUBCUTANEOUS at 14:49

## 2024-07-16 RX ADMIN — HEPARIN SODIUM 5000 UNITS: 5000 INJECTION INTRAVENOUS; SUBCUTANEOUS at 21:48

## 2024-07-16 RX ADMIN — HEPARIN SODIUM 5000 UNITS: 5000 INJECTION INTRAVENOUS; SUBCUTANEOUS at 04:44

## 2024-07-16 RX ADMIN — POTASSIUM CHLORIDE 40 MEQ: 1500 TABLET, EXTENDED RELEASE ORAL at 12:25

## 2024-07-16 RX ADMIN — ACETAMINOPHEN 975 MG: 325 TABLET, FILM COATED ORAL at 04:44

## 2024-07-16 RX ADMIN — FLUCONAZOLE 400 MG: 2 INJECTION, SOLUTION INTRAVENOUS at 14:49

## 2024-07-16 RX ADMIN — Medication 3 MG: at 21:48

## 2024-07-16 RX ADMIN — ACETAMINOPHEN 975 MG: 325 TABLET, FILM COATED ORAL at 12:25

## 2024-07-16 RX ADMIN — PANTOPRAZOLE SODIUM 40 MG: 40 INJECTION, POWDER, FOR SOLUTION INTRAVENOUS at 08:10

## 2024-07-16 RX ADMIN — PANTOPRAZOLE SODIUM 40 MG: 40 INJECTION, POWDER, FOR SOLUTION INTRAVENOUS at 21:48

## 2024-07-16 RX ADMIN — ACETAMINOPHEN 975 MG: 325 TABLET, FILM COATED ORAL at 17:19

## 2024-07-16 NOTE — PLAN OF CARE
Problem: PHYSICAL THERAPY ADULT  Goal: Performs mobility at highest level of function for planned discharge setting.  See evaluation for individualized goals.  Description: Treatment/Interventions: Functional transfer training, LE strengthening/ROM, Elevations, Therapeutic exercise, Endurance training, Gait training, Bed mobility, Equipment eval/education, Patient/family training  Equipment Recommended: Walker       See flowsheet documentation for full assessment, interventions and recommendations.  Outcome: Progressing  Note:    Problem List: Decreased strength, Decreased endurance, Impaired balance, Decreased mobility, Decreased safety awareness  Assessment: Patient agreeable to PT session this morning wanted focus to be primarily on stairs as patient was concerned about getting to her second floor.  Patient has 1 step to enter her home without a rail and then 2+ 8-10+1 with one rail to her second floor where her bathroom is.  Patient is concerned about having to go up/down the steps to her bathroom 2/2 frequency of needing to use the bathroom.  PT recommended patient to obtain a commode for the first floor if urgency with toileting is an issue.  Patient did very well up/down with the stepstool and the stairs in the stairwell.  While admitted, patient will continue to benefit from skilled physical therapy services to continue to increase her strength, endurance, balance, mobility and safe gait with a roller walker on level surfaces and gait on stairs.  Patient would benefit from continued gait training on the steps in addition to level surfaces while admitted.  When medically stable for discharge, patient is appropriate for Level III Minimum Resource Intensity.        Rehab Resource Intensity Level, PT: III (Minimum Resource Intensity)    See flowsheet documentation for full assessment.

## 2024-07-16 NOTE — PHYSICAL THERAPY NOTE
"   PT TREATMENT     07/16/24 0933   PT Last Visit   PT Visit Date 07/16/24   Note Type   Note Type Treatment   Pain Assessment   Pain Assessment Tool 0-10   Pain Score No Pain   Restrictions/Precautions   Other Precautions Fall Risk;Bed Alarm;Chair Alarm;Multiple lines  (IV; SYLVIA drain; Satish on room air)   General   Chart Reviewed Yes   Family/Caregiver Present No   Cognition   Overall Cognitive Status WFL   Arousal/Participation Cooperative   Attention Within functional limits   Orientation Level Oriented X4   Memory Within functional limits   Following Commands Follows all commands and directions without difficulty   Comments At least 2 patient identifiers including name and date of birth   Subjective   Subjective \"I had like to practice the stairs today\"   Bed Mobility   Sit to Supine 5  Supervision   Additional items Assist x 1;Verbal cues;Increased time required;Bedrails  (Patient requested to return back to bed as \"chair is very uncomfortable.\"  Patient moves out of bed multiple times per day to use the bathroom.  Patient does eat meals sitting out of bed in the chair)   Additional Comments Patient received out of bed in chair   Transfers   Sit to Stand 5  Supervision   Additional items Armrests;Assist x 1;Verbal cues;Increased time required   Stand to Sit 5  Supervision   Additional items Armrests;Assist x 1;Verbal cues;Increased time required   Ambulation/Elevation   Gait pattern Short stride;Decreased foot clearance;Step through pattern   Gait Assistance 5  Supervision   Additional items Assist x 1;Verbal cues;Tactile cues   Assistive Device Rolling walker   Distance 80 feet and 100 feet with change in direction and rest.  In between ambulation trial   Stair Management Assistance   (Min assist with stepstool progressing to CGA; CGA/close supervision and stairwell)   Additional items Assist x 1;Verbal cues;Tactile cues   Stair Management Technique Step to pattern   Number of Stairs   (10 steps with " stepstool and bed rail in patient's room then progressing to 4 stairs in the stairwell, number of steps limited by IV)   Balance   Static Sitting Good   Static Standing Fair +  (With roller walker)   Ambulatory Fair  (With roller walker)   Endurance Deficit   Endurance Deficit Yes   Activity Tolerance   Activity Tolerance Patient limited by fatigue   Assessment   Problem List Decreased strength;Decreased endurance;Impaired balance;Decreased mobility;Decreased safety awareness   Assessment Patient agreeable to PT session this morning wanted focus to be primarily on stairs as patient was concerned about getting to her second floor.  Patient has 1 step to enter her home without a rail and then 2+ 8-10+1 with one rail to her second floor where her bathroom is.  Patient is concerned about having to go up/down the steps to her bathroom 2/2 frequency of needing to use the bathroom.  PT recommended patient to obtain a commode for the first floor if urgency with toileting is an issue.  Patient did very well up/down with the stepstool and the stairs in the stairwell.  While admitted, patient will continue to benefit from skilled physical therapy services to continue to increase her strength, endurance, balance, mobility and safe gait with a roller walker on level surfaces and gait on stairs.  Patient would benefit from continued gait training on the steps in addition to level surfaces while admitted.  When medically stable for discharge, patient is appropriate for Level III Minimum Resource Intensity.    The patient's AM-PAC Basic Mobility Inpatient Short Form Raw Score is 18. A Raw score of greater than 16 suggests the patient may benefit from discharge to home. Please also refer to the recommendation of the Physical Therapist for safe discharge planning.   Goals   STG Expiration Date 07/22/24   Short Term Goal #1 pt will: Increase bilateral LE strength 1/2 grade to facilitate independent mobility, Perform bed mobility  independently to increase level of independence, Perform all transfers independently to improve independence, Ambulate 300 ft. with least restrictive assistive device independently w/o LOB to improve functional independence, Navigate 10 stair(s) independently with unilateral handrail to facilitate return to previous living environment, Increase ambulatory balance 1 grade to decrease risk for falls, Complete exercise program independently to increase strength and endurance, Tolerate 3 hr OOB to faciliate upright tolerance, Improve Barthel Index score to 85 or greater to facilitate independence, and Complete Timed Up and Go or Comfortable Gait Speed to further assess mobility and monitor progress   Plan   Treatment/Interventions Functional transfer training;ADL retraining;LE strengthening/ROM;Elevations;Therapeutic exercise;Endurance training;Patient/family training;Equipment eval/education;Bed mobility;Gait training;Compensatory technique education;OT   PT Frequency 3-5x/wk   Discharge Recommendation   Rehab Resource Intensity Level, PT III (Minimum Resource Intensity)   AM-PAC Basic Mobility Inpatient   Turning in Flat Bed Without Bedrails 3   Lying on Back to Sitting on Edge of Flat Bed Without Bedrails 3   Moving Bed to Chair 3   Standing Up From Chair Using Arms 3   Walk in Room 3   Climb 3-5 Stairs With Railing 3   Basic Mobility Inpatient Raw Score 18   Basic Mobility Standardized Score 41.05   [facility] Highest Level Of Mobility   -HLM Goal 6: Walk 10 steps or more   [facility]-HLM Achieved 7: Walk 25 feet or more   Education   Education Provided Mobility training;Assistive device;Other  (Stairs)   Patient Demonstrates acceptance/verbal understanding;Explanation/teachback used;Reinforcement needed   End of Consult   Patient Position at End of Consult Supine;All needs within reach;Bed/Chair alarm activated   Licensure   [place] License Number  Nicole Mchugh PT     Portions of the documentation may have been  created using voice recognition software. Occasional wrong word or sound alike substitutions may have occurred due to the inherent limitation of the voice recognition software. Read the chart carefully and recognize, using context, where substitutions have occurred.

## 2024-07-16 NOTE — OCCUPATIONAL THERAPY NOTE
Occupational Therapy Progress Note/Discharge From Services     Patient Name: Nidhi Jeffries  Today's Date: 7/16/2024  Problem List  Active Problems:    Lumbar stenosis with neurogenic claudication    S/P exploratory laparotomy       07/16/24 0808   OT Last Visit   OT Visit Date 07/16/24   Note Type   Note Type Treatment   Pain Assessment   Pain Assessment Tool 0-10   Pain Score 6   Pain Location/Orientation Location: Abdomen   Pain Onset/Description Onset: Ongoing;Descriptor: Aching   Effect of Pain on Daily Activities limits activity tolerance, comfort, speed of ADLs/mobility   Patient's Stated Pain Goal No pain   Hospital Pain Intervention(s) Repositioned;Ambulation/increased activity;Emotional support   Restrictions/Precautions   Weight Bearing Precautions Per Order No   Other Precautions Chair Alarm;Bed Alarm;Multiple lines;Fall Risk;Pain  (SYLVIA drain R abdomen; IV)   Lifestyle   Autonomy Pt lives w/ son and mother in a 2 level house and is fully (I) PTA, no AD, (+) falls, (+)    Reciprocal Relationships Supportive son and mother   ADL   Where Assessed Edge of bed  (vs toilet in bathroom)   Eating Assistance 7  Independent   Eating Deficit Setup   Eating Comments sitting in recliner   Grooming Assistance 6  Modified Independent   Grooming Deficit Setup;Standing with assistive device;Teeth care;Brushing hair   Grooming Comments standing at sink in bathroom, denies fatigue. Wash/dry hair sitting EOB w/ setup   LB Dressing Assistance 6  Modified independent   LB Dressing Deficit Setup;Verbal cueing;Supervision/safety;Increased time to complete;Thread RLE into underwear;Thread LLE into underwear;Pull up over hips   LB Dressing Comments sitting EOB, VC for technique. Good carryover   Toileting Assistance  6  Modified independent   Toileting Deficit Setup;Verbal cueing;Supervison/safety;Increased time to complete;Grab bar use;Perineal hygiene   Toileting Comments urinate on toilet, hygiene in stance w/ S  "  Functional Standing Tolerance   Time 5 minutes   Activity grooming at sink   Comments unilateral vs trunk support against sinktop   Bed Mobility   Supine to Sit 6  Modified independent   Additional items HOB elevated;Increased time required   Additional Comments OOB to recliner at end of session   Transfers   Sit to Stand 6  Modified independent   Additional items Assist x 1;Increased time required   Stand to Sit 6  Modified independent   Additional items Assist x 1;Increased time required   Toilet transfer 6  Modified independent   Additional items Assist x 1;Increased time required;Standard toilet  (grab bar R)   Functional Mobility   Functional Mobility 6  Modified independent   Additional Comments Household distance to bathroom and back w/ RW and S for IV pole management   Additional items Rolling walker   Toilet Transfers   Toilet Transfer From Rolling walker   Toilet Transfer Type To and from   Toilet Transfer to Standard toilet   Toilet Transfer Technique Ambulating   Toilet Transfers Supervision;Modified independence   Toilet Transfers Comments grab bar R   Subjective   Subjective \"I just want to take a real shower but my surgeon said no yesterday\"   Cognition   Overall Cognitive Status WFL   Arousal/Participation Alert;Cooperative   Attention Within functional limits   Orientation Level Oriented X4   Memory Within functional limits   Following Commands Follows all commands and directions without difficulty   Comments Pleasant and agreeable, good safety/insight. Motivated to return home   Activity Tolerance   Activity Tolerance Patient limited by pain   Medical Staff Made Aware Spoke to PT LUIS FELIPE Childers, RN Rosa   Assessment   Assessment Pt seen for OT treatment on 7/16 s/p admit to [facility] w/ a perforated gastric ulcer, s/p ex lap with modified Amor patch repair 7/10 . Pt agreeable to OT treatment session, upon arrival patient was found supine in bed. Patient participated in skilled OT interventions to " address compensatory techniques for ADL tasks, functional transfers, standing balance/tolerance and overall activity tolerance and participation. In comparison to previous session, Nidhi demonstrated significant improvements in pain control, activity tolerance, functional reach, standing balance/tolerance and requires significantly less physical A to perform ADLs/ADL transfers but is continuing to perform below baseline due to pain limiting speed/performance of ADLs and need for AD for mobility. Pt demonstrated good carryover of compensatory techniques and educated on techniques for home. Pt denies need for further OT services at this time and has met all goals set for pt upon evaluation. Pt reports only concern is completing MADHURI to upstairs bathroom (PT notified). Will discharge from caseload.   Plan   Treatment Interventions ADL retraining;Functional transfer training;Endurance training;Patient/family training;Compensatory technique education   Goal Expiration Date 07/26/24   OT Treatment Day 1   OT Frequency Other (comment)  (discharge from caseload)   Discharge Recommendation   Rehab Resource Intensity Level, OT No post-acute rehabilitation needs  (increased social support for IADL completion)   Additional Comments  The patient's raw score on the -PAC Daily Activity Inpatient Short Form is 21. A raw score of greater than or equal to 19 suggests the patient may benefit from discharge to home. Please refer to the recommendation of the Occupational Therapist for safe discharge planning.   AM-PAC Daily Activity Inpatient   Lower Body Dressing 3   Bathing 3   Toileting 3   Upper Body Dressing 4   Grooming 4   Eating 4   Daily Activity Raw Score 21   Daily Activity Standardized Score (Calc for Raw Score >=11) 44.27   AM-PAC Applied Cognition Inpatient   Following a Speech/Presentation 4   Understanding Ordinary Conversation 4   Taking Medications 4   Remembering Where Things Are Placed or Put Away 4   Remembering  List of 4-5 Errands 4   Taking Care of Complicated Tasks 4   Applied Cognition Raw Score 24   Applied Cognition Standardized Score 62.21   End of Consult   Patient Position at End of Consult Bedside chair;All needs within reach   Nurse Communication Nurse aware of consult     SHIVAM Currie, OTR/L  [place] License [id number]  [place] License [id number]

## 2024-07-16 NOTE — UTILIZATION REVIEW
Continued Stay Review    Date: 07/14-07/16                          Current Patient Class: IP  Current Level of Care: MS    HPI:58 y.o. female initially admitted on 07/10     Assessment/Plan:   07/14: Pain controlled. No n/v. No BM, passing flatus. Voiding. On exam, abd soft, nd, minimal RUQ tenderness, CDI. NGT, SYLVIA in place. NPO/NGT til 7/15, plan for 7/15 UGI. mIVF@100. continue PCA while NGT in place. PPI bid. ctx/flagyl/fluconazole til 7/15. DVT ppx     07/15 Pt doing well this am. SYLVIA drain w/ light serosang. She is passing flatus but no BM. H. pylori negative.  Plan:NPO/NGT. UGI today to evaluate for leak, if no leak remove NGT. Continue PCA while NGT in place. PPI bid. Ctx/flagyl/fluconazole until today. DVT ppx.    07/16 Pt doing well. Incision cdi. Pain well controlled. Tolerating diet. Having BM and passing flatus.   SYLVIA serosanguineous. Advance to full liquids. Will assess if able to tolerate FLD and will advance to soft diet tomorrow. Cont IV PPI. Cont IV Diflucan.     Vital Signs (last 3 days)       Date/Time Temp Pulse Resp BP MAP (mmHg) SpO2 O2 Device Patient Position - Orthostatic VS Blank Coma Scale Score Pain    07/16/24 15:37:11 98.3 °F (36.8 °C) 84 16 132/89 103 97 % -- -- -- --    07/16/24 1225 -- -- -- -- -- -- -- -- -- 2    07/16/24 11:19:34 98.8 °F (37.1 °C) 79 15 136/89 105 97 % -- -- -- --    07/16/24 0933 -- -- -- -- -- -- -- -- -- No Pain    07/16/24 09:02:56 98.5 °F (36.9 °C) 84 16 117/93 101 98 % -- -- -- --    07/16/24 0808 -- -- -- -- -- -- -- -- -- 6    07/16/24 0800 -- -- -- -- -- -- -- -- 15 6    07/16/24 0444 -- -- -- -- -- -- -- -- -- 3    07/16/24 01:25:09 98.3 °F (36.8 °C) 76 -- 139/85 103 93 % -- -- -- --    07/15/24 2000 -- -- -- -- -- -- -- -- -- 1    07/15/24 1822 -- -- -- -- -- -- -- -- -- 1    07/15/24 1601 -- 105 -- -- -- 95 % -- -- -- --    07/15/24 1600 -- -- -- -- -- -- -- -- 15 --    07/15/24 15:06:49 97.9 °F (36.6 °C) 82 14 125/91 102 96 % -- -- -- --    07/15/24  1400 -- -- -- -- -- -- -- -- -- No Pain    07/15/24 1200 -- -- -- -- -- -- -- -- 15 --    07/15/24 0942 -- -- -- -- -- -- -- -- -- 3    07/15/24 0800 -- -- -- -- -- -- None (Room air) -- 15 3    07/15/24 07:34:27 97.6 °F (36.4 °C) 77 17 127/86 100 95 % -- -- -- --    07/15/24 0712 -- -- -- -- -- -- -- -- -- No Pain    07/15/24 0630 -- 76 -- -- -- 95 % -- -- -- --    07/15/24 0622 -- -- -- -- -- -- -- -- -- No Pain    07/15/24 0600 -- 65 -- -- -- 95 % -- -- -- --    07/15/24 0500 -- 67 -- -- -- 96 % -- -- -- --    07/15/24 0401 -- 79 -- -- -- 95 % -- -- -- No Pain    07/15/24 0400 -- 65 -- -- -- 95 % -- -- 15 --    07/15/24 03:21:28 97.5 °F (36.4 °C) 89 -- 146/81 103 95 % -- -- -- --    07/15/24 03:21:16 97.5 °F (36.4 °C) 68 -- 146/81 103 97 % -- -- -- --    07/15/24 02:08:05 97.6 °F (36.4 °C) 70 -- 128/84 99 95 % -- -- -- --    07/15/24 02:07:50 97.6 °F (36.4 °C) 70 18 128/84 99 96 % None (Room air) Lying -- No Pain    07/14/24 22:32:22 98.3 °F (36.8 °C) 88 17 155/95 115 95 % None (Room air) Lying -- No Pain    07/14/24 2000 -- -- -- -- -- -- -- -- 15 --    07/14/24 1945 -- -- -- -- -- -- -- -- 15 No Pain    07/14/24 18:49:22 97.6 °F (36.4 °C) 70 18 131/88 102 95 % None (Room air) Lying -- No Pain    07/14/24 1600 -- -- -- -- -- -- -- -- 15 --    07/14/24 1539 -- -- -- -- -- -- -- -- -- No Pain    07/14/24 15:23:31 97.8 °F (36.6 °C) 76 17 137/89 105 93 % None (Room air) Lying -- No Pain    07/14/24 10:40:54 97.8 °F (36.6 °C) 80 18 132/92 105 98 % -- -- -- --    07/14/24 07:32:54 97.6 °F (36.4 °C) 78 18 133/90 104 95 % -- -- -- --    07/14/24 0522 -- -- -- -- -- -- -- -- -- No Pain    07/14/24 02:25:50 98 °F (36.7 °C) 67 16 137/84 102 96 % None (Room air) -- -- No Pain    07/13/24 2335 -- -- -- -- -- 94 % None (Room air) -- 15 No Pain    07/13/24 22:50:09 98.3 °F (36.8 °C) 89 16 151/90 110 94 % None (Room air) -- -- No Pain    07/13/24 1949 -- -- -- -- -- 93 % None (Room air) -- 15 No Pain    07/13/24 1901 -- -- --  -- -- -- -- -- -- No Pain    07/13/24 18:43:14 97.7 °F (36.5 °C) 88 16 136/88 104 92 % None (Room air) Lying -- --    07/13/24 15:24:18 97.9 °F (36.6 °C) 86 16 142/89 107 93 % None (Room air) Lying -- --    07/13/24 1300 -- -- -- -- -- -- -- -- -- No Pain    07/13/24 1200 -- -- -- -- -- -- -- -- 15 --    07/13/24 10:51:51 98 °F (36.7 °C) 91 15 155/96 116 95 % -- -- -- --    07/13/24 09:53:20 97.9 °F (36.6 °C) 89 15 142/86 105 95 % -- -- -- --    07/13/24 0800 -- -- -- -- -- -- -- -- 15 --    07/13/24 0400 -- -- -- -- -- -- -- -- 15 No Pain    07/13/24 03:14:42 98 °F (36.7 °C) 97 -- 146/94 111 92 % -- -- -- --    07/13/24 0000 -- -- -- -- -- 93 % None (Room air) -- 15 No Pain          Weight (last 2 days)       None              Pertinent Labs/Diagnostic Results:   Radiology:  FL upper GI UGI   Final Interpretation by Francis Earl MD (07/15 1037)      No evidence of a leak.            Workstation performed: [id number]         XR chest portable   Final Interpretation by Joe Rodriguez MD (07/11 4355)      Minimal atelectasis at the left lung base.            Workstation performed: [id number]         CT abdomen pelvis with contrast   Final Interpretation by Verenice Knox MD (07/10 9533)      Findings most compatible with perforated distal gastric antral/pyloric ulcer.      Masslike proximal stomach opacity. Hematoma versus submucosal mass such as leiomyoma or GIST.            Workstation performed: [id number]           Cardiology:  ECG 12 lead   Final Result by Osvaldo Pacheco DO (07/14 0818)   Normal sinus rhythm   Normal ECG   When compared with ECG of 12-JUL-2024 17:10, (unconfirmed)   No significant change was found   Confirmed by Osvaldo Pacheco ([id number]) on 7/14/2024 8:18:22 AM      ECG 12 lead   Final Result by Osvaldo Pacheco DO (07/14 0818)   Normal sinus rhythm   Normal ECG   When compared with ECG of 06-JAN-2022 15:37,   Nonspecific T wave abnormality no longer evident in Lateral leads  "  Confirmed by Osvaldo Pacheco ([id number]) on 7/14/2024 8:18:20 AM        GI:  No orders to display           Results from last 7 days   Lab Units 07/15/24  0514 07/14/24  0514 07/13/24  0732 07/12/24  0741 07/11/24  0519   WBC Thousand/uL 9.14 9.79 13.87* 16.52* 16.56*   HEMOGLOBIN g/dL 14.7 15.5* 13.8 13.0 13.2   HEMATOCRIT % 43.6 45.7 41.1 39.6 39.4   PLATELETS Thousands/uL 283 296 244 225 199   TOTAL NEUT ABS Thousands/µL 5.36 6.66 10.76* 14.52* 14.79*         Results from last 7 days   Lab Units 07/15/24  0514 07/14/24  0514 07/13/24 0732 07/12/24  0741 07/12/24  0617 07/11/24  0519   SODIUM mmol/L 135 135 138  --  134* 136   POTASSIUM mmol/L 3.7 3.8 3.6  --  4.3 4.2   CHLORIDE mmol/L 103 103 104  --  104 105   CO2 mmol/L 25 25 24  --  24 26   ANION GAP mmol/L 7 7 10  --  6 5   BUN mg/dL 10 10 11  --  18 17   CREATININE mg/dL 0.58* 0.51* 0.46*  --  0.57* 0.61   EGFR ml/min/1.73sq m 100 104 108  --  101 98   CALCIUM mg/dL 8.9 9.4 9.0  --  8.3* 8.5   MAGNESIUM mg/dL  --   --   --  2.1  --  1.9   PHOSPHORUS mg/dL  --   --   --   --   --  3.2     Results from last 7 days   Lab Units 07/10/24  0542   AST U/L 21   ALT U/L 26   ALK PHOS U/L 55   TOTAL PROTEIN g/dL 6.7   ALBUMIN g/dL 4.2   TOTAL BILIRUBIN mg/dL 0.82         Results from last 7 days   Lab Units 07/15/24  0514 07/14/24  0514 07/13/24  0732 07/12/24  0617 07/11/24  0519 07/10/24  0542   GLUCOSE RANDOM mg/dL 130 131 85 99 127 140             No results found for: \"BETA-HYDROXYBUTYRATE\"                           Results from last 7 days   Lab Units 07/10/24  0702   PROTIME seconds 12.9   INR  0.91   PTT seconds 28             Results from last 7 days   Lab Units 07/10/24  0744   LACTIC ACID mmol/L 1.5                                 Results from last 7 days   Lab Units 07/10/24  0542   LIPASE u/L 53                                                 Results from last 7 days   Lab Units 07/10/24  0744   BLOOD CULTURE  No Growth After 5 Days.  No Growth After 5 " Days.                   Medications:   Scheduled Medications:  acetaminophen, 975 mg, Oral, Q6H SHELIA  fluconazole, 400 mg, Intravenous, Q24H  heparin (porcine), 5,000 Units, Subcutaneous, Q8H SHELIA  melatonin, 3 mg, Oral, HS  Multivitamin, 15 mL, Per NG Tube, Daily  pantoprazole, 40 mg, Intravenous, Q12H SHELIA  ceftriaxone (ROCEPHIN) 1 g/50 mL in dextrose IVPB  Dose: 1,000 mg  Freq: Every 24 hours Route: IV  Last Dose: 1,000 mg (07/14/24 1329)  Start: 07/10/24 1030 End: 07/14/24 1359  metroNIDAZOLE (FLAGYL) IVPB (premix) 500 mg 100 mL  Dose: 500 mg  Freq: Every 8 hours Route: IV  Last Dose: 500 mg (07/15/24 0155)  Start: 07/10/24 1030 End: 07/15/24 0225    Continuous IV Infusions:  dextrose 5 % and sodium chloride 0.45 % with KCl 20 mEq/L infusion  Rate: 50 mL/hr Dose: 50 mL/hr  Freq: Continuous Route: IV  Indications of Use: IV Hydration  Last Dose: Stopped (07/16/24 1225)  Start: 07/13/24 0730 End: 07/16/24 0942  HYDROmorphone (DILAUDID) 1 mg/mL 50 mL PCA  Continuous Rate: 0 mg/hr  PCA Dose: 0.2 mg  PCA Lock-out: 5 Minutes  One Hour Limit: 2.2 mg  Freq: Continuous Route: IV  Last Dose: Stopped (07/15/24 1527)  Start: 07/10/24 1030 End: 07/15/24 1514       PRN Meds:  benzocaine, 2 spray, Mucosal, Q6H PRN  HYDROmorphone, 0.2 mg, Intravenous, Q3H PRN  naloxone, 0.04 mg, Intravenous, Q1MIN PRN  ondansetron, 4 mg, Intravenous, Q4H PRN  oxyCODONE, 5 mg, Oral, Q4H PRN  oxyCODONE, 2.5 mg, Oral, Q4H PRN        Discharge Plan: D    Network Utilization Review Department  ATTENTION: Please call with any questions or concerns to [phone] and carefully listen to the prompts so that you are directed to the right person. All voicemails are confidential.   For Discharge needs, contact Care Management DC Support Team at [phone] opt. [phone]  Send all requests for admission clinical reviews, approved or denied determinations and any other requests to dedicated fax number below belonging to the campus where the patient is receiving  treatment. List of dedicated fax numbers for the Facilities:  FACILITY NAME UR FAX NUMBER   ADMISSION DENIALS (Administrative/Medical Necessity) [phone]   DISCHARGE SUPPORT TEAM (NETWORK) [phone]   PARENT CHILD HEALTH (Maternity/NICU/Pediatrics) [phone]   [facility] [phone]   [facility] [phone]   [facility] [phone]   [facility] [phone]   [facility] [phone]   [facility] [phone]   [facility] [phone]   [facility] [phone]   [facility] [phone]   [facility] [phone]   [facility] [phone]   [facility] [phone]

## 2024-07-16 NOTE — PROGRESS NOTES
"Progress Note  Nidhi Jeffries 58 y.o. female MRN: [id number]  Unit/Bed#: S -01 Encounter: [id number]    Assessment  58 F with a perforated gastric ulcer, s/p ex lap with modified Amor patch repair 7/10    Plan  -- FLD  - PPI twice daily  -- DVT ppx  - Goal     Subjective/Objective   NAOE. Pain controlled. Tolerating diet, no n/v. Having BM, having flatus. Voiding. Walking.    VSS on RA.  /85   Pulse 76   Temp 98.3 °F (36.8 °C)   Resp 14   Ht 5' 2\" (1.575 m)   Wt 89.8 kg (198 lb)   LMP 09/26/2020 (Exact Date)   SpO2 93%   BMI 36.21 kg/m²     Physical Exam:  General: NAD  CV: nl rate  Lungs: nl wob. No resp distress.  ABD: Soft, ND, mild diffuse tenderness to deep palpation  Extrem: No CCE  Incision: CDI    SYLVIA RLQ: SS      07/16/24 labs pending  Recent Labs     07/14/24  0514 07/15/24  0514   WBC 9.79 9.14   HGB 15.5* 14.7    283   SODIUM 135 135   K 3.8 3.7    103   CO2 25 25   BUN 10 10   CREATININE 0.51* 0.58*   GLUC 131 130   CALCIUM 9.4 8.9   EGFR 104 100            Diet Orders   (From admission, onward)                 Start     Ordered    07/15/24 1154  Diet Clear Liquid  Diet effective now        References:    Adult Nutrition Support Algorithm    RD Therapeutic Diet Order Protocol   Question Answer Comment   Diet Type Clear Liquid    RD to adjust diet per protocol? Yes        07/15/24 1154                    "

## 2024-07-16 NOTE — PROGRESS NOTES
"Progress Note  Nidhi Jeffries 58 y.o. female MRN: [id number]  Unit/Bed#: S -01 Encounter: [id number]    Assessment  58F with perforated gastric ulcer, s/p 7/10 exlap, modified Amor patch repair.    Plan  - advance diet to softs, d/c IVF  - d/c SYLVIA drain  - d/c home today if tolerating diet  - PPI bid  - DVT ppx  - PT/OT: III    Subjective/Objective   NAOE. Pain controlled. Tolerating FLD, no n/v. Having BMs, flatus. Voiding. Walking.    VSS on RA.  /84   Pulse 74   Temp 98.4 °F (36.9 °C)   Resp 16   Ht 5' 2\" (1.575 m)   Wt 89.8 kg (198 lb)   LMP 09/26/2020 (Exact Date)   SpO2 96%   BMI 36.21 kg/m²     Physical Exam:  General: NAD  CV: nl rate  Lungs: nl wob. No resp distress.  ABD: Soft, ND, NT, CDI. SYLVIA in place.  Extrem: No CCE    SYLVIA: 25cc ss  UOP: 500cc in 12h    Recent Labs     07/15/24  0514   WBC 9.14   HGB 14.7      SODIUM 135   K 3.7      CO2 25   BUN 10   CREATININE 0.58*   GLUC 130   CALCIUM 8.9   EGFR 100       "

## 2024-07-16 NOTE — PLAN OF CARE
Problem: PAIN - ADULT  Goal: Verbalizes/displays adequate comfort level or baseline comfort level  Description: Interventions:  - Encourage patient to monitor pain and request assistance  - Assess pain using appropriate pain scale  - Administer analgesics based on type and severity of pain and evaluate response  - Implement non-pharmacological measures as appropriate and evaluate response  - Consider cultural and social influences on pain and pain management  - Notify physician/advanced practitioner if interventions unsuccessful or patient reports new pain  Outcome: Progressing     Problem: INFECTION - ADULT  Goal: Absence or prevention of progression during hospitalization  Description: INTERVENTIONS:  - Assess and monitor for signs and symptoms of infection  - Monitor lab/diagnostic results  - Monitor all insertion sites, i.e. indwelling lines, tubes, and drains  - Monitor endotracheal if appropriate and nasal secretions for changes in amount and color  - [place] appropriate cooling/warming therapies per order  - Administer medications as ordered  - Instruct and encourage patient and family to use good hand hygiene technique  - Identify and instruct in appropriate isolation precautions for identified infection/condition  Outcome: Progressing  Goal: Absence of fever/infection during neutropenic period  Description: INTERVENTIONS:  - Monitor WBC    Outcome: Progressing     Problem: SAFETY ADULT  Goal: Patient will remain free of falls  Description: INTERVENTIONS:  - Educate patient/family on patient safety including physical limitations  - Instruct patient to call for assistance with activity   - Consult OT/PT to assist with strengthening/mobility   - Keep Call bell within reach  - Keep bed low and locked with side rails adjusted as appropriate  - Keep care items and personal belongings within reach  - Initiate and maintain comfort rounds  - Apply yellow socks and bracelet for high fall risk patients  - Consider  moving patient to room near nurses station  Outcome: Progressing  Goal: Maintain or return to baseline ADL function  Description: INTERVENTIONS:  -  Assess patient's ability to carry out ADLs; assess patient's baseline for ADL function and identify physical deficits which impact ability to perform ADLs (bathing, care of mouth/teeth, toileting, grooming, dressing, etc.)  - Assess/evaluate cause of self-care deficits   - Assess range of motion  - Assess patient's mobility; develop plan if impaired  - Assess patient's need for assistive devices and provide as appropriate  - Encourage maximum independence but intervene and supervise when necessary  - Involve family in performance of ADLs  - Assess for home care needs following discharge   - Consider OT consult to assist with ADL evaluation and planning for discharge  - Provide patient education as appropriate  Outcome: Progressing  Goal: Maintains/Returns to pre admission functional level  Description: INTERVENTIONS:  - Perform AM-PAC 6 Click Basic Mobility/ Daily Activity assessment daily.  - Set and communicate daily mobility goal to care team and patient/family/caregiver.   - Collaborate with rehabilitation services on mobility goals if consulted  - Out of bed for toileting  - Record patient progress and toleration of activity level   Outcome: Progressing     Problem: DISCHARGE PLANNING  Goal: Discharge to home or other facility with appropriate resources  Description: INTERVENTIONS:  - Identify barriers to discharge w/patient and caregiver  - Arrange for needed discharge resources and transportation as appropriate  - Identify discharge learning needs (meds, wound care, etc.)  - Arrange for interpretive services to assist at discharge as needed  - Refer to Case Management Department for coordinating discharge planning if the patient needs post-hospital services based on physician/advanced practitioner order or complex needs related to functional status, cognitive  ability, or social support system  Outcome: Progressing     Problem: Knowledge Deficit  Goal: Patient/family/caregiver demonstrates understanding of disease process, treatment plan, medications, and discharge instructions  Description: Complete learning assessment and assess knowledge base.  Interventions:  - Provide teaching at level of understanding  - Provide teaching via preferred learning methods  Outcome: Progressing     Problem: Nutrition/Hydration-ADULT  Goal: Nutrient/Hydration intake appropriate for improving, restoring or maintaining nutritional needs  Description: Monitor and assess patient's nutrition/hydration status for malnutrition. Collaborate with interdisciplinary team and initiate plan and interventions as ordered.  Monitor patient's weight and dietary intake as ordered or per policy. Utilize nutrition screening tool and intervene as necessary. Determine patient's food preferences and provide high-protein, high-caloric foods as appropriate.     INTERVENTIONS:  - Monitor oral intake, urinary output, labs, and treatment plans  - Assess nutrition and hydration status and recommend course of action  - Evaluate amount of meals eaten  - Assist patient with eating if necessary   - Allow adequate time for meals  - Recommend/ encourage appropriate diets, oral nutritional supplements, and vitamin/mineral supplements  - Order, calculate, and assess calorie counts as needed  - Recommend, monitor, and adjust tube feedings and TPN/PPN based on assessed needs  - Assess need for intravenous fluids  - Provide specific nutrition/hydration education as appropriate  - Include patient/family/caregiver in decisions related to nutrition  Outcome: Progressing

## 2024-07-16 NOTE — PLAN OF CARE
Problem: OCCUPATIONAL THERAPY ADULT  Goal: Performs self-care activities at highest level of function for planned discharge setting.  See evaluation for individualized goals.  Description: Treatment Interventions: ADL retraining, Functional transfer training, UE strengthening/ROM, Endurance training, Patient/family training, Equipment evaluation/education, Activityengagement, Energy conservation          See flowsheet documentation for full assessment, interventions and recommendations.   Outcome: Adequate for Discharge  Note: Limitation: Decreased ADL status, Decreased UE strength, Decreased endurance, Decreased self-care trans, Decreased high-level ADLs (decreased balance and mobility)  Prognosis: Good  Assessment: Pt seen for OT treatment on 7/16 s/p admit to [facility] w/ a perforated gastric ulcer, s/p ex lap with modified Amor patch repair 7/10 . Pt agreeable to OT treatment session, upon arrival patient was found supine in bed. Patient participated in skilled OT interventions to address compensatory techniques for ADL tasks, functional transfers, standing balance/tolerance and overall activity tolerance and participation. In comparison to previous session, Nidhi demonstrated significant improvements in pain control, activity tolerance, functional reach, standing balance/tolerance and requires significantly less physical A to perform ADLs/ADL transfers but is continuing to perform below baseline due to pain limiting speed/performance of ADLs and need for AD for mobility. Pt demonstrated good carryover of compensatory techniques and educated on techniques for home. Pt denies need for further OT services at this time and has met all goals set for pt upon evaluation. Pt reports only concern is completing MADHURI to upstairs bathroom (PT notified). Will discharge from caseload.     Rehab Resource Intensity Level, OT: No post-acute rehabilitation needs (increased social support for IADL completion)     Janelle Fitzpatrick  OTD, OTR/L  [place] License [id number]  [place] License [id number]

## 2024-07-16 NOTE — TELEPHONE ENCOUNTER
----- Message from Funmi Hanley PA-C sent at 7/16/2024  1:51 PM EDT -----  Ready to schedule! TY  ----- Message -----  From: Emmanuel Larsen MD  Sent: 7/15/2024   2:17 PM EDT  To: Funmi Hanley PA-C; #    Okay. SLR would be okay if Garza can scope there or can be scheduled at [facility].   Thanks.   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  Masslike proximal stomach opacity. Hematoma versus submucosal mass such as leiomyoma or GIST.      Prior endoscopy : None.     Plan :   1. EUS with FNA in 8 weeks.       Order placed : YES/NO: no  ----- Message -----  From: Zenon Norman PA-C  Sent: 7/11/2024   9:59 AM EDT  To: [facility]    Patient is currently admitted at [facility], please arrange for outpatient EUS with FNA of gastric submucosal mass in 6 to 8 weeks.  She says she is located by [facility] so would prefer to be scheduled there if possible, but can be scheduled at [place] if needed.  Thank you.

## 2024-07-17 VITALS
BODY MASS INDEX: 36.44 KG/M2 | OXYGEN SATURATION: 97 % | WEIGHT: 198 LBS | HEART RATE: 83 BPM | DIASTOLIC BLOOD PRESSURE: 92 MMHG | TEMPERATURE: 98.2 F | SYSTOLIC BLOOD PRESSURE: 129 MMHG | RESPIRATION RATE: 17 BRPM | HEIGHT: 62 IN

## 2024-07-17 PROBLEM — K25.5 PERFORATED GASTRIC ULCER (HCC): Status: ACTIVE | Noted: 2024-07-17

## 2024-07-17 PROCEDURE — 99024 POSTOP FOLLOW-UP VISIT: CPT | Performed by: SURGERY

## 2024-07-17 PROCEDURE — NC001 PR NO CHARGE: Performed by: SURGERY

## 2024-07-17 RX ORDER — PANTOPRAZOLE SODIUM 40 MG/1
40 TABLET, DELAYED RELEASE ORAL 2 TIMES DAILY
Qty: 60 TABLET | Refills: 0 | Status: SHIPPED | OUTPATIENT
Start: 2024-07-17 | End: 2024-08-16

## 2024-07-17 RX ORDER — POTASSIUM CHLORIDE 20 MEQ/1
40 TABLET, EXTENDED RELEASE ORAL ONCE
Status: COMPLETED | OUTPATIENT
Start: 2024-07-17 | End: 2024-07-17

## 2024-07-17 RX ADMIN — ACETAMINOPHEN 975 MG: 325 TABLET, FILM COATED ORAL at 11:19

## 2024-07-17 RX ADMIN — HEPARIN SODIUM 5000 UNITS: 5000 INJECTION INTRAVENOUS; SUBCUTANEOUS at 13:45

## 2024-07-17 RX ADMIN — ACETAMINOPHEN 975 MG: 325 TABLET, FILM COATED ORAL at 02:42

## 2024-07-17 RX ADMIN — PANTOPRAZOLE SODIUM 40 MG: 40 INJECTION, POWDER, FOR SOLUTION INTRAVENOUS at 08:46

## 2024-07-17 RX ADMIN — POTASSIUM CHLORIDE 40 MEQ: 1500 TABLET, EXTENDED RELEASE ORAL at 11:19

## 2024-07-17 RX ADMIN — HEPARIN SODIUM 5000 UNITS: 5000 INJECTION INTRAVENOUS; SUBCUTANEOUS at 05:21

## 2024-07-17 RX ADMIN — Medication 15 ML: at 08:47

## 2024-07-17 NOTE — DISCHARGE INSTR - AVS FIRST PAGE
Soft diet    See attachments (essentially avoid steak, pork chops, salads, roughage for 1 month.)    Anticipate on continuing Protonix or acid reducing agent forever.    Please call the office when you leave to schedule an appointment for 2 weeks. Please call [phone]. Please note that the number provided is a call center. You will need to know your physician's name in order to create a follow-up visit. Also note that the office is not located on the [facility].  [address], [address], [place], [place] [facility]. [address] of [address] between [address] and [address].     Activity:    May lift 10 lb as many times as desired the 1st week,       20 lb in 2 weeks,       30 lb in 3 weeks.                Walking is encouraged  Normal daily activities including climbing steps are okay  Do not engage in strenuous activity ( sit-ups or crutches) or contact sports for 4-6 weeks post-operatively    Return to Work:   Okay to return to work when you feel well if you desire.        Diet:   You may return to your normal healthy diet.    Wound Care:  Your wound is closed with staples. These will be removed at your outpatient visit.  It is okay to shower. Wash incision gently with soap and water and pat dry. Do not soak incisions in bath water or swim for two weeks. Do not apply any creams or ointments.    Pain Medication:   Can use tylenol as needed for pain. Avoid NSAIDs (ie advil/motrin) from now on. Would also stop the ASA 81 mg for 3 months.    Other:  It is normal to developed a “healing ridge” / firm incision after surgery.  This is your body making scar tissue.  It is a good sign  Constipation is very common after general anesthesia.  Please use milk of magnesia as needed in order to help prevent constipation.  It is normal to get bruising after surgery.  If you have questions after discharge please call the office.    If you have increased pain, fever >101.5, increased drainage, redness or a bad smell at your  surgery site, please call us immediately or come directly to the Emergency Room

## 2024-07-17 NOTE — UTILIZATION REVIEW
Continued Stay Review    Date: 07/17/24                          Current Patient Class: IP  Current Level of Care: Med/Surg    HPI:58 y.o. female initially admitted on 7/10 for perforated gastric ulcer s/p exlap w/ modified Amor patch repair.     Assessment/Plan: Pt reports pain is controlled. Tolerating full liquid diet. Having BM and flatus. On exam, SYLVIA in abdomen w/ 25 mL serosanguinous output. Plan: advance diet to soft foods today, discontinue IVF, discontinue SYLVIA drain. PPI BID. DVT ppx.     Vital Signs (last 3 days)       Date/Time Temp Pulse Resp BP MAP (mmHg) SpO2 O2 Device [place] Coma Scale Score Pain    07/17/24 0800 -- -- -- -- -- -- None (Room air) 15 No Pain    07/17/24 07:34:23 98.5 °F (36.9 °C) 69 17 133/83 100 95 % -- -- --    07/17/24 0242 -- -- -- -- -- -- -- -- 2    07/16/24 2000 -- -- -- -- -- -- -- 15 --    07/16/24 1953 -- -- -- -- -- -- -- -- No Pain    07/16/24 19:35:28 98.4 °F (36.9 °C) 74 -- 142/84 103 96 % -- -- --    07/16/24 15:37:11 98.3 °F (36.8 °C) 84 16 132/89 103 97 % -- -- --    07/16/24 1225 -- -- -- -- -- -- -- -- 2       Pertinent Labs/Diagnostic Results:     Results from last 7 days   Lab Units 07/15/24  0514 07/14/24  0514 07/13/24  0732 07/12/24  0741 07/11/24  0519   WBC Thousand/uL 9.14 9.79 13.87* 16.52* 16.56*   HEMOGLOBIN g/dL 14.7 15.5* 13.8 13.0 13.2   HEMATOCRIT % 43.6 45.7 41.1 39.6 39.4   PLATELETS Thousands/uL 283 296 244 225 199   TOTAL NEUT ABS Thousands/µL 5.36 6.66 10.76* 14.52* 14.79*         Results from last 7 days   Lab Units 07/15/24  0514 07/14/24  0514 07/13/24  0732 07/12/24  0741 07/12/24  0617 07/11/24  0519   SODIUM mmol/L 135 135 138  --  134* 136   POTASSIUM mmol/L 3.7 3.8 3.6  --  4.3 4.2   CHLORIDE mmol/L 103 103 104  --  104 105   CO2 mmol/L 25 25 24  --  24 26   ANION GAP mmol/L 7 7 10  --  6 5   BUN mg/dL 10 10 11  --  18 17   CREATININE mg/dL 0.58* 0.51* 0.46*  --  0.57* 0.61   EGFR ml/min/1.73sq m 100 104 108  --  101 98   CALCIUM mg/dL  8.9 9.4 9.0  --  8.3* 8.5   MAGNESIUM mg/dL  --   --   --  2.1  --  1.9   PHOSPHORUS mg/dL  --   --   --   --   --  3.2             Results from last 7 days   Lab Units 07/15/24  0514 07/14/24  0514 07/13/24  0732 07/12/24  0617 07/11/24  0519   GLUCOSE RANDOM mg/dL 130 131 85 99 127       Medications:   Scheduled Medications:  acetaminophen, 975 mg, Oral, Q6H SHELIA  fluconazole, 400 mg, Intravenous, Q24H  heparin (porcine), 5,000 Units, Subcutaneous, Q8H SHELIA  melatonin, 3 mg, Oral, HS  Multivitamin, 15 mL, Per NG Tube, Daily  pantoprazole, 40 mg, Intravenous, Q12H SHELIA    Continuous IV Infusions: none    PRN Meds:  benzocaine, 2 spray, Mucosal, Q6H PRN  HYDROmorphone, 0.2 mg, Intravenous, Q3H PRN  naloxone, 0.04 mg, Intravenous, Q1MIN PRN  ondansetron, 4 mg, Intravenous, Q4H PRN  oxyCODONE, 5 mg, Oral, Q4H PRN  oxyCODONE, 2.5 mg, Oral, Q4H PRN        Discharge Plan: TBD, pending tolerance of advanced diet    Network Utilization Review Department  ATTENTION: Please call with any questions or concerns to [phone] and carefully listen to the prompts so that you are directed to the right person. All voicemails are confidential.   For Discharge needs, contact Care Management DC Support Team at [phone] opt. 2  Send all requests for admission clinical reviews, approved or denied determinations and any other requests to dedicated fax number below belonging to the campus where the patient is receiving treatment. List of dedicated fax numbers for the Facilities:  FACILITY NAME UR FAX NUMBER   ADMISSION DENIALS (Administrative/Medical Necessity) [phone]   DISCHARGE SUPPORT TEAM (NETWORK) [phone]   PARENT CHILD HEALTH (Maternity/NICU/Pediatrics) [phone]   [facility] [phone]   [facility] [phone]   [facility] [phone]   [facility] [phone]   [facility]  [phone]   [facility] [phone]   [facility] [phone]   [facility] [phone]   [facility] [phone]   [facility] [phone]   [facility] [phone]   [facility] [phone]

## 2024-07-17 NOTE — CASE MANAGEMENT
Case Management Discharge Planning Note    Patient name Nidhi Jeffries  Location S /S -01 MRN [id number]  : 1965 Date 2024       Current Admission Date: 7/10/2024  Current Admission Diagnosis:Perforated gastric ulcer (HCC)   Patient Active Problem List    Diagnosis Date Noted Date Diagnosed    Perforated gastric ulcer (HCC) 2024     S/P exploratory laparotomy 2024     Bilateral carpal tunnel syndrome 2023     Paresthesia of hand, bilateral 2023     Bilateral hand pain 2023     Intervertebral disc disorder with radiculopathy of lumbar region      Postmenopausal bleeding 10/23/2019     Low libido 10/23/2019     Generalized osteoarthritis 2019     Lumbar stenosis with neurogenic claudication 2018     Hyperlipidemia 2017     Lump, breast 2015     Essential hypertension 10/07/2013       LOS (days): 7  Geometric Mean LOS (GMLOS) (days):   Days to GMLOS:     OBJECTIVE:  Risk of Unplanned Readmission Score: 6.54         Current admission status: Inpatient   Preferred Pharmacy:   [id number] - [place], [place] - [address]  [address]  [place] [address]  Phone: [phone] Fax: [phone]    Primary Care Provider: Arnaud Mariee DO    Primary Insurance: AETNA  Secondary Insurance:     DISCHARGE DETAILS:    Discharge planning discussed with:: patient at bedside  Freedom of Choice: Yes  Comments - Freedom of Choice: resources, DME    Requested Home Health Care         Is the patient interested in HHC at discharge?: No    DME Referral Provided  Referral made for DME?: Yes  DME referral completed for the following items:: Bedside Commode  DME Supplier Name:: [employer]    Other Referral/Resources/Interventions Provided:  Interventions: Other (Specify), DME (Find Help)  Referral Comments: CM met with patient at bedside, per patient request. Patient requesting resources re: SSDI and any income assistance. Patient also  requesting a BSC. Patient aware that she has no need at d/c in regards to therapy.     BSC ordered via [place]- delivered to bedside, delivery slip signed- patient copy provided  and original placed in [place] folder on S2. Recourses provided to patient from [facility] help. No further CM needs.    Would you like to participate in our [facility] service program?  : No - Declined    Treatment Team Recommendation: Home  Discharge Destination Plan:: Home  Transport at Discharge : Family

## 2024-07-17 NOTE — PLAN OF CARE
Problem: PAIN - ADULT  Goal: Verbalizes/displays adequate comfort level or baseline comfort level  Description: Interventions:  - Encourage patient to monitor pain and request assistance  - Assess pain using appropriate pain scale  - Administer analgesics based on type and severity of pain and evaluate response  - Implement non-pharmacological measures as appropriate and evaluate response  - Consider cultural and social influences on pain and pain management  - Notify physician/advanced practitioner if interventions unsuccessful or patient reports new pain  Outcome: Progressing     Problem: INFECTION - ADULT  Goal: Absence or prevention of progression during hospitalization  Description: INTERVENTIONS:  - Assess and monitor for signs and symptoms of infection  - Monitor lab/diagnostic results  - Monitor all insertion sites, i.e. indwelling lines, tubes, and drains  - Monitor endotracheal if appropriate and nasal secretions for changes in amount and color  - [place] appropriate cooling/warming therapies per order  - Administer medications as ordered  - Instruct and encourage patient and family to use good hand hygiene technique  - Identify and instruct in appropriate isolation precautions for identified infection/condition  Outcome: Progressing  Goal: Absence of fever/infection during neutropenic period  Description: INTERVENTIONS:  - Monitor WBC    Outcome: Progressing     Problem: SAFETY ADULT  Goal: Patient will remain free of falls  Description: INTERVENTIONS:  - Educate patient/family on patient safety including physical limitations  - Instruct patient to call for assistance with activity   - Consult OT/PT to assist with strengthening/mobility   - Keep Call bell within reach  - Keep bed low and locked with side rails adjusted as appropriate  - Keep care items and personal belongings within reach  - Initiate and maintain comfort rounds  - Make Fall Risk Sign visible to staff  - Apply yellow socks and bracelet  for high fall risk patients  - Consider moving patient to room near nurses station  Outcome: Progressing  Goal: Maintain or return to baseline ADL function  Description: INTERVENTIONS:  -  Assess patient's ability to carry out ADLs; assess patient's baseline for ADL function and identify physical deficits which impact ability to perform ADLs (bathing, care of mouth/teeth, toileting, grooming, dressing, etc.)  - Assess/evaluate cause of self-care deficits   - Assess range of motion  - Assess patient's mobility; develop plan if impaired  - Assess patient's need for assistive devices and provide as appropriate  - Encourage maximum independence but intervene and supervise when necessary  - Involve family in performance of ADLs  - Assess for home care needs following discharge   - Consider OT consult to assist with ADL evaluation and planning for discharge  - Provide patient education as appropriate  Outcome: Progressing  Goal: Maintains/Returns to pre admission functional level  Description: INTERVENTIONS:  - Perform AM-PAC 6 Click Basic Mobility/ Daily Activity assessment daily.  - Set and communicate daily mobility goal to care team and patient/family/caregiver.   - Collaborate with rehabilitation services on mobility goals if consulted  - Out of bed for toileting  - Record patient progress and toleration of activity level   Outcome: Progressing     Problem: DISCHARGE PLANNING  Goal: Discharge to home or other facility with appropriate resources  Description: INTERVENTIONS:  - Identify barriers to discharge w/patient and caregiver  - Arrange for needed discharge resources and transportation as appropriate  - Identify discharge learning needs (meds, wound care, etc.)  - Arrange for interpretive services to assist at discharge as needed  - Refer to Case Management Department for coordinating discharge planning if the patient needs post-hospital services based on physician/advanced practitioner order or complex needs  related to functional status, cognitive ability, or social support system  Outcome: Progressing     Problem: Knowledge Deficit  Goal: Patient/family/caregiver demonstrates understanding of disease process, treatment plan, medications, and discharge instructions  Description: Complete learning assessment and assess knowledge base.  Interventions:  - Provide teaching at level of understanding  - Provide teaching via preferred learning methods  Outcome: Progressing     Problem: Nutrition/Hydration-ADULT  Goal: Nutrient/Hydration intake appropriate for improving, restoring or maintaining nutritional needs  Description: Monitor and assess patient's nutrition/hydration status for malnutrition. Collaborate with interdisciplinary team and initiate plan and interventions as ordered.  Monitor patient's weight and dietary intake as ordered or per policy. Utilize nutrition screening tool and intervene as necessary. Determine patient's food preferences and provide high-protein, high-caloric foods as appropriate.     INTERVENTIONS:  - Monitor oral intake, urinary output, labs, and treatment plans  - Assess nutrition and hydration status and recommend course of action  - Evaluate amount of meals eaten  - Assist patient with eating if necessary   - Allow adequate time for meals  - Recommend/ encourage appropriate diets, oral nutritional supplements, and vitamin/mineral supplements  - Order, calculate, and assess calorie counts as needed  - Recommend, monitor, and adjust tube feedings and TPN/PPN based on assessed needs  - Assess need for intravenous fluids  - Provide specific nutrition/hydration education as appropriate  - Include patient/family/caregiver in decisions related to nutrition  Outcome: Progressing

## 2024-07-17 NOTE — QUICK NOTE
Patient in semi-recumbent position. SYLVIA drain site inspected, and was CDI, mild erythema, no warmth, or drainage. SYLVIA was taken off suction. Suture was cut. Drain was removed in a smooth, consistent fashion. Covered with gauze/tape. Patient tolerated well.

## 2024-07-17 NOTE — PHYSICAL THERAPY NOTE
Physical Therapy Cancellation Note       07/17/24 1425   Note Type   Note Type Cancelled Session   Cancel Reasons Other   Assessment   Assessment attempted to see pt for PT intervention. initiated seeing pt for mobilization but session was interupted by another service. will follow and continue PT as medically appropriate and schedule allows.     Romero Perdomo, PT

## 2024-07-18 ENCOUNTER — TELEPHONE (OUTPATIENT)
Age: 59
End: 2024-07-18

## 2024-07-18 ENCOUNTER — PREP FOR PROCEDURE (OUTPATIENT)
Dept: GASTROENTEROLOGY | Facility: CLINIC | Age: 59
End: 2024-07-18

## 2024-07-18 DIAGNOSIS — K31.89 GASTRIC MASS: Primary | ICD-10-CM

## 2024-07-18 NOTE — TELEPHONE ENCOUNTER
Patients GI provider:  [facility]    Number to return call: ([phone]    Reason for call: Pt calling to schedule EUS with FNA of gastric submucosal mass 6-8 weeks as per chart notation. Please return patients call.    2 week follow up scheduled with assistance from nursing team due to no availability at [facility] office.

## 2024-07-18 NOTE — TELEPHONE ENCOUNTER
Procedure: EUS  Scheduled date of procedure (as of today): 9/13/24  Physician performing procedure: Dr. Garza  Location of procedure: [place]   Instructions reviewed with patient by:  Misty - mailed   Clearances:  n/a

## 2024-07-18 NOTE — UTILIZATION REVIEW
NOTIFICATION OF ADMISSION DISCHARGE   This is a Notification of Discharge from [facility]. Please be advised that this patient has been discharge from our facility. Below you will find the admission and discharge date and time including the patient’s disposition.   UTILIZATION REVIEW CONTACT:  Carmina Bruner  Utilization   Network Utilization Review Department  Phone: [phone] carefully listen to the prompts. All voicemails are confidential.  Email: [email or web address]     ADMISSION INFORMATION  PRESENTATION DATE: 7/10/2024  5:29 AM  OBERVATION ADMISSION DATE: N/A  INPATIENT ADMISSION DATE: 7/10/24  7:43 AM   DISCHARGE DATE: 7/17/2024  6:10 PM   DISPOSITION:Home/Self Care    Network Utilization Review Department  ATTENTION: Please call with any questions or concerns to [phone] and carefully listen to the prompts so that you are directed to the right person. All voicemails are confidential.   For Discharge needs, contact Care Management DC Support Team at [phone] opt. [phone]  Send all requests for admission clinical reviews, approved or denied determinations and any other requests to dedicated fax number below belonging to the campus where the patient is receiving treatment. List of dedicated fax numbers for the Facilities:  FACILITY NAME UR FAX NUMBER   ADMISSION DENIALS (Administrative/Medical Necessity) [phone]   DISCHARGE SUPPORT TEAM ([facility]) [phone]   PARENT CHILD HEALTH (Maternity/NICU/Pediatrics) [phone]   [facility] [phone]   [facility] [phone]   [facility] [phone]   [facility] [phone]   [facility] [phone]   [facility] [phone]   [facility] [phone]   [facility] [phone]    [facility] [phone]   [facility] [phone]   [facility] [phone]   [facility] [phone]

## 2024-07-19 LAB
DME PARACHUTE DELIVERY DATE ACTUAL: NORMAL
DME PARACHUTE DELIVERY DATE REQUESTED: NORMAL
DME PARACHUTE ITEM DESCRIPTION: NORMAL
DME PARACHUTE ORDER STATUS: NORMAL
DME PARACHUTE SUPPLIER NAME: NORMAL
DME PARACHUTE SUPPLIER PHONE: NORMAL

## 2024-07-20 NOTE — DISCHARGE SUMMARY
Discharge Summary - General Surgery  Nidhi Jeffries 58 y.o. female MRN: [id number]  Unit/Bed#: S -01 Encounter: [id number]    Admission Date: 7/10/2024     Discharge Date: 2024     Admitting Diagnosis: Acute gastric ulcer with perforation (HCC) [K25.1]  Abdominal pain [R10.9]    Discharge Diagnosis: Principal Problem:    Perforated gastric ulcer (HCC)  Active Problems:    Lumbar stenosis with neurogenic claudication    S/P exploratory laparotomy  Resolved Problems:    * No resolved hospital problems. *      Attending and Service:   Luis Alberto Lopez    Procedures Performed:   Exploratory laparotomy, leah patch repair of perforated gastric ulcer with evidence of an anterior linear ulcer.    Imagin/10 CTAP: gastric antral and pyloric edematous wall thickening,         Hospital Course:   Nidhi Jeffries 58 y.o. female with a GERD, HTN, , taking meloxicam and aspirin daily, presented with sudden abdominal pain/tenderness on 7/10, found to have a perforated gastric antral/pyloric ulcer. She was treated with an emergent laparotomy and leah patch repair. She was also treated with broad-spectrum (Zoysn) antibiotics and therapeutic PPI. She was instructed to stop taking meloxicam and asked to hold her aspirin until follow-up.    Patient was discharged on HD#7/POD7. On the day of discharge, the patient had bowel function, was voiding spontaneously, ambulating at baseline, and pain was well controlled. The patient was sent home with medication for pain control. She understood all instructions for discharge. She was also given the names and numbers of the providers as well as instructions for follow up appointments.     Condition at Discharge: good     Discharge instructions/Information to patient and family:   See after visit summary for information provided to patient and family.      Provisions for Follow-Up Care:  See after visit summary for information related to follow-up care and any pertinent  home health orders.      Disposition: Home    Planned Readmission: No    Discharge Statement   I spent 30 minutes discharging the patient. This time was spent on the day of discharge. I had direct contact with the patient on the day of discharge. Additional documentation is required if more than 30 minutes were spent on discharge.     Discharge Medications:  See after visit summary for reconciled discharge medications provided to patient and family.      Sampson Song MD  7/19/2024  8:14 PM

## 2024-07-31 ENCOUNTER — OFFICE VISIT (OUTPATIENT)
Dept: SURGERY | Facility: CLINIC | Age: 59
End: 2024-07-31

## 2024-07-31 DIAGNOSIS — K31.89 GASTRIC MASS: ICD-10-CM

## 2024-07-31 DIAGNOSIS — K27.5 PERFORATED ULCER (HCC): Primary | ICD-10-CM

## 2024-07-31 PROCEDURE — 99024 POSTOP FOLLOW-UP VISIT: CPT | Performed by: SURGERY

## 2024-07-31 NOTE — PROGRESS NOTES
Assessment/Plan:    Diagnoses and all orders for this visit:    Perforated ulcer (HCC)    Perforated antral gastric ulcer.  H. pylori was negative.  Repaired with a Amor patch.  Remains on Protonix 40 mg daily.  May resume diet as tolerated.  Will plan for her to return to work as of September 3.    Gastric mass    Noted on initial CT and palpated the time of surgery.  However given inflammation in the perforated ulcer, did not proceed with a removal.  She does have follow-up with GI for endoscopy and likely endoscopic ultrasound with biopsy.  May very well need some type of surgical removal.  If so, would seek the assistance of surgical oncology    Pathology: Reviewed with patient, all questions answered.       Postoperative restrictions reviewed. All questions answered.       ______________________________________________________  HPI: S/p exploratory lap with Amor patch - 7/10/24    Final Diagnosis  A. Stomach (biopsy):  - Reactive antral gastropathy with adjacent duodenal/pylorus mucosa  - No H pylori identified on immunohistochemistry stain  - Negative for dysplasia     Comment:  - Alcian blue/PAS highlights duodenal/pylorus mucosa.   - Suggest clinical and radiologic correlation to ensure biopsy is representative of the lesion of interest.      Interpretation performed at [facility]- [address] [address]    Incisional soreness as expected.  Tolerating foods.  Presently on Protonix twice a day and may decrease to 40 mg daily               ROS:  General ROS: negative for - chills, fatigue, fever or night sweats, weight loss  Respiratory ROS: no cough, shortness of breath, or wheezing  Cardiovascular ROS: no chest pain or dyspnea on exertion  Genito-Urinary ROS: no dysuria, trouble voiding, or hematuria  Musculoskeletal ROS: negative for - gait disturbance, joint pain or muscle pain  Neurological ROS: no TIA or stroke symptoms  GI ROS: see HPI  Skin ROS: no new rashes or lesions    Lymphatic ROS: no new adenopathy noted by pt.   GYN ROS: see HPI, no new GYN history or bleeding noted  Psy ROS: no new mental or behavioral disturbances         Patient Active Problem List   Diagnosis    Lumbar stenosis with neurogenic claudication    Essential hypertension    Generalized osteoarthritis    Hyperlipidemia    Lump, breast    Postmenopausal bleeding    Low libido    Intervertebral disc disorder with radiculopathy of lumbar region    Bilateral carpal tunnel syndrome    Paresthesia of hand, bilateral    Bilateral hand pain    S/P exploratory laparotomy    Perforated ulcer (HCC)    Gastric mass       Allergies:  Sulfa antibiotics      Current Outpatient Medications:     Acetaminophen (TYLENOL ARTHRITIS PAIN PO), Take by mouth, Disp: , Rfl:     amLODIPine-benazepril (LOTREL 5-20) 5-20 MG per capsule, Take 1 capsule by mouth daily, Disp: , Rfl:     atorvastatin (LIPITOR) 20 mg tablet, , Disp: , Rfl:     gabapentin (NEURONTIN) 300 mg capsule, TAKE ONE CAPSULE BY MOUTH ONCE DAILY at bedtime, Disp: 30 capsule, Rfl: 5    Multiple Vitamins-Minerals (CENTRUM SILVER PO), Take by mouth, Disp: , Rfl:     pantoprazole (PROTONIX) 40 mg tablet, Take 1 tablet (40 mg total) by mouth 2 (two) times a day, Disp: 60 tablet, Rfl: 0    spironolactone (ALDACTONE) 25 mg tablet, , Disp: , Rfl:     Past Medical History:   Diagnosis Date    Hypertension     Rheumatoid arthritis (HCC)        Past Surgical History:   Procedure Laterality Date     SECTION      LAPAROTOMY N/A 7/10/2024    Procedure: LAPAROTOMY EXPLORATORY, MODIFIED KYRIE PATCH;  Surgeon: Luis Alberto Lopez DO;  [facility];  Service: General       Family History   Problem Relation Age of Onset    Heart disease Mother     Hypertension Mother     Diabetes Mother     No Known Problems Father     No Known Problems Maternal Grandmother     No Known Problems Maternal Grandfather     No Known Problems Paternal Grandmother     Prostate cancer Paternal  Grandfather 70    No Known Problems Brother     No Known Problems Brother     No Known Problems Brother     No Known Problems Son     Breast cancer Maternal Aunt 70    Ovarian cancer Paternal Aunt     Endometrial cancer Paternal Aunt 36    No Known Problems Family     Colon cancer Neg Hx         reports that she has quit smoking. She has never used smokeless tobacco. She reports that she does not currently use alcohol. She reports that she does not use drugs.    PHYSICAL EXAM    LMP 09/26/2020 (Exact Date)     General: normal, cooperative, no distress  Abdominal: soft, nondistended, or nontender  Incision: clean, dry, and intact and healing well.  Well-healed.  Staples removed      Luis Alberto Lopez,     Date: 7/31/2024 Time: 4:52 PM

## 2024-07-31 NOTE — LETTER
July 31, 2024     Arnaud Nicholas, DO  [address] [facility] [address]    Patient: Nidhi Jeffries   YOB: 1965   Date of Visit: 7/31/2024       Dear Dr. Nicholas:    Thank you for referring Nidhi Jeffries to me for evaluation. Below are my notes for this consultation.    If you have questions, please do not hesitate to call me. I look forward to following your patient along with you.         Sincerely,        Luis Alberto Lopez DO        CC: No Recipients    Luis Alberto Lopez DO  7/31/2024  4:53 PM  Sign when Signing Visit  Assessment/Plan:    Diagnoses and all orders for this visit:    Perforated ulcer (HCC)    Perforated antral gastric ulcer.  H. pylori was negative.  Repaired with a Amor patch.  Remains on Protonix 40 mg daily.  May resume diet as tolerated.  Will plan for her to return to work as of September 3.    Gastric mass    Noted on initial CT and palpated the time of surgery.  However given inflammation in the perforated ulcer, did not proceed with a removal.  She does have follow-up with GI for endoscopy and likely endoscopic ultrasound with biopsy.  May very well need some type of surgical removal.  If so, would seek the assistance of surgical oncology    Pathology: Reviewed with patient, all questions answered.       Postoperative restrictions reviewed. All questions answered.       ______________________________________________________  HPI: S/p exploratory lap with Amor patch - 7/10/24    Final Diagnosis  A. Stomach (biopsy):  - Reactive antral gastropathy with adjacent duodenal/pylorus mucosa  - No H pylori identified on immunohistochemistry stain  - Negative for dysplasia     Comment:  - Alcian blue/PAS highlights duodenal/pylorus mucosa.   - Suggest clinical and radiologic correlation to ensure biopsy is representative of the lesion of interest.      Interpretation performed at [facility], [address] [place] [address], [place] [address]    Incisional soreness as expected.   Tolerating foods.  Presently on Protonix twice a day and may decrease to 40 mg daily               ROS:  General ROS: negative for - chills, fatigue, fever or night sweats, weight loss  Respiratory ROS: no cough, shortness of breath, or wheezing  Cardiovascular ROS: no chest pain or dyspnea on exertion  Genito-Urinary ROS: no dysuria, trouble voiding, or hematuria  Musculoskeletal ROS: negative for - gait disturbance, joint pain or muscle pain  Neurological ROS: no TIA or stroke symptoms  GI ROS: see HPI  Skin ROS: no new rashes or lesions   Lymphatic ROS: no new adenopathy noted by pt.   GYN ROS: see HPI, no new GYN history or bleeding noted  Psy ROS: no new mental or behavioral disturbances         Patient Active Problem List   Diagnosis   • Lumbar stenosis with neurogenic claudication   • Essential hypertension   • Generalized osteoarthritis   • Hyperlipidemia   • Lump, breast   • Postmenopausal bleeding   • Low libido   • Intervertebral disc disorder with radiculopathy of lumbar region   • Bilateral carpal tunnel syndrome   • Paresthesia of hand, bilateral   • Bilateral hand pain   • S/P exploratory laparotomy   • Perforated ulcer (HCC)   • Gastric mass       Allergies:  Sulfa antibiotics      Current Outpatient Medications:   •  Acetaminophen (TYLENOL ARTHRITIS PAIN PO), Take by mouth, Disp: , Rfl:   •  amLODIPine-benazepril (LOTREL 5-20) 5-20 MG per capsule, Take 1 capsule by mouth daily, Disp: , Rfl:   •  atorvastatin (LIPITOR) 20 mg tablet, , Disp: , Rfl:   •  gabapentin (NEURONTIN) 300 mg capsule, TAKE ONE CAPSULE BY MOUTH ONCE DAILY at bedtime, Disp: 30 capsule, Rfl: 5  •  Multiple Vitamins-Minerals (CENTRUM SILVER PO), Take by mouth, Disp: , Rfl:   •  pantoprazole (PROTONIX) 40 mg tablet, Take 1 tablet (40 mg total) by mouth 2 (two) times a day, Disp: 60 tablet, Rfl: 0  •  spironolactone (ALDACTONE) 25 mg tablet, , Disp: , Rfl:     Past Medical History:   Diagnosis Date   • Hypertension    • Rheumatoid  arthritis (HCC)        Past Surgical History:   Procedure Laterality Date   •  SECTION     • LAPAROTOMY N/A 7/10/2024    Procedure: LAPAROTOMY EXPLORATORY, MODIFIED KYRIE PATCH;  Surgeon: Luis Alberto Lopez DO;  [facility];  Service: General       Family History   Problem Relation Age of Onset   • Heart disease Mother    • Hypertension Mother    • Diabetes Mother    • No Known Problems Father    • No Known Problems Maternal Grandmother    • No Known Problems Maternal Grandfather    • No Known Problems Paternal Grandmother    • Prostate cancer Paternal Grandfather 70   • No Known Problems Brother    • No Known Problems Brother    • No Known Problems Brother    • No Known Problems Son    • Breast cancer Maternal Aunt 70   • Ovarian cancer Paternal Aunt    • Endometrial cancer Paternal Aunt 36   • No Known Problems Family    • Colon cancer Neg Hx         reports that she has quit smoking. She has never used smokeless tobacco. She reports that she does not currently use alcohol. She reports that she does not use drugs.    PHYSICAL EXAM    LMP 2020 (Exact Date)     General: normal, cooperative, no distress  Abdominal: soft, nondistended, or nontender  Incision: clean, dry, and intact and healing well.  Well-healed.  Staples removed      Luis Alberto Lopez DO    Date: 2024 Time: 4:52 PM

## 2024-08-01 ENCOUNTER — OFFICE VISIT (OUTPATIENT)
Dept: GASTROENTEROLOGY | Facility: AMBULARY SURGERY CENTER | Age: 59
End: 2024-08-01
Payer: COMMERCIAL

## 2024-08-01 VITALS
DIASTOLIC BLOOD PRESSURE: 74 MMHG | SYSTOLIC BLOOD PRESSURE: 134 MMHG | HEIGHT: 62 IN | OXYGEN SATURATION: 97 % | WEIGHT: 187 LBS | BODY MASS INDEX: 34.41 KG/M2 | HEART RATE: 83 BPM

## 2024-08-01 DIAGNOSIS — K25.1 ACUTE GASTRIC ULCER WITH PERFORATION (HCC): Primary | ICD-10-CM

## 2024-08-01 DIAGNOSIS — K31.89 GASTRIC MASS: ICD-10-CM

## 2024-08-01 PROCEDURE — 99213 OFFICE O/P EST LOW 20 MIN: CPT | Performed by: PHYSICIAN ASSISTANT

## 2024-08-01 NOTE — PROGRESS NOTES
[facility] - Outpatient Progress Note  Nidhi Jeffries 58 y.o. female MRN: [id number]  Encounter: [id number]    Assessment and Plan    1. Acute gastric ulcer with perforation (HCC)  -Status post surgery with Amor patch on July 10, 2024  -Surgical gastric biopsy was negative for H. pylori  -Continue pantoprazole 40 mg twice daily    2. Gastric mass  -Patient seen in consult on July 10, 2024 and is already scheduled on September 13, 2024 with Dr. Garza for EUS with FNA    Needs follow-up after EUS      --------------------------------------------------------------------------------------------------------------------    Chief Complaint: Hospital follow-up gastric ulcer and gastric mass    HPI: Nidhi Jeffries is a 58 y.o. female  with a history of hypertension, hyperlipidemia, rheumatoid arthritis, spinal stenosis, GERD who presented to the hospital on July 10, 2024 due to a sudden onset of sharp upper abdominal pain that began around 4:30 AM. She reports that she had been having very mild intermittent discomfort in her abdomen recently but nothing significant. Denied any significant vomiting, occasional nausea. She reports taking meloxicam on a regular basis for joint pain.  CT scan revealed findings most compatible with perforated distal gastric antral/pyloric ulcer.  Mass like proximal stomach opacity. Hematoma versus submucosal mass such as leiomyoma or GIST.  She underwent emergency surgery with Amor patch with regard to her ulcer.  GI was consulted to follow-up on gastric mass.  Surgical biopsy was negative for H. pylori.    She feels well postsurgically.  She has seen by the surgeons yesterday and they recommended reducing the pantoprazole to once daily.  Her bowels are moving fine.    Patient denies any nausea, vomiting, abdominal pain, dysphagia, unexpected weight loss, diarrhea, constipation, blood in stool, or black tarry stools.     Endoscopy History:  EGD -none  previously  Colonoscopy -2020 with sigmoid polyp removed which was lymphoid material    Review of Systems:   General: negative for fatigue, fever, night sweats or unexpected weight loss  Psychological: negative for anxiety or depression  Ophthalmic: negative for blurry vision or scleral icterus  ENT: negative for headaches, sore throat or dysphagia  Hematological and Lymphatic: negative for pallor or swollen lymph nodes  Respiratory: negative for cough, shortness of breath or wheezing  Cardiovascular: negative for chest pain, edema or murmur  Gastrointestinal: as mentioned in HPI  Genito-Urinary: negative for dysuria or incontinence  Musculoskeletal: negative for joint pain, joint stiffness or joint swelling  Dermatological: negative for pruritus, rash, or jaundice    Current Medications  Current Outpatient Medications   Medication Sig Dispense Refill   • Acetaminophen (TYLENOL ARTHRITIS PAIN PO) Take by mouth     • amLODIPine-benazepril (LOTREL 5-20) 5-20 MG per capsule Take 1 capsule by mouth daily     • atorvastatin (LIPITOR) 20 mg tablet      • gabapentin (NEURONTIN) 300 mg capsule TAKE ONE CAPSULE BY MOUTH ONCE DAILY at bedtime 30 capsule 5   • Multiple Vitamins-Minerals (CENTRUM SILVER PO) Take by mouth     • pantoprazole (PROTONIX) 40 mg tablet Take 1 tablet (40 mg total) by mouth 2 (two) times a day 60 tablet 0   • spironolactone (ALDACTONE) 25 mg tablet        No current facility-administered medications for this visit.       Past Medical History  Past Medical History:   Diagnosis Date   • Hypertension    • Rheumatoid arthritis (HCC)        Past Surgical History  Past Surgical History:   Procedure Laterality Date   •  SECTION     • LAPAROTOMY N/A 7/10/2024    Procedure: LAPAROTOMY EXPLORATORY, MODIFIED KYRIE PATCH;  Surgeon: Luis Alberto Lopez DO;  [facility];  Service: General       Past Social History   Social History     Socioeconomic History   • Marital status:  "/Civil Union     Spouse name: None   • Number of children: None   • Years of education: None   • Highest education level: None   Occupational History   • None   Tobacco Use   • Smoking status: Former   • Smokeless tobacco: Never   Vaping Use   • Vaping status: Never Used   Substance and Sexual Activity   • Alcohol use: Not Currently   • Drug use: No   • Sexual activity: Not Currently     Partners: Male     Comment:    Other Topics Concern   • None   Social History Narrative   • None     Social Determinants of Health     Financial Resource Strain: Not on file   Food Insecurity: No Food Insecurity (7/12/2024)    Hunger Vital Sign    • Worried About Running Out of Food in the Last Year: Never true    • Ran Out of Food in the Last Year: Never true   Transportation Needs: No Transportation Needs (7/12/2024)    PRAPARE - Transportation    • Lack of Transportation (Medical): No    • Lack of Transportation (Non-Medical): No   Physical Activity: Not on file   Stress: Not on file   Social Connections: Not on file   Intimate Partner Violence: Not on file   Housing Stability: Unknown (7/12/2024)    Housing Stability Vital Sign    • Unable to Pay for Housing in the Last Year: No    • Number of Times Moved in the Last Year: Not on file    • Homeless in the Last Year: No       Vital Signs  Vitals:    08/01/24 1534   BP: 134/74   BP Location: Left arm   Patient Position: Sitting   Cuff Size: Standard   Pulse: 83   SpO2: 97%   Weight: 84.8 kg (187 lb)   Height: 5' 2\" (1.575 m)       Physical Exam:  General appearance: alert, cooperative, no distress  HEENT: normocephalic, anicteric, no eye erythema or discharge, no oropharyngeal thrush  Neck: supple  Lungs: CTA b/l, no rales, rhonchi, or wheezing, unlabored respirations  Heart: RRR, no murmur, rubs, or gallops  Abdomen: soft, non-tender, non-distended, normal bowel sounds, no masses or organomegaly  Rectal: deferred  Extremities: no cyanosis, clubbing, or " edema  Musculoskeletal: normal gait  Skin: color and texture normal, no jaundice, no rashes or lesions  Psychiatric: alert and oriented, normal affect and behavior                                                          Aubrey Velez PA-C

## 2024-08-05 ENCOUNTER — TELEPHONE (OUTPATIENT)
Age: 59
End: 2024-08-05

## 2024-08-05 NOTE — TELEPHONE ENCOUNTER
Patient was in the office on 7/31/24 and was told that her [facility] paperwork would be faxed that day. Her employer called her on Friday, 8/2/24 and stated they did not receive it and she was at high risk of losing her job.     I spoke to Jazlyn in the office and she stated that [employer]iny was not in on Friday, so the paperwork was not faxed. The patient is requesting a call back today when the paperwork is actually faxed as her employer is requiring they receive it today.

## 2024-08-19 ENCOUNTER — TELEPHONE (OUTPATIENT)
Age: 59
End: 2024-08-19

## 2024-08-19 NOTE — TELEPHONE ENCOUNTER
Spoke with patient.  Return to work letter completed.  She will pick it up in the office tomorrow.

## 2024-08-19 NOTE — TELEPHONE ENCOUNTER
Patient had surgery with Dr. Lopez on 7/10/2024. Her employer is requesting a return to work letter, which the patient would like to  Tuesday morning, 8/20/2024. Patient does not have Mychart and needs the physical paper.

## 2024-09-13 ENCOUNTER — DOCUMENTATION (OUTPATIENT)
Dept: HEMATOLOGY ONCOLOGY | Facility: CLINIC | Age: 59
End: 2024-09-13

## 2024-09-13 ENCOUNTER — HOSPITAL ENCOUNTER (OUTPATIENT)
Dept: GASTROENTEROLOGY | Facility: HOSPITAL | Age: 59
Setting detail: OUTPATIENT SURGERY
Discharge: HOME | End: 2024-09-13
Attending: INTERNAL MEDICINE
Payer: COMMERCIAL

## 2024-09-13 ENCOUNTER — ANESTHESIA (OUTPATIENT)
Dept: GASTROENTEROLOGY | Facility: HOSPITAL | Age: 59
End: 2024-09-13
Payer: COMMERCIAL

## 2024-09-13 ENCOUNTER — ANESTHESIA EVENT (OUTPATIENT)
Dept: GASTROENTEROLOGY | Facility: HOSPITAL | Age: 59
End: 2024-09-13
Payer: COMMERCIAL

## 2024-09-13 VITALS
TEMPERATURE: 97.5 F | RESPIRATION RATE: 16 BRPM | SYSTOLIC BLOOD PRESSURE: 126 MMHG | HEART RATE: 71 BPM | OXYGEN SATURATION: 99 % | DIASTOLIC BLOOD PRESSURE: 73 MMHG

## 2024-09-13 DIAGNOSIS — K31.89 GASTRIC MASS: Primary | ICD-10-CM

## 2024-09-13 DIAGNOSIS — K31.89 GASTRIC MASS: ICD-10-CM

## 2024-09-13 PROCEDURE — 88341 IMHCHEM/IMCYTCHM EA ADD ANTB: CPT | Performed by: PATHOLOGY

## 2024-09-13 PROCEDURE — 88342 IMHCHEM/IMCYTCHM 1ST ANTB: CPT | Performed by: PATHOLOGY

## 2024-09-13 PROCEDURE — C1889 IMPLANT/INSERT DEVICE, NOC: HCPCS

## 2024-09-13 PROCEDURE — 88305 TISSUE EXAM BY PATHOLOGIST: CPT | Performed by: PATHOLOGY

## 2024-09-13 RX ORDER — PROPOFOL 10 MG/ML
INJECTION, EMULSION INTRAVENOUS AS NEEDED
Status: DISCONTINUED | OUTPATIENT
Start: 2024-09-13 | End: 2024-09-13

## 2024-09-13 RX ORDER — PROPOFOL 10 MG/ML
INJECTION, EMULSION INTRAVENOUS CONTINUOUS PRN
Status: DISCONTINUED | OUTPATIENT
Start: 2024-09-13 | End: 2024-09-13

## 2024-09-13 RX ORDER — SODIUM CHLORIDE 9 MG/ML
INJECTION, SOLUTION INTRAVENOUS CONTINUOUS PRN
Status: DISCONTINUED | OUTPATIENT
Start: 2024-09-13 | End: 2024-09-13

## 2024-09-13 RX ORDER — LIDOCAINE HYDROCHLORIDE 10 MG/ML
INJECTION, SOLUTION EPIDURAL; INFILTRATION; INTRACAUDAL; PERINEURAL AS NEEDED
Status: DISCONTINUED | OUTPATIENT
Start: 2024-09-13 | End: 2024-09-13

## 2024-09-13 RX ADMIN — SODIUM CHLORIDE: 0.9 INJECTION, SOLUTION INTRAVENOUS at 12:28

## 2024-09-13 RX ADMIN — LIDOCAINE HYDROCHLORIDE 100 MG: 10 INJECTION, SOLUTION EPIDURAL; INFILTRATION; INTRACAUDAL; PERINEURAL at 12:31

## 2024-09-13 RX ADMIN — PROPOFOL 110 MG: 10 INJECTION, EMULSION INTRAVENOUS at 12:31

## 2024-09-13 RX ADMIN — PROPOFOL 120 MCG/KG/MIN: 10 INJECTION, EMULSION INTRAVENOUS at 12:31

## 2024-09-13 NOTE — ANESTHESIA POSTPROCEDURE EVALUATION
Post-Op Assessment Note    CV Status:  Stable  Pain Score: 0    Pain management: adequate       Mental Status:  Alert and awake   Hydration Status:  Euvolemic   PONV Controlled:  Controlled   Airway Patency:  Patent     Post Op Vitals Reviewed: Yes    No anethesia notable event occurred.    Staff: CRNA               /78 (09/13/24 1303)    Temp 97.5 °F (36.4 °C) (09/13/24 1303)    Pulse 88 (09/13/24 1303)   Resp 16 (09/13/24 1303)    SpO2 99 % (09/13/24 1303)

## 2024-09-13 NOTE — PROGRESS NOTES
Chart rvw'd on 9/19/24    Referring provider-  Dr. Garza  Referral to surgical oncology    EUS date-  9/13/24      Impression-  The esophagus appeared normal.  Submucosal mass just distal to the GE Junction seen on retroflexion. Z line at 37 cm from the incisors. Mass was located at 40 cm from the incisors.  The duodenal bulb and 2nd part of the duodenum appeared normal.  Heterogeneous, hypoechoic, lobulated and round mass measuring 4.1 mm x 3.4 mm with well-defined and smooth margins was visualized in the stomach, contained within the submucosa; 3 fine needle biopsy passes were taken. An adequate sample was obtained. A sample was sent for histology analysis. Onsite cytologist was present      Pathology-  pending results      Labs-  CBC and BMP are up to date      Imaging-  7/10/24 - CT ABDOMEN AND PELVIS WITH IV CONTRAST     IMPRESSION:     Findings most compatible with perforated distal gastric antral/pyloric ulcer.     Masslike proximal stomach opacity. Hematoma versus submucosal mass such as leiomyoma or GIST.      Comments -   CT Chest will be ordered.

## 2024-09-13 NOTE — H&P
History and Physical -  [facility]  Nidhi Jeffries 58 y.o. female MRN: [id number]                  HPI: Nidhi Jeffries is a 58 y.o. year old female who presents for EUS for gastric mass      REVIEW OF SYSTEMS: Per the HPI, and otherwise unremarkable.    Historical Information   Past Medical History:   Diagnosis Date    Hypertension     Rheumatoid arthritis (HCC)      Past Surgical History:   Procedure Laterality Date     SECTION      LAPAROTOMY N/A 7/10/2024    Procedure: LAPAROTOMY EXPLORATORY, MODIFIED KYRIE PATCH;  Surgeon: Luis Alberto Lopez DO;  Location[facility];  Service: General     Social History   Social History     Substance and Sexual Activity   Alcohol Use Not Currently     Social History     Substance and Sexual Activity   Drug Use No     Social History     Tobacco Use   Smoking Status Former   Smokeless Tobacco Never     Family History   Problem Relation Age of Onset    Heart disease Mother     Hypertension Mother     Diabetes Mother     No Known Problems Father     No Known Problems Maternal Grandmother     No Known Problems Maternal Grandfather     No Known Problems Paternal Grandmother     Prostate cancer Paternal Grandfather 70    No Known Problems Brother     No Known Problems Brother     No Known Problems Brother     No Known Problems Son     Breast cancer Maternal Aunt 70    Ovarian cancer Paternal Aunt     Endometrial cancer Paternal Aunt 36    No Known Problems Family     Colon cancer Neg Hx        Meds/Allergies       Current Outpatient Medications:     Acetaminophen (TYLENOL ARTHRITIS PAIN PO)    amLODIPine-benazepril (LOTREL 5-20) 5-20 MG per capsule    atorvastatin (LIPITOR) 20 mg tablet    gabapentin (NEURONTIN) 300 mg capsule    Multiple Vitamins-Minerals (CENTRUM SILVER PO)    pantoprazole (PROTONIX) 40 mg tablet    spironolactone (ALDACTONE) 25 mg tablet    Allergies   Allergen Reactions    Sulfa Antibiotics        Objective     /72   Pulse 71    Temp (!) 97.3 °F (36.3 °C) (Tympanic)   Resp 16   LMP 09/26/2020 (Exact Date)   SpO2 97%       PHYSICAL EXAM    Gen: NAD  Head: NCAT  CV: RRR  CHEST: Clear  ABD: soft, NT/ND  EXT: no edema      ASSESSMENT/PLAN:  This is a 58 y.o. year old female here for gastric mass, and she is stable and optimized for her procedure.

## 2024-09-13 NOTE — ANESTHESIA PREPROCEDURE EVALUATION
Procedure:  ENDOSCOPIC ULTRASOUND (UPPER)    Relevant Problems   CARDIO   (+) Essential hypertension   (+) Hyperlipidemia      GI/HEPATIC   (+) Acute gastric ulcer with perforation (HCC)   (+) Perforated ulcer (HCC)      MUSCULOSKELETAL   (+) Generalized osteoarthritis      NEURO/PSYCH   (+) Paresthesia of hand, bilateral        Physical Exam    Airway    Mallampati score: II  TM Distance: >3 FB  Neck ROM: full     Dental   No notable dental hx     Cardiovascular  Cardiovascular exam normal    Pulmonary  Pulmonary exam normal     Other Findings  post-pubertal.      Anesthesia Plan  ASA Score- 3     Anesthesia Type- IV sedation with anesthesia with ASA Monitors.         Additional Monitors:     Airway Plan:            Plan Factors-Exercise tolerance (METS): >4 METS.    Chart reviewed. EKG reviewed.  Existing labs reviewed. Patient summary reviewed.    Patient is not a current smoker.              Induction-     Postoperative Plan-     Perioperative Resuscitation Plan - Level 1 - Full Code.       Informed Consent- Anesthetic plan and risks discussed with patient.  I personally reviewed this patient with the CRNA. Discussed and agreed on the Anesthesia Plan with the CRNA..

## 2024-09-16 ENCOUNTER — TELEPHONE (OUTPATIENT)
Age: 59
End: 2024-09-16

## 2024-09-16 NOTE — TELEPHONE ENCOUNTER
Patient called back regarding scheduling appointment with Surgical Oncology. CT chest needed prior to consult, CT scheduled for Friday, 9/20/24 at 1730. Consult scheduled with Dr. Valencia for 9/25/24 at 0830 in the [place] office.

## 2024-09-17 ENCOUNTER — NURSE TRIAGE (OUTPATIENT)
Age: 59
End: 2024-09-17

## 2024-09-17 NOTE — TELEPHONE ENCOUNTER
Last OV 8/1/24 Aubrey Velez PA-C Acute gastric ulcer with perforation   EUS 9/13/24    Pt reports worsening intermittent pain beneath ribs and burping since procedure. States eating causes nausea. Able to eat small sandwich this am. No issues with fluids.     Taking Protonix 40mg BID. Denies vomiting, fever, abdominal pain, changes to BMs, blood in stool, dark or tarry stools.    Advised clear liquids/BRAT and advance as tolerated. Reviewed alarm symptoms that necessitate ED evaluation. Pt verbalized understanding.    Pt uses [facility]. Please advise.  Answer Questions - Advanced Endoscopy Initial Assessment  Which procedure specific to Advanced Endoscopy: EUS    Do you have fever + jaundice: No or low grade fever (<100°F)    Do you have painful jaundice: no    Do you have had a fever post procedure: no    Do you have [severe abdominal pain], and/or [intractable N/V > 24 hours] following advanced procedure: no    Did you have a pre-op EGD with dilation, and cannot tolerate liquids/solids: no    Do you have new onset jaundice: no    Do you have uninterpreted specialty labs ([facility]): no    Do you have pre-procedure questions: no    Do you have post-procedure questions/concerns not meeting ED criteria (Ex: s/p EGD with stent): yes    Do you have questions regarding results: no    Do you have s/p EUS with stent for cyst drainage, EUS with GJ stent, or EUS with stent for gallbladder drainage or EDGE (EUS Directed trans-Gastric ERCP): no    Do you have scheduling questions regarding advanced procedures (ex: stent removal): no    Protocols used: [facility]

## 2024-09-20 ENCOUNTER — HOSPITAL ENCOUNTER (OUTPATIENT)
Dept: RADIOLOGY | Facility: HOSPITAL | Age: 59
Discharge: HOME | End: 2024-09-20
Attending: INTERNAL MEDICINE
Payer: COMMERCIAL

## 2024-09-20 DIAGNOSIS — K31.89 GASTRIC MASS: ICD-10-CM

## 2024-09-20 PROCEDURE — 71250 CT THORAX DX C-: CPT

## 2024-09-24 PROBLEM — D21.4 LEIOMYOMA OF STOMACH: Status: ACTIVE | Noted: 2024-09-24

## 2024-09-25 ENCOUNTER — CONSULT (OUTPATIENT)
Dept: SURGICAL ONCOLOGY | Facility: CLINIC | Age: 59
End: 2024-09-25
Payer: COMMERCIAL

## 2024-09-25 VITALS
HEART RATE: 76 BPM | OXYGEN SATURATION: 98 % | SYSTOLIC BLOOD PRESSURE: 112 MMHG | HEIGHT: 62 IN | RESPIRATION RATE: 16 BRPM | DIASTOLIC BLOOD PRESSURE: 80 MMHG | BODY MASS INDEX: 33.86 KG/M2 | WEIGHT: 184 LBS | TEMPERATURE: 97.5 F

## 2024-09-25 DIAGNOSIS — D21.4 LEIOMYOMA OF STOMACH: ICD-10-CM

## 2024-09-25 DIAGNOSIS — K31.89 GASTRIC MASS: Primary | ICD-10-CM

## 2024-09-25 PROCEDURE — 99204 OFFICE O/P NEW MOD 45 MIN: CPT | Performed by: SURGERY

## 2024-09-25 RX ORDER — AMLODIPINE BESYLATE 5 MG/1
TABLET ORAL
COMMUNITY
Start: 2024-07-05

## 2024-09-25 RX ORDER — BENAZEPRIL HYDROCHLORIDE 20 MG/1
TABLET ORAL
COMMUNITY
Start: 2024-09-13

## 2024-09-25 NOTE — ASSESSMENT & PLAN NOTE
58-year-old female with a gastric mass.  Biopsy reveals this is likely a leiomyoma.  She is asymptomatic.  Based on the size of this lesion, I would recommend that she undergo resection.  It may become symptomatic in the future.  The risks of partial gastrectomy were explained and included bleeding, infection, recurrence, need for further surgery, wound complications, adjacent organ injury, leak at the staple line, stricture, sepsis, MI, DVT, stroke, pulmonary embolism, and death.  Informed consent was obtained.  We will schedule this at our earliest mutual convenience.  She is agreeable to this plan.  All her questions were answered.

## 2024-09-25 NOTE — PROGRESS NOTES
Surgical Oncology Consult       [address] [place] [address] SURGICAL ONCOLOGY [address] [address]    Nidhi Jeffries  1965  [id number]  [address] [place] [address] SURGICAL ONCOLOGY [address] [address]    1. Gastric mass  -     Ambulatory Referral to Surgical Oncology  -     Case request operating room: PARTIAL GASTRECTOMY; Standing  -     Type and screen; Future  -     Comprehensive metabolic panel; Future  -     CBC and differential; Future  -     APTT; Future  -     Protime-INR; Future  -     HEMOGLOBIN A1C W/ EAG ESTIMATION; Future  -     Case request operating room: PARTIAL GASTRECTOMY  2. Leiomyoma of stomach  Assessment & Plan:  58-year-old female with a gastric mass.  Biopsy reveals this is likely a leiomyoma.  She is asymptomatic.  Based on the size of this lesion, I would recommend that she undergo resection.  It may become symptomatic in the future.  The risks of partial gastrectomy were explained and included bleeding, infection, recurrence, need for further surgery, wound complications, adjacent organ injury, leak at the staple line, stricture, sepsis, MI, DVT, stroke, pulmonary embolism, and death.  Informed consent was obtained.  We will schedule this at our earliest mutual convenience.  She is agreeable to this plan.  All her questions were answered.  Orders:  -     Case request operating room: PARTIAL GASTRECTOMY; Standing  -     Case request operating room: PARTIAL GASTRECTOMY      Chief Complaint   Patient presents with    Consult       No follow-ups on file.    Oncology History    No history exists.       History of Present Illness: 58-year-old female who presented with abdominal pain in July 2024.  CT from July 10, 2024 revealed a 3 x 4.5 x 3.2 cm along the lesser curve near the GE junction.  There were also findings of a perforated gastric ulcer.  I personally reviewed the films.   This ulcer was repaired.  When she recovered she had workup of the gastric mass.  She then underwent endoscopic ultrasound on September 13, 2024.  This revealed a submucosal mass just distal to the GE junction seen on retroflexion.  This was 40 cm from the incisors.  This was within the submucosa.  Pathology revealed fragments of smooth muscle that was consistent with leiomyoma.   was negative.  He comes in now to discuss further therapy.  She denies any abdominal pain, nausea or vomiting.  No early satiety.  No change in appetite or unintentional weight loss.    Review of Systems  Complete ROS Surg Onc:   Constitutional: The patient denies new or recent history of general fatigue, no recent weight loss, no change in appetite.   Eyes: No complaints of visual problems, no scleral icterus.   ENT: no complaints of ear pain, no hoarseness, no difficulty swallowing,  no tinnitus and no new masses in head, oral cavity, or neck.   Cardiovascular: No complaints of chest pain, no palpitations, no ankle edema.   Respiratory: No complaints of shortness of breath, no cough.   Gastrointestinal: No complaints of jaundice, no bloody stools, no pale stools.   Genitourinary: No complaints of dysuria, no hematuria, no nocturia, no frequent urination, no urethral discharge.   Musculoskeletal: No complaints of weakness, paralysis, joint stiffness or arthralgias.  Integumentary: No complaints of rash, no new lesions.   Neurological: No complaints of convulsions, no seizures, no dizziness.   Hematologic/Lymphatic: No complaints of easy bruising.   Endocrine:  No hot or cold intolerance.  No polydipsia, polyphagia, or polyuria.  Allergy/immunology:  No environmental allergies.  No food allergies.  Not immunocompromised.  Skin:  No pallor or rash.  No wound.          Patient Active Problem List   Diagnosis    Lumbar stenosis with neurogenic claudication    Essential hypertension    Generalized osteoarthritis    Hyperlipidemia    Lump,  breast    Postmenopausal bleeding    Low libido    Intervertebral disc disorder with radiculopathy of lumbar region    Bilateral carpal tunnel syndrome    Paresthesia of hand, bilateral    Bilateral hand pain    S/P exploratory laparotomy    Perforated ulcer (HCC)    Gastric mass    Acute gastric ulcer with perforation (HCC)    Leiomyoma of stomach     Past Medical History:   Diagnosis Date    Hypertension     Rheumatoid arthritis (HCC)      Past Surgical History:   Procedure Laterality Date     SECTION      LAPAROTOMY N/A 7/10/2024    Procedure: LAPAROTOMY EXPLORATORY, MODIFIED KYRIE PATCH;  Surgeon: Luis Alberto Lopez DO;  Location[facility];  Service: General     Family History   Problem Relation Age of Onset    Heart disease Mother     Hypertension Mother     Diabetes Mother     No Known Problems Father     No Known Problems Maternal Grandmother     No Known Problems Maternal Grandfather     No Known Problems Paternal Grandmother     Prostate cancer Paternal Grandfather 70    No Known Problems Brother     No Known Problems Brother     No Known Problems Brother     No Known Problems Son     Breast cancer Maternal Aunt 70    Ovarian cancer Paternal Aunt     Endometrial cancer Paternal Aunt 36    No Known Problems Family     Colon cancer Neg Hx      Social History     Socioeconomic History    Marital status: /Civil Union     Spouse name: Not on file    Number of children: Not on file    Years of education: Not on file    Highest education level: Not on file   Occupational History    Not on file   Tobacco Use    Smoking status: Former    Smokeless tobacco: Never   Vaping Use    Vaping status: Never Used   Substance and Sexual Activity    Alcohol use: Not Currently    Drug use: No    Sexual activity: Not Currently     Partners: Male     Comment:    Other Topics Concern    Not on file   Social History Narrative    Not on file     Social Determinants of Health     Financial Resource Strain:  Not on file   Food Insecurity: No Food Insecurity (7/12/2024)    Hunger Vital Sign     Worried About Running Out of Food in the Last Year: Never true     Ran Out of Food in the Last Year: Never true   Transportation Needs: No Transportation Needs (7/12/2024)    PRAPARE - Transportation     Lack of Transportation (Medical): No     Lack of Transportation (Non-Medical): No   Physical Activity: Not on file   Stress: Not on file   Social Connections: Not on file   Intimate Partner Violence: Not on file   Housing Stability: Unknown (7/12/2024)    Housing Stability Vital Sign     Unable to Pay for Housing in the Last Year: No     Number of Times Moved in the Last Year: Not on file     Homeless in the Last Year: No       Current Outpatient Medications:     Acetaminophen (TYLENOL ARTHRITIS PAIN PO), Take by mouth, Disp: , Rfl:     amLODIPine-benazepril (LOTREL 5-20) 5-20 MG per capsule, Take 1 capsule by mouth daily, Disp: , Rfl:     atorvastatin (LIPITOR) 20 mg tablet, , Disp: , Rfl:     benazepril (LOTENSIN) 20 mg tablet, , Disp: , Rfl:     gabapentin (NEURONTIN) 300 mg capsule, TAKE ONE CAPSULE BY MOUTH ONCE DAILY at bedtime, Disp: 30 capsule, Rfl: 5    Multiple Vitamins-Minerals (CENTRUM SILVER PO), Take by mouth, Disp: , Rfl:     spironolactone (ALDACTONE) 25 mg tablet, , Disp: , Rfl:     amLODIPine (NORVASC) 5 mg tablet, , Disp: , Rfl:     pantoprazole (PROTONIX) 40 mg tablet, Take 1 tablet (40 mg total) by mouth 2 (two) times a day, Disp: 60 tablet, Rfl: 0  Allergies   Allergen Reactions    Sulfa Antibiotics      Vitals:    09/25/24 0815   BP: 112/80   Pulse: 76   Resp: 16   Temp: 97.5 °F (36.4 °C)   SpO2: 98%       Physical Exam   Constitutional: General appearance: The Patient is well-developed and well-nourished who appears the stated age in no acute distress. Patient is pleasant and talkative.     HEENT:  Normocephalic.  Sclerae are anicteric. Mucous membranes are moist. Neck is supple without adenopathy. No JVD.      Chest: The lungs are clear to auscultation.     Cardiac: Heart is regular rate.     Abdomen: Abdomen is soft, non-tender, non-distended and without masses.     Extremities: There is no clubbing or cyanosis. There is no edema.  Symmetric.  Neuro: Grossly nonfocal. Gait is normal.     Lymphatic: No evidence of cervical adenopathy bilaterally.   No evidence of axillary adenopathy bilaterally.   Skin: Warm, anicteric.    Psych:  Patient is pleasant and talkative.  Breasts:      Pathology:  Final Diagnosis   A. Stomach, cell block, gastric mass:  - Fragments of smooth muscle, consistent leiomyoma in appropriate clinical settings.     Comment: Sections show bland smooth muscle which confirmed by positive SMA and desmin (both stain are strong and diffuse). There is no necrosis or mitotic figure identified. These cells are negative for CD34 and S100; , DOG1 highlight  scattered Cajal cells and mast cells. The differential diagnosis will include leiomyoma and muscular propria of stomach. Clinical and imaging correlation is suggested.    Electronically signed by Aidee Hodges MD on 9/19/2024 at  8:39 AM       Labs:      Imaging  CT chest wo contrast    Result Date: 9/24/2024  Narrative: CT CHEST WITHOUT IV CONTRAST INDICATION: K31.89: Other diseases of stomach and duodenum. Evaluate for metastatic disease. COMPARISON: CT abdomen pelvis 7/10/2024 TECHNIQUE: CT examination of the chest was performed without intravenous contrast. Multiplanar 2D reformatted images were created from the source data. This examination, like all CT scans performed in the [facility], was performed utilizing techniques to minimize radiation dose exposure, including the use of iterative reconstruction and automated exposure control. Radiation dose length product (DLP) for this visit: 338.68 mGy-cm FINDINGS: LUNGS: Lungs are clear. There is no tracheal or endobronchial lesion. PLEURA: Unremarkable. HEART/GREAT VESSELS: Coronary  artery calcifications. No thoracic aortic aneurysm. MEDIASTINUM AND KYLIE: Unremarkable. CHEST WALL AND LOWER NECK: Unremarkable. VISUALIZED STRUCTURES IN THE UPPER ABDOMEN: 4.8 cm x 3.5 cm mass in the gastric cardia (series 301 image 101). OSSEOUS STRUCTURES: No acute fracture or destructive osseous lesion.     Impression: 1. Redemonstration of 4.8 cm x 3.5 cm gastric cardia mass consistent with biopsy-proven leiomyoma. 2. No metastatic disease in the chest. Workstation performed: [id number]     Endoscopic ultrasonography, GI (Upper)    Result Date: 9/13/2024  Narrative: Table formatting from the original result was not included. [facility] [address] [facility] [address] [phone] DATE OF SERVICE: 9/13/24 PHYSICIAN(S): Attending: Jen Garza MD Fellow: Anup Fernando MD INDICATION: Gastric mass POST-OP DIAGNOSIS: See the impression below. PREPROCEDURE: Informed consent was obtained for the procedure, including sedation.  Risks of perforation, hemorrhage, adverse drug reaction and aspiration were discussed. The patient was placed in the left lateral decubitus position. Patient was explained about the risks and benefits of the procedure. Risks including but not limited to bleeding, infection, and perforation were explained in detail. Also explained about less than 100% sensitivity with the exam and other alternatives. PROCEDURE: EUS UPPER DETAILS OF PROCEDURE: Patient was taken to the procedure room where a time out was performed to confirm correct patient and correct procedure. The patient underwent monitored anesthesia care, which was administered by an anesthesia professional. The patient's blood pressure, heart rate, oxygen, respirations, level of consciousness, ECG and ETCO2 were monitored throughout the procedure. The endoscope, linear scope and radial scope were introduced through the mouth. The patient experienced no blood loss. The procedure was not difficult. The  patient tolerated the procedure well. There were no apparent adverse events. ANESTHESIA INFORMATION: ASA: III Anesthesia Type: IV Sedation with Anesthesia MEDICATIONS: No administrations occurring from 1227 to 1303 on 09/13/24 FINDINGS: The esophagus appeared normal. Submucosal mass just distal to the GE Junction seen on retroflexion. Z line at 37 cm from the incisors. Mass was located at 40 cm from the incisors. The duodenal bulb and 2nd part of the duodenum appeared normal. Heterogeneous, hypoechoic, lobulated and round mass measuring 4.1 mm x 3.4 mm with well-defined and smooth margins was visualized in the stomach, contained within the submucosa; 3 successful fine needle biopsy passes were taken with a 22 gauge needle using a transgastric approach guided by Doppler. An adequate sample was obtained. A sample was sent for histology analysis. Onsite cytologist was present SPECIMENS: ID Type Source Tests Collected by Time Destination 1 : gastric mass FNA Stomach NON-GYNECOLOGIC CYTOLOGY, FINE NEEDLE ASPIRATION/BIOPSY Jen Garza MD 9/13/2024 12:51 PM       Impression: The esophagus appeared normal. Submucosal mass just distal to the GE Junction seen on retroflexion. Z line at 37 cm from the incisors. Mass was located at 40 cm from the incisors. The duodenal bulb and 2nd part of the duodenum appeared normal. Heterogeneous, hypoechoic, lobulated and round mass measuring 4.1 mm x 3.4 mm with well-defined and smooth margins was visualized in the stomach, contained within the submucosa; 3 fine needle biopsy passes were taken. An adequate sample was obtained. A sample was sent for histology analysis. Onsite cytologist was present RECOMMENDATION: Follow up pathology results Referral to surgical oncology Jen Garza MD     I personally reviewed and interpreted the above laboratory and imaging data.

## 2024-09-26 ENCOUNTER — ANNUAL EXAM (OUTPATIENT)
Dept: OBGYN CLINIC | Facility: MEDICAL CENTER | Age: 59
End: 2024-09-26
Payer: COMMERCIAL

## 2024-09-26 VITALS
WEIGHT: 182 LBS | BODY MASS INDEX: 34.36 KG/M2 | DIASTOLIC BLOOD PRESSURE: 80 MMHG | SYSTOLIC BLOOD PRESSURE: 114 MMHG | HEIGHT: 61 IN

## 2024-09-26 DIAGNOSIS — N95.8 GENITOURINARY SYNDROME OF MENOPAUSE: ICD-10-CM

## 2024-09-26 DIAGNOSIS — Z12.4 ENCOUNTER FOR PAPANICOLAOU SMEAR FOR CERVICAL CANCER SCREENING: ICD-10-CM

## 2024-09-26 DIAGNOSIS — Z12.31 ENCOUNTER FOR SCREENING MAMMOGRAM FOR BREAST CANCER: ICD-10-CM

## 2024-09-26 DIAGNOSIS — Z01.411 ENCNTR FOR GYN EXAM (GENERAL) (ROUTINE) W ABNORMAL FINDINGS: Primary | ICD-10-CM

## 2024-09-26 PROCEDURE — G0145 SCR C/V CYTO,THINLAYER,RESCR: HCPCS | Performed by: NURSE PRACTITIONER

## 2024-09-26 PROCEDURE — G0476 HPV COMBO ASSAY CA SCREEN: HCPCS | Performed by: NURSE PRACTITIONER

## 2024-09-26 PROCEDURE — 99396 PREV VISIT EST AGE 40-64: CPT | Performed by: NURSE PRACTITIONER

## 2024-09-26 RX ORDER — ESTRADIOL 0.1 MG/G
CREAM VAGINAL
Qty: 42.5 G | Refills: 1 | Status: SHIPPED | OUTPATIENT
Start: 2024-09-26

## 2024-09-26 NOTE — PROGRESS NOTES
Assessment/Plan:  Calcium 1200-1500mg (in divided doses-max 600 mg at one time) + 600-1000 IU Vit D daily.   Exercise 150-300 minutes per week minimum including weight bearing exercises.   Pap with high risk HPV Q 5 years, if normal.  Collected today. If specimen is not adequate, will consider repap.   Call your insurance company to verify coverage prior to completing any ordered tests.   Annual mammogram ordered and monthly breast self exam recommended.    Colonoscopy- Due   Use hydrocortisone ointment twice daily for 7 days to affected area on abdominal skin.          Kegels 20 times twice daily.   Vaginal moisturizers twice weekly as needed.   Return to office in one year or sooner, if needed.        1. Encntr for gyn exam (general) (routine) w abnormal findings  2. Encounter for Papanicolaou smear for cervical cancer screening  -     Liquid-based pap, screening  3. Genitourinary syndrome of menopause  -     estradiol (ESTRACE) 0.1 mg/g vaginal cream; Use 0.5 gm daily x 2 weeks then 0.5 gm twice weekly  4. Encounter for screening mammogram for breast cancer  -     Mammo screening bilateral w 3d and cad; Future  5. BMI 34.0-34.9,adult             Subjective:      Patient ID: Nidhi Jeffries is a 59 y.o. female.    HPI    Nidhi Jeffries is a 59 y.o.  ( 26 yo boy ) female who is here today for her annual visit. No gynecologic health concerns. Hx of HTN but stable.   Planned partial gastrectomy for gastric mass removal. Biopsy showed mass was benign.     Post menopausal x 3 years with no vaginal bleeding since. Does have hot flashes, worse at night. Tolerable.   Exercise- not regularly.   Works FT  at [employer].     Nidhi Jeffries is not sexually active with male partner/  of 31 years.This is acceptable to her but not necessarily her . Feels safe in this relationship. Denies vaginal pain,bleeding or dryness.    She uses menopause and abstinence  for contraception.    She is  not interested in STD screening today.   She denies vaginal discharge, itching or pelvic pain.   She has no urinary concerns, does not have incontinence.  No bowel concerns.  No breast concerns.     Last pap: 10/23/2019 normal with negative HR HPV   Mammogram: 06/22/2023 normal   Colonoscopy: 10/07/2020 5 year recall   DEXA scan: Not on file    Family history of cancer:   Cancer-related family history includes Breast cancer (age of onset: 70) in her maternal aunt; Endometrial cancer (age of onset: 36) in her paternal aunt; Ovarian cancer in her paternal aunt; Prostate cancer (age of onset: 70) in her paternal grandfather. There is no history of Colon cancer.      The following portions of the patient's history were reviewed and updated as appropriate: allergies, current medications, past family history, past medical history, past social history, past surgical history, and problem list.    Review of Systems   Constitutional: Negative.  Negative for activity change, appetite change, chills, diaphoresis, fatigue, fever and unexpected weight change.   HENT:  Negative for congestion, dental problem, sneezing, sore throat and trouble swallowing.    Eyes:  Negative for visual disturbance.   Respiratory:  Negative for chest tightness and shortness of breath.    Cardiovascular:  Negative for chest pain and leg swelling.   Gastrointestinal:  Negative for abdominal pain, constipation, diarrhea, nausea and vomiting.   Genitourinary:  Negative for difficulty urinating, dysuria, frequency, hematuria, pelvic pain, urgency, vaginal bleeding, vaginal discharge and vaginal pain.   Musculoskeletal:  Negative for back pain and neck pain.   Skin: Negative.    Allergic/Immunologic: Negative.    Neurological:  Negative for weakness and headaches.   Hematological:  Negative for adenopathy.   Psychiatric/Behavioral: Negative.           Objective:      /80 (BP Location: Left arm, Patient Position: Sitting, Cuff Size: Standard)   Ht 5'  "1\" (1.549 m)   Wt 82.6 kg (182 lb)   LMP 09/26/2020 (Exact Date)   BMI 34.39 kg/m²          Physical Exam  Vitals and nursing note reviewed.   Constitutional:       Appearance: Normal appearance. She is well-developed.      Comments: BMI 34   HENT:      Head: Normocephalic.   Neck:      Thyroid: No thyromegaly.   Cardiovascular:      Rate and Rhythm: Normal rate and regular rhythm.      Heart sounds: Normal heart sounds.   Pulmonary:      Effort: Pulmonary effort is normal.      Breath sounds: Normal breath sounds.   Chest:   Breasts:     Breasts are symmetrical.      Right: Normal. No inverted nipple, mass, nipple discharge, skin change or tenderness.      Left: Normal. No inverted nipple, mass, nipple discharge, skin change or tenderness.   Abdominal:      Palpations: Abdomen is soft.          Comments: 2 round dry patches with one on each side of lower abdominal incision.    Genitourinary:     Exam position: Lithotomy position.      Labia:         Right: No rash, tenderness, lesion or injury.         Left: No rash, tenderness, lesion or injury.       Urethra: No prolapse, urethral pain, urethral swelling or urethral lesion.      Vagina: No signs of injury and foreign body. Tenderness present. No vaginal discharge, erythema, bleeding, lesions or prolapsed vaginal walls.      Cervix: No discharge.      Uterus: Normal.       Adnexa: Right adnexa normal and left adnexa normal.        Right: No mass, tenderness or fullness.          Left: No mass, tenderness or fullness.        Rectum: No external hemorrhoid.      Comments: Vulvovaginal atrophy  I was unable to open the speculum to see the full face of her cervix due to discomfort.   Blind pap collected.     Musculoskeletal:         General: Normal range of motion.      Cervical back: Normal range of motion.   Lymphadenopathy:      Head:      Right side of head: No submental, submandibular, tonsillar or occipital adenopathy.      Left side of head: No submental, " submandibular, tonsillar or occipital adenopathy.      Upper Body:      Right upper body: No supraclavicular or axillary adenopathy.      Left upper body: No supraclavicular or axillary adenopathy.      Lower Body: No right inguinal adenopathy. No left inguinal adenopathy.   Skin:     General: Skin is warm and dry.   Neurological:      Mental Status: She is alert and oriented to person, place, and time.   Psychiatric:         Mood and Affect: Mood normal.         Behavior: Behavior normal. Behavior is cooperative.

## 2024-09-26 NOTE — PATIENT INSTRUCTIONS
HAPPY BIRTHDAY!!!  Goodluck with gastric surgery.       Calcium 1200-1500mg (in divided doses-max 600 mg at one time) + 600-1000 IU Vit D daily.   Exercise 150-300 minutes per week minimum including weight bearing exercises.   Pap with high risk HPV Q 5 years, if normal.  Collected today  Call your insurance company to verify coverage prior to completing any ordered tests.   Annual mammogram ordered and monthly breast self exam recommended.    Colonoscopy- Due 2025  Use hydrocortisone ointment twice daily for 7 days to affected area on abdominal skin.             Kegels 20 times twice daily.   Vaginal moisturizers twice weekly as needed.   Return to office in one year or sooner, if needed.

## 2024-09-27 ENCOUNTER — TELEPHONE (OUTPATIENT)
Dept: SURGICAL ONCOLOGY | Facility: CLINIC | Age: 59
End: 2024-09-27

## 2024-09-27 NOTE — TELEPHONE ENCOUNTER
Spoke to Nidhi to reschedule her surgery with Dr Valencia. Nidhi has accepted surgery date of 10/30/24, [facility].   She has asked for a note to provide to her employer. Marry Maier RN will provide that to her. She is aware to call our office should any other forms from her employer need to be completed. She was appreciative of our call.

## 2024-09-30 ENCOUNTER — TELEPHONE (OUTPATIENT)
Dept: OBGYN CLINIC | Facility: CLINIC | Age: 59
End: 2024-09-30

## 2024-09-30 NOTE — TELEPHONE ENCOUNTER
----- Message from ALPHONSO Hart sent at 9/29/2024  9:38 PM EDT -----  Negative high risk  HPV. Still waiting on pap results.

## 2024-09-30 NOTE — TELEPHONE ENCOUNTER
Left vm for patient (per communication consent) in regards to her HPV results being negative. Advised patient that we are still waiting on the pap smear results to be resulted but our office will call back once we get the results. Patient advised to call back for any further questions or concerns.

## 2024-10-02 LAB
LAB AP GYN PRIMARY INTERPRETATION: NORMAL
Lab: NORMAL

## 2024-10-07 ENCOUNTER — TELEPHONE (OUTPATIENT)
Age: 59
End: 2024-10-07

## 2024-10-07 NOTE — TELEPHONE ENCOUNTER
Patient is requesting that the letter that was typed up for work excuse be EMAILED to email on file. Confirmed with patient. She can not access her MyChart.

## 2024-10-09 ENCOUNTER — TELEPHONE (OUTPATIENT)
Dept: OBGYN CLINIC | Facility: MEDICAL CENTER | Age: 59
End: 2024-10-09

## 2024-10-16 ENCOUNTER — APPOINTMENT (OUTPATIENT)
Dept: LAB | Facility: MEDICAL CENTER | Age: 59
End: 2024-10-16
Payer: COMMERCIAL

## 2024-10-16 ENCOUNTER — LAB REQUISITION (OUTPATIENT)
Dept: LAB | Facility: HOSPITAL | Age: 59
End: 2024-10-16
Payer: COMMERCIAL

## 2024-10-16 DIAGNOSIS — E78.2 MIXED HYPERLIPIDEMIA: ICD-10-CM

## 2024-10-16 DIAGNOSIS — I10 ESSENTIAL HYPERTENSION, BENIGN: ICD-10-CM

## 2024-10-16 DIAGNOSIS — K31.89 GASTRIC MASS: ICD-10-CM

## 2024-10-16 DIAGNOSIS — K31.89 OTHER DISEASES OF STOMACH AND DUODENUM: ICD-10-CM

## 2024-10-16 LAB
ABO GROUP BLD: NORMAL
ALBUMIN SERPL BCG-MCNC: 4.3 G/DL (ref 3.5–5)
ALP SERPL-CCNC: 68 U/L (ref 34–104)
ALT SERPL W P-5'-P-CCNC: 20 U/L (ref 7–52)
ANION GAP SERPL CALCULATED.3IONS-SCNC: 8 MMOL/L (ref 4–13)
APTT PPP: 32 SECONDS (ref 23–34)
AST SERPL W P-5'-P-CCNC: 19 U/L (ref 13–39)
BILIRUB SERPL-MCNC: 1.11 MG/DL (ref 0.2–1)
BLD GP AB SCN SERPL QL: NEGATIVE
BUN SERPL-MCNC: 12 MG/DL (ref 5–25)
CALCIUM SERPL-MCNC: 9.5 MG/DL (ref 8.4–10.2)
CHLORIDE SERPL-SCNC: 104 MMOL/L (ref 96–108)
CHOLEST SERPL-MCNC: 141 MG/DL
CO2 SERPL-SCNC: 31 MMOL/L (ref 21–32)
CREAT SERPL-MCNC: 0.57 MG/DL (ref 0.6–1.3)
EST. AVERAGE GLUCOSE BLD GHB EST-MCNC: 120 MG/DL
GFR SERPL CREATININE-BSD FRML MDRD: 100 ML/MIN/1.73SQ M
GLUCOSE P FAST SERPL-MCNC: 99 MG/DL (ref 65–99)
HBA1C MFR BLD: 5.8 %
HDLC SERPL-MCNC: 44 MG/DL
INR PPP: 0.98 (ref 0.85–1.19)
LDLC SERPL CALC-MCNC: 76 MG/DL (ref 0–100)
NONHDLC SERPL-MCNC: 97 MG/DL
POTASSIUM SERPL-SCNC: 4.4 MMOL/L (ref 3.5–5.3)
PROT SERPL-MCNC: 6.9 G/DL (ref 6.4–8.4)
PROTHROMBIN TIME: 13.3 SECONDS (ref 12.3–15)
RH BLD: POSITIVE
SODIUM SERPL-SCNC: 143 MMOL/L (ref 135–147)
SPECIMEN EXPIRATION DATE: NORMAL
TRIGL SERPL-MCNC: 103 MG/DL

## 2024-10-16 PROCEDURE — 85730 THROMBOPLASTIN TIME PARTIAL: CPT

## 2024-10-16 PROCEDURE — 86850 RBC ANTIBODY SCREEN: CPT | Performed by: SURGERY

## 2024-10-16 PROCEDURE — 86901 BLOOD TYPING SEROLOGIC RH(D): CPT | Performed by: SURGERY

## 2024-10-16 PROCEDURE — 86900 BLOOD TYPING SEROLOGIC ABO: CPT | Performed by: SURGERY

## 2024-10-16 PROCEDURE — 36415 COLL VENOUS BLD VENIPUNCTURE: CPT

## 2024-10-16 PROCEDURE — 83036 HEMOGLOBIN GLYCOSYLATED A1C: CPT

## 2024-10-16 PROCEDURE — 85610 PROTHROMBIN TIME: CPT

## 2024-10-16 PROCEDURE — 80053 COMPREHEN METABOLIC PANEL: CPT

## 2024-10-16 PROCEDURE — 80061 LIPID PANEL: CPT

## 2024-10-17 NOTE — PRE-PROCEDURE INSTRUCTIONS
Pre-Surgery Instructions:   Medication Instructions    Acetaminophen (TYLENOL ARTHRITIS PAIN PO) Uses PRN- OK to take day of surgery    amLODIPine (NORVASC) 5 mg tablet Take day of surgery.    atorvastatin (LIPITOR) 20 mg tablet Take day of surgery.    benazepril (LOTENSIN) 20 mg tablet Hold day of surgery.    gabapentin (NEURONTIN) 300 mg capsule Take night before surgery    Multiple Vitamins-Minerals (CENTRUM SILVER PO) Stop taking 7 days prior to surgery.    pantoprazole (PROTONIX) 40 mg tablet Take day of surgery.    spironolactone (ALDACTONE) 25 mg tablet Hold day of surgery.      Medication instructions for day surgery reviewed. Please use only a sip of water to take your instructed medications. Avoid all over the counter vitamins, supplements and NSAIDS for one week prior to surgery per anesthesia guidelines. Tylenol is ok to take as needed.     You will receive a call one business day prior to surgery with an arrival time and hospital directions. If your surgery is scheduled on a Monday, the hospital will be calling you on the Friday prior to your surgery. If you have not heard from anyone by 8pm, please call the hospital supervisor through the hospital  at [phone]. ([place] [phone] or [place] [phone]).    Do not eat or drink anything after midnight the night before your surgery, including candy, mints, lifesavers, or chewing gum. Do not drink alcohol 24hrs before your surgery. Try not to smoke at least 24hrs before your surgery.       Follow the pre surgery showering instructions as listed in the “My Surgical Experience Booklet” or otherwise provided by your surgeon's office. Do not use a blade to shave the surgical area 1 week before surgery. It is okay to use a clean electric clippers up to 24 hours before surgery. Do not apply any lotions, creams, including makeup, cologne, deodorant, or perfumes after showering on the day of your surgery. Do not use dry shampoo, hair spray, hair  gel, or any type of hair products.     No contact lenses, eye make-up, or artificial eyelashes. Remove nail polish, including gel polish, and any artificial, gel, or acrylic nails if possible. Remove all jewelry including rings and body piercing jewelry.     Wear causal clothing that is easy to take on and off. Consider your type of surgery.    Keep any valuables, jewelry, piercings at home. Please bring any specially ordered equipment (sling, braces) if indicated.    Arrange for a responsible person to drive you to and from the hospital on the day of your surgery. Please confirm the visitor policy for the day of your procedure when you receive your phone call with an arrival time.     Call the surgeon's office with any new illnesses, exposures, or additional questions prior to surgery.    Please reference your “My Surgical Experience Booklet” for additional information to prepare for your upcoming surgery.

## 2024-10-21 DIAGNOSIS — M48.062 LUMBAR STENOSIS WITH NEUROGENIC CLAUDICATION: ICD-10-CM

## 2024-10-21 RX ORDER — GABAPENTIN 300 MG/1
300 CAPSULE ORAL
Qty: 30 CAPSULE | Refills: 2 | Status: SHIPPED | OUTPATIENT
Start: 2024-10-21

## 2024-10-21 NOTE — TELEPHONE ENCOUNTER
Patient called the refill line stating that she had missed a call. Patient states that she is still taking this and it does help. Patient is aware she hasn't been there in awhile and will schedule an appointment for the future but is not able to right now due to getting surgery. Please review and contact patient if there are any issues.

## 2024-10-21 NOTE — TELEPHONE ENCOUNTER
Attempted to reach pt, left detailed message on machine advising script sent to pharmacy (per medical communication consent on file), provided CB# and OH.

## 2024-10-29 ENCOUNTER — ANESTHESIA EVENT (OUTPATIENT)
Dept: PERIOP | Facility: HOSPITAL | Age: 59
End: 2024-10-29
Payer: COMMERCIAL

## 2024-10-29 ENCOUNTER — TELEPHONE (OUTPATIENT)
Dept: SURGICAL ONCOLOGY | Facility: CLINIC | Age: 59
End: 2024-10-29

## 2024-10-29 NOTE — TELEPHONE ENCOUNTER
Patient calling to inquire at what time she should arrive to surgery tomorrow. Advised patient someone would be calling her today between 2-8pm and will share with her this information. Patient agrees with plan and has no further questions or concerns.

## 2024-10-29 NOTE — ANESTHESIA PREPROCEDURE EVALUATION
Procedure:  PARTIAL GASTRECTOMY (Abdomen)    Relevant Problems   CARDIO   (+) Essential hypertension   (+) Hyperlipidemia      GI/HEPATIC   (+) Acute gastric ulcer with perforation (HCC)   (+) Perforated ulcer (HCC)      MUSCULOSKELETAL   (+) Generalized osteoarthritis      NEURO/PSYCH   (+) Paresthesia of hand, bilateral      Surgery/Wound/Pain   (+) S/P exploratory laparotomy      Orthopedic/Musculoskeletal   (+) Lumbar stenosis with neurogenic claudication      FEN/Gastrointestinal   (+) Leiomyoma of stomach      Other   (+) Gastric mass     EKG:   Normal sinus rhythm  Normal ECG  When compared with ECG of 12-JUL-2024 17:10, (unconfirmed)  No significant change was found  Confirmed by Osvaldo Pacheco ([id number]) on 7/14/2024 8:18:22 AM      Specimen Collected: 07/12/24 17:10        CBC & CMP: WNL    Physical Exam    Airway    Mallampati score: II  TM Distance: >3 FB  Neck ROM: full     Dental       Cardiovascular      Pulmonary      Other Findings  post-pubertal.      Anesthesia Plan  ASA Score- 3     Anesthesia Type- general with ASA Monitors.         Additional Monitors:     Airway Plan: ETT.    Comment: Epidural for post-op pain.       Plan Factors-    Chart reviewed. EKG reviewed.  Existing labs reviewed. Patient summary reviewed.    Patient is not a current smoker.  Patient did not smoke on day of surgery.            Induction- intravenous.    Postoperative Plan- Plan for postoperative opioid use. Planned trial extubation    Perioperative Resuscitation Plan - Level 1 - Full Code.       Informed Consent- Anesthetic plan and risks discussed with patient.  I personally reviewed this patient with the CRNA. Discussed and agreed on the Anesthesia Plan with the CRNA..

## 2024-10-30 ENCOUNTER — ANESTHESIA (OUTPATIENT)
Dept: PERIOP | Facility: HOSPITAL | Age: 59
End: 2024-10-30
Payer: COMMERCIAL

## 2024-10-30 ENCOUNTER — HOSPITAL ENCOUNTER (INPATIENT)
Facility: HOSPITAL | Age: 59
LOS: 4 days | Discharge: HOME | DRG: 328 | End: 2024-11-03
Attending: SURGERY | Admitting: SURGERY
Payer: COMMERCIAL

## 2024-10-30 DIAGNOSIS — K31.89 GASTRIC MASS: ICD-10-CM

## 2024-10-30 DIAGNOSIS — D21.4 LEIOMYOMA OF STOMACH: Primary | ICD-10-CM

## 2024-10-30 PROCEDURE — 43610 EXCISION OF STOMACH LESION: CPT | Performed by: SURGERY

## 2024-10-30 PROCEDURE — 88342 IMHCHEM/IMCYTCHM 1ST ANTB: CPT | Performed by: PATHOLOGY

## 2024-10-30 PROCEDURE — 0DNU0ZZ RELEASE OMENTUM, OPEN APPROACH: ICD-10-PCS | Performed by: SURGERY

## 2024-10-30 PROCEDURE — 88341 IMHCHEM/IMCYTCHM EA ADD ANTB: CPT | Performed by: PATHOLOGY

## 2024-10-30 PROCEDURE — 88360 TUMOR IMMUNOHISTOCHEM/MANUAL: CPT | Performed by: PATHOLOGY

## 2024-10-30 PROCEDURE — 88309 TISSUE EXAM BY PATHOLOGIST: CPT | Performed by: PATHOLOGY

## 2024-10-30 PROCEDURE — NC001 PR NO CHARGE: Performed by: SURGERY

## 2024-10-30 PROCEDURE — 0DB60ZZ EXCISION OF STOMACH, OPEN APPROACH: ICD-10-PCS | Performed by: SURGERY

## 2024-10-30 PROCEDURE — 0DNL0ZZ RELEASE TRANSVERSE COLON, OPEN APPROACH: ICD-10-PCS | Performed by: SURGERY

## 2024-10-30 RX ORDER — METRONIDAZOLE 500 MG/100ML
500 INJECTION, SOLUTION INTRAVENOUS ONCE
Status: COMPLETED | OUTPATIENT
Start: 2024-10-30 | End: 2024-10-30

## 2024-10-30 RX ORDER — ONDANSETRON 2 MG/ML
4 INJECTION INTRAMUSCULAR; INTRAVENOUS ONCE AS NEEDED
Status: DISCONTINUED | OUTPATIENT
Start: 2024-10-30 | End: 2024-10-30 | Stop reason: HOSPADM

## 2024-10-30 RX ORDER — HYDROMORPHONE HCL/PF 1 MG/ML
SYRINGE (ML) INJECTION AS NEEDED
Status: DISCONTINUED | OUTPATIENT
Start: 2024-10-30 | End: 2024-10-30

## 2024-10-30 RX ORDER — ALBUMIN, HUMAN INJ 5% 5 %
SOLUTION INTRAVENOUS CONTINUOUS PRN
Status: DISCONTINUED | OUTPATIENT
Start: 2024-10-30 | End: 2024-10-30

## 2024-10-30 RX ORDER — SODIUM CHLORIDE, SODIUM LACTATE, POTASSIUM CHLORIDE, CALCIUM CHLORIDE 600; 310; 30; 20 MG/100ML; MG/100ML; MG/100ML; MG/100ML
125 INJECTION, SOLUTION INTRAVENOUS CONTINUOUS
Status: DISCONTINUED | OUTPATIENT
Start: 2024-10-30 | End: 2024-10-30

## 2024-10-30 RX ORDER — ONDANSETRON 2 MG/ML
4 INJECTION INTRAMUSCULAR; INTRAVENOUS EVERY 4 HOURS PRN
Status: DISCONTINUED | OUTPATIENT
Start: 2024-10-30 | End: 2024-11-03 | Stop reason: HOSPADM

## 2024-10-30 RX ORDER — ALBUTEROL SULFATE 0.83 MG/ML
2.5 SOLUTION RESPIRATORY (INHALATION) ONCE AS NEEDED
Status: DISCONTINUED | OUTPATIENT
Start: 2024-10-30 | End: 2024-10-30 | Stop reason: HOSPADM

## 2024-10-30 RX ORDER — SODIUM CHLORIDE, SODIUM LACTATE, POTASSIUM CHLORIDE, CALCIUM CHLORIDE 600; 310; 30; 20 MG/100ML; MG/100ML; MG/100ML; MG/100ML
125 INJECTION, SOLUTION INTRAVENOUS CONTINUOUS
Status: DISCONTINUED | OUTPATIENT
Start: 2024-10-30 | End: 2024-10-31

## 2024-10-30 RX ORDER — KETOROLAC TROMETHAMINE 30 MG/ML
15 INJECTION, SOLUTION INTRAMUSCULAR; INTRAVENOUS EVERY 6 HOURS
Status: DISCONTINUED | OUTPATIENT
Start: 2024-10-30 | End: 2024-10-30

## 2024-10-30 RX ORDER — SODIUM CHLORIDE 9 MG/ML
INJECTION, SOLUTION INTRAVENOUS CONTINUOUS PRN
Status: DISCONTINUED | OUTPATIENT
Start: 2024-10-30 | End: 2024-10-30

## 2024-10-30 RX ORDER — FENTANYL CITRATE/PF 50 MCG/ML
50 SYRINGE (ML) INJECTION
Status: DISCONTINUED | OUTPATIENT
Start: 2024-10-30 | End: 2024-10-30 | Stop reason: HOSPADM

## 2024-10-30 RX ORDER — HYDROMORPHONE HCL/PF 1 MG/ML
0.5 SYRINGE (ML) INJECTION
Status: DISCONTINUED | OUTPATIENT
Start: 2024-10-30 | End: 2024-10-30 | Stop reason: HOSPADM

## 2024-10-30 RX ORDER — ACETAMINOPHEN 325 MG/1
650 TABLET ORAL EVERY 6 HOURS
Status: DISCONTINUED | OUTPATIENT
Start: 2024-10-30 | End: 2024-10-30

## 2024-10-30 RX ORDER — DIPHENHYDRAMINE HYDROCHLORIDE 50 MG/ML
12.5 INJECTION INTRAMUSCULAR; INTRAVENOUS ONCE AS NEEDED
Status: DISCONTINUED | OUTPATIENT
Start: 2024-10-30 | End: 2024-10-30 | Stop reason: HOSPADM

## 2024-10-30 RX ORDER — LIDOCAINE HYDROCHLORIDE AND EPINEPHRINE 15; 5 MG/ML; UG/ML
INJECTION, SOLUTION EPIDURAL
Status: COMPLETED | OUTPATIENT
Start: 2024-10-30 | End: 2024-10-30

## 2024-10-30 RX ORDER — ROPIVACAINE HYDROCHLORIDE 2 MG/ML
INJECTION, SOLUTION EPIDURAL; INFILTRATION; PERINEURAL CONTINUOUS PRN
Status: DISCONTINUED | OUTPATIENT
Start: 2024-10-30 | End: 2024-10-30

## 2024-10-30 RX ORDER — LABETALOL HYDROCHLORIDE 5 MG/ML
10 INJECTION, SOLUTION INTRAVENOUS EVERY 4 HOURS PRN
Status: DISCONTINUED | OUTPATIENT
Start: 2024-10-30 | End: 2024-11-03 | Stop reason: HOSPADM

## 2024-10-30 RX ORDER — PROPOFOL 10 MG/ML
INJECTION, EMULSION INTRAVENOUS AS NEEDED
Status: DISCONTINUED | OUTPATIENT
Start: 2024-10-30 | End: 2024-10-30

## 2024-10-30 RX ORDER — SODIUM CHLORIDE, SODIUM LACTATE, POTASSIUM CHLORIDE, CALCIUM CHLORIDE 600; 310; 30; 20 MG/100ML; MG/100ML; MG/100ML; MG/100ML
INJECTION, SOLUTION INTRAVENOUS CONTINUOUS PRN
Status: DISCONTINUED | OUTPATIENT
Start: 2024-10-30 | End: 2024-10-30

## 2024-10-30 RX ORDER — ONDANSETRON 2 MG/ML
INJECTION INTRAMUSCULAR; INTRAVENOUS AS NEEDED
Status: DISCONTINUED | OUTPATIENT
Start: 2024-10-30 | End: 2024-10-30

## 2024-10-30 RX ORDER — ROCURONIUM BROMIDE 10 MG/ML
INJECTION, SOLUTION INTRAVENOUS AS NEEDED
Status: DISCONTINUED | OUTPATIENT
Start: 2024-10-30 | End: 2024-10-30

## 2024-10-30 RX ORDER — CEFAZOLIN SODIUM 2 G/50ML
2000 SOLUTION INTRAVENOUS ONCE
Status: COMPLETED | OUTPATIENT
Start: 2024-10-30 | End: 2024-10-30

## 2024-10-30 RX ORDER — HYDROMORPHONE HCL/PF 1 MG/ML
1 SYRINGE (ML) INJECTION EVERY 2 HOUR PRN
Status: DISCONTINUED | OUTPATIENT
Start: 2024-10-30 | End: 2024-10-30

## 2024-10-30 RX ORDER — MAGNESIUM HYDROXIDE 1200 MG/15ML
LIQUID ORAL AS NEEDED
Status: DISCONTINUED | OUTPATIENT
Start: 2024-10-30 | End: 2024-10-30 | Stop reason: HOSPADM

## 2024-10-30 RX ORDER — DEXAMETHASONE SODIUM PHOSPHATE 10 MG/ML
INJECTION, SOLUTION INTRAMUSCULAR; INTRAVENOUS AS NEEDED
Status: DISCONTINUED | OUTPATIENT
Start: 2024-10-30 | End: 2024-10-30

## 2024-10-30 RX ORDER — FENTANYL CITRATE 50 UG/ML
INJECTION, SOLUTION INTRAMUSCULAR; INTRAVENOUS AS NEEDED
Status: DISCONTINUED | OUTPATIENT
Start: 2024-10-30 | End: 2024-10-30

## 2024-10-30 RX ORDER — ACETAMINOPHEN 10 MG/ML
1000 INJECTION, SOLUTION INTRAVENOUS EVERY 8 HOURS SCHEDULED
Status: DISCONTINUED | OUTPATIENT
Start: 2024-10-30 | End: 2024-10-31

## 2024-10-30 RX ORDER — MIDAZOLAM HYDROCHLORIDE 2 MG/2ML
INJECTION, SOLUTION INTRAMUSCULAR; INTRAVENOUS AS NEEDED
Status: DISCONTINUED | OUTPATIENT
Start: 2024-10-30 | End: 2024-10-30

## 2024-10-30 RX ORDER — HEPARIN SODIUM 5000 [USP'U]/ML
5000 INJECTION, SOLUTION INTRAVENOUS; SUBCUTANEOUS EVERY 8 HOURS SCHEDULED
Status: DISCONTINUED | OUTPATIENT
Start: 2024-10-31 | End: 2024-11-03 | Stop reason: HOSPADM

## 2024-10-30 RX ORDER — PANTOPRAZOLE SODIUM 40 MG/10ML
40 INJECTION, POWDER, LYOPHILIZED, FOR SOLUTION INTRAVENOUS EVERY 12 HOURS SCHEDULED
Status: DISCONTINUED | OUTPATIENT
Start: 2024-10-30 | End: 2024-11-03 | Stop reason: HOSPADM

## 2024-10-30 RX ORDER — FENTANYL CITRATE/PF 50 MCG/ML
50 SYRINGE (ML) INJECTION
Status: COMPLETED | OUTPATIENT
Start: 2024-10-30 | End: 2024-10-30

## 2024-10-30 RX ORDER — LIDOCAINE HYDROCHLORIDE 10 MG/ML
INJECTION, SOLUTION EPIDURAL; INFILTRATION; INTRACAUDAL; PERINEURAL AS NEEDED
Status: DISCONTINUED | OUTPATIENT
Start: 2024-10-30 | End: 2024-10-30

## 2024-10-30 RX ADMIN — PHENYLEPHRINE HYDROCHLORIDE 20 MCG/MIN: 10 INJECTION INTRAVENOUS at 13:57

## 2024-10-30 RX ADMIN — ROPIVACAINE HYDROCHLORIDE: 5 INJECTION EPIDURAL; INFILTRATION; PERINEURAL at 16:08

## 2024-10-30 RX ADMIN — HYDROMORPHONE HYDROCHLORIDE 0.5 MG: 1 INJECTION, SOLUTION INTRAMUSCULAR; INTRAVENOUS; SUBCUTANEOUS at 15:39

## 2024-10-30 RX ADMIN — FENTANYL CITRATE 50 MCG: 50 INJECTION INTRAMUSCULAR; INTRAVENOUS at 13:11

## 2024-10-30 RX ADMIN — Medication: at 16:21

## 2024-10-30 RX ADMIN — HYDROMORPHONE HYDROCHLORIDE 0.5 MG: 1 INJECTION, SOLUTION INTRAMUSCULAR; INTRAVENOUS; SUBCUTANEOUS at 16:35

## 2024-10-30 RX ADMIN — PHENYLEPHRINE HYDROCHLORIDE 2 DROP: 1 SPRAY NASAL at 13:19

## 2024-10-30 RX ADMIN — ROCURONIUM BROMIDE 30 MG: 10 INJECTION, SOLUTION INTRAVENOUS at 13:52

## 2024-10-30 RX ADMIN — METRONIDAZOLE: 500 SOLUTION INTRAVENOUS at 13:15

## 2024-10-30 RX ADMIN — LIDOCAINE HYDROCHLORIDE 50 MG: 10 INJECTION, SOLUTION EPIDURAL; INFILTRATION; INTRACAUDAL; PERINEURAL at 13:11

## 2024-10-30 RX ADMIN — HYDROMORPHONE HYDROCHLORIDE: 10 INJECTION, SOLUTION INTRAMUSCULAR; INTRAVENOUS; SUBCUTANEOUS at 17:34

## 2024-10-30 RX ADMIN — ONDANSETRON 4 MG: 2 INJECTION INTRAMUSCULAR; INTRAVENOUS at 14:52

## 2024-10-30 RX ADMIN — SODIUM CHLORIDE, SODIUM LACTATE, POTASSIUM CHLORIDE, AND CALCIUM CHLORIDE 125 ML/HR: .6; .31; .03; .02 INJECTION, SOLUTION INTRAVENOUS at 09:40

## 2024-10-30 RX ADMIN — ROCURONIUM BROMIDE 20 MG: 10 INJECTION, SOLUTION INTRAVENOUS at 14:35

## 2024-10-30 RX ADMIN — ACETAMINOPHEN 1000 MG: 10 INJECTION INTRAVENOUS at 21:02

## 2024-10-30 RX ADMIN — PROPOFOL 200 MG: 10 INJECTION, EMULSION INTRAVENOUS at 13:11

## 2024-10-30 RX ADMIN — SODIUM CHLORIDE: 9 INJECTION, SOLUTION INTRAVENOUS at 13:20

## 2024-10-30 RX ADMIN — FENTANYL CITRATE 50 MCG: 50 INJECTION INTRAMUSCULAR; INTRAVENOUS at 15:52

## 2024-10-30 RX ADMIN — PANTOPRAZOLE SODIUM 40 MG: 40 INJECTION, POWDER, FOR SOLUTION INTRAVENOUS at 21:02

## 2024-10-30 RX ADMIN — SODIUM CHLORIDE, SODIUM LACTATE, POTASSIUM CHLORIDE, AND CALCIUM CHLORIDE: .6; .31; .03; .02 INJECTION, SOLUTION INTRAVENOUS at 11:40

## 2024-10-30 RX ADMIN — FENTANYL CITRATE 100 MCG: 50 INJECTION, SOLUTION INTRAMUSCULAR; INTRAVENOUS at 13:15

## 2024-10-30 RX ADMIN — FENTANYL CITRATE 50 MCG: 50 INJECTION INTRAMUSCULAR; INTRAVENOUS at 12:42

## 2024-10-30 RX ADMIN — ROCURONIUM BROMIDE 50 MG: 10 INJECTION, SOLUTION INTRAVENOUS at 13:11

## 2024-10-30 RX ADMIN — DEXMEDETOMIDINE HYDROCHLORIDE 8 MCG: 100 INJECTION, SOLUTION INTRAVENOUS at 14:55

## 2024-10-30 RX ADMIN — DEXMEDETOMIDINE HYDROCHLORIDE 8 MCG: 100 INJECTION, SOLUTION INTRAVENOUS at 15:04

## 2024-10-30 RX ADMIN — ALBUMIN (HUMAN): 12.5 INJECTION, SOLUTION INTRAVENOUS at 13:19

## 2024-10-30 RX ADMIN — ROPIVACAINE HYDROCHLORIDE 6 ML: 2 INJECTION, SOLUTION EPIDURAL; INFILTRATION at 12:50

## 2024-10-30 RX ADMIN — HYDROMORPHONE HYDROCHLORIDE 0.5 MG: 1 INJECTION, SOLUTION INTRAMUSCULAR; INTRAVENOUS; SUBCUTANEOUS at 17:48

## 2024-10-30 RX ADMIN — SODIUM CHLORIDE, SODIUM LACTATE, POTASSIUM CHLORIDE, AND CALCIUM CHLORIDE: .6; .31; .03; .02 INJECTION, SOLUTION INTRAVENOUS at 13:20

## 2024-10-30 RX ADMIN — MIDAZOLAM 2 MG: 1 INJECTION INTRAMUSCULAR; INTRAVENOUS at 12:42

## 2024-10-30 RX ADMIN — ROPIVACAINE HYDROCHLORIDE 6 ML/HR: 2 INJECTION, SOLUTION EPIDURAL; INFILTRATION at 13:45

## 2024-10-30 RX ADMIN — DEXAMETHASONE SODIUM PHOSPHATE 10 MG: 10 INJECTION, SOLUTION INTRAMUSCULAR; INTRAVENOUS at 13:11

## 2024-10-30 RX ADMIN — HYDROMORPHONE HYDROCHLORIDE 0.5 MG: 1 INJECTION, SOLUTION INTRAMUSCULAR; INTRAVENOUS; SUBCUTANEOUS at 14:38

## 2024-10-30 RX ADMIN — LIDOCAINE HYDROCHLORIDE AND EPINEPHRINE 5 ML: 15; 5 INJECTION, SOLUTION EPIDURAL at 11:44

## 2024-10-30 RX ADMIN — CEFAZOLIN SODIUM 2000 MG: 2 SOLUTION INTRAVENOUS at 13:30

## 2024-10-30 RX ADMIN — FENTANYL CITRATE 50 MCG: 50 INJECTION INTRAMUSCULAR; INTRAVENOUS at 16:09

## 2024-10-30 RX ADMIN — SUGAMMADEX 300 MG: 100 INJECTION, SOLUTION INTRAVENOUS at 15:11

## 2024-10-30 NOTE — ANESTHESIA POSTPROCEDURE EVALUATION
Post-Op Assessment Note    CV Status:  Stable  Pain Score: 7    Pain management: adequate    Multimodal analgesia used between 6 hours prior to anesthesia start to PACU discharge   Post-op block assessment: no complications   Mental Status:  Awake and somnolent   Hydration Status:  Stable   PONV Controlled:  None   Airway Patency:  Patent     Post Op Vitals Reviewed: Yes    No anethesia notable event occurred.    Staff: CRNA   Comments: Patient in PACU, airway patent, VSS. C/o abdominal pain, receiving pain medicine IVP. No c/o nausea.          Last Filed PACU Vitals:  Vitals Value Taken Time   Temp 98.7 °F (37.1 °C) 10/30/24 1542   Pulse 92 10/30/24 1542   /73 10/30/24 1542   Resp 13 10/30/24 1541   SpO2 100 % 10/30/24 1542   Vitals shown include unfiled device data.    Modified Gemma:  No data recorded

## 2024-10-30 NOTE — OP NOTE
OPERATIVE REPORT  PATIENT NAME: Nidhi Jeffries    :  1965  MRN: [id number]  Pt Location: [facility] OR ROOM 15    SURGERY DATE: 10/30/2024    Surgeons and Role:     * Dixon Valencia MD - Primary     * Ángel Tee DO - Assisting    Preop Diagnosis:  Gastric mass [K31.89]  Leiomyoma of stomach [D21.4]    Post-Op Diagnosis Codes:     * Gastric mass [K31.89]     * Leiomyoma of stomach [D21.4]    Procedure(s):  PARTIAL GASTRECTOMY LYSIS OF ADHESIONS    Specimen(s):  ID Type Source Tests Collected by Time Destination   1 : resection of gastric mass Tissue Stomach TISSUE EXAM Dixon Valencia MD 10/30/2024  2:42 PM        Estimated Blood Loss:   200 mL    Drains:  Closed/Suction Drain Lateral;Right RLQ Bulb 19 Fr. (Active)   Number of days: 112       NG/OG/Enteral Tube Nasogastric 18 Fr Left nare (Active)   Number of days: 0       Urethral Catheter Latex 16 Fr. (Active)   Number of days: 0       Anesthesia Type:   General    Operative Indications:  Gastric mass [K31.89]  Leiomyoma of stomach [D21.4]  59-year-old female with a submucosal gastric mass in the cardia of the stomach.  It was recommended that she undergo excision.  Risks and benefits were explained.  Informed consent was obtained.  Patient was brought to the operating room.    Operative Findings:  Submucosal mass in the cardia of the stomach.  No evidence of metastatic disease.      Complications:   None    Procedure and Technique:  After identifying the patient, general anesthesia was achieved.  A Rosenbaum catheter was placed.  Lines were placed by anesthesia.  Patient was prepped and draped in the usual sterile fashion.  A timeout was performed.  Upper midline incision was made.  Using sharp dissection this was the skin, subtenons tissue and down through the fascia and into the peritoneal cavity.  We had to lyse adhesions from her previous surgery of colon and omentum off the anterior abdominal wall as well as omentum away from the liver until we got to her  Amor patch repair.  Once these adhesions were lysed, we now placed the Bookwalter retractor.  We now got into the lesser sac through the gastrocolic omentum.  Using the Enseal device we marched up the greater curve of the stomach freeing this area up including the short gastrics.  The phrenoesophageal ligament was divided so we could get some length along the esophagus.  Once we did this, we completed our mobilization of the greater curve by dividing the last 1-2 short gastric vessels until the entire greater curve of the stomach was freed.  We now divided along the lesser curve freeing up the entire proximal stomach circumferentially.  At this point we could easily palpate the mass.  This appeared to be more posteriorly and laterally rather than on the lesser curve.  This did appear to be away from the GE junction when we retracted the mass laterally, but to ensure that we did not encroach upon the GE junction, the NG tube was removed and we placed a 60 Pashto bougie into the stomach.  Once this was in good position, we were able to manipulate the mass completely away from the GE junction.  Now using sequential fires of a 45 green load stapler, the mass and a portion of normal stomach was excised in total.  Specimen was handed off the table.  We now visualized the staple line.  There were 2 areas of oozing and these were oversewn with interrupted 2-0 silk suture in a Lembert fashion.  Wound was now copiously irrigated.  There was excellent hemostasis.  The Bookwalter retractor was removed.  The stomach was placed in its normal anatomic position.  There was no evidence of any bleeding.  Sponge and needle counts were correct.  The fascia was approximated with #1 none looped PDS suture starting at either end of the incision and secured centrally.  Subcutaneous tissue was irrigated.  Skin was approximated with interrupted 3-0 Vicryl suture subcutaneously and a running 4-0 Monocryl suture in a subcuticular fashion.   Skin glue was placed on the skin.  Patient was extubated and taken to the recovery room in stable condition having tolerated the procedure well.  I was present and participated in all aspects of this procedure.       I was present for the entire procedure.    Patient Disposition:  PACU              SIGNATURE: Dixon Valnecia MD  DATE: October 30, 2024  TIME: 3:22 PM

## 2024-10-30 NOTE — ANESTHESIA PROCEDURE NOTES
Epidural Block    Patient location during procedure: OR  Start time: 10/30/2024 11:35 AM  Reason for block: procedure for pain, at surgeon's request and post-op pain management  Staffing  Performed by: Jaiden Montez MD  Authorized by: Jaiden Montez MD    Preanesthetic Checklist  Completed: patient identified, IV checked, site marked, risks and benefits discussed, surgical consent, monitors and equipment checked, pre-op evaluation and timeout performed  Epidural  Patient position: sitting  Prep: Betadine  Sedation Level: moderate sedation  Patient monitoring: continuous pulse oximetry and frequent blood pressure checks  Approach: midline  Location: thoracic, T8-9  Injection technique: DEVEN saline  Needle  Needle type: Tuohy   Needle gauge: 18 G  Needle insertion depth: 7 cm  Catheter type: side hole  Catheter size: 20 G  Catheter at skin depth: 12 cm  Catheter securement method: clear occlusive dressing and tape  Test dose: negativelidocaine-epinephrine (XYLOCAINE-MPF/EPINEPHRINE) 1.5 %-1:200,000 injection 3 mL - Epidural   5 mL - 10/30/2024 11:44:00 AM  Assessment  Number of attempts: 1negative aspiration for CSF, negative aspiration for heme and no paresthesia on injection  patient tolerated the procedure well with no immediate complications

## 2024-10-30 NOTE — H&P
Nidhi Cliftononey  1965  [id number]  [facility]  [facility] [address]  [place] [address]     1. Gastric mass  -     Ambulatory Referral to Surgical Oncology  -     Case request operating room: PARTIAL GASTRECTOMY; Standing  -     Type and screen; Future  -     Comprehensive metabolic panel; Future  -     CBC and differential; Future  -     APTT; Future  -     Protime-INR; Future  -     HEMOGLOBIN A1C W/ EAG ESTIMATION; Future  -     Case request operating room: PARTIAL GASTRECTOMY  2. Leiomyoma of stomach  Assessment & Plan:  58-year-old female with a gastric mass.  Biopsy reveals this is likely a leiomyoma.  She is asymptomatic.  Based on the size of this lesion, I would recommend that she undergo resection.  It may become symptomatic in the future.  The risks of partial gastrectomy were explained and included bleeding, infection, recurrence, need for further surgery, wound complications, adjacent organ injury, leak at the staple line, stricture, sepsis, MI, DVT, stroke, pulmonary embolism, and death.  Informed consent was obtained.  We will schedule this at our earliest mutual convenience.  She is agreeable to this plan.  All her questions were answered.  Orders:  -     Case request operating room: PARTIAL GASTRECTOMY; Standing  -     Case request operating room: PARTIAL GASTRECTOMY            Chief Complaint   Patient presents with    Consult         No follow-ups on file.         Oncology History     No history exists.         History of Present Illness: 58-year-old female who presented with abdominal pain in July 2024.  CT from July 10, 2024 revealed a 3 x 4.5 x 3.2 cm along the lesser curve near the GE junction.  There were also findings of a perforated gastric ulcer.  I personally reviewed the films.  This ulcer was repaired.  When she recovered she had workup of the gastric mass.  She then underwent endoscopic ultrasound on September 13, 2024.   This revealed a submucosal mass just distal to the GE junction seen on retroflexion.  This was 40 cm from the incisors.  This was within the submucosa.  Pathology revealed fragments of smooth muscle that was consistent with leiomyoma.   was negative.  He comes in now to discuss further therapy.  She denies any abdominal pain, nausea or vomiting.  No early satiety.  No change in appetite or unintentional weight loss.     Review of Systems  Complete ROS Surg Onc:   Constitutional: The patient denies new or recent history of general fatigue, no recent weight loss, no change in appetite.   Eyes: No complaints of visual problems, no scleral icterus.   ENT: no complaints of ear pain, no hoarseness, no difficulty swallowing,  no tinnitus and no new masses in head, oral cavity, or neck.   Cardiovascular: No complaints of chest pain, no palpitations, no ankle edema.   Respiratory: No complaints of shortness of breath, no cough.   Gastrointestinal: No complaints of jaundice, no bloody stools, no pale stools.   Genitourinary: No complaints of dysuria, no hematuria, no nocturia, no frequent urination, no urethral discharge.   Musculoskeletal: No complaints of weakness, paralysis, joint stiffness or arthralgias.  Integumentary: No complaints of rash, no new lesions.   Neurological: No complaints of convulsions, no seizures, no dizziness.   Hematologic/Lymphatic: No complaints of easy bruising.   Endocrine:  No hot or cold intolerance.  No polydipsia, polyphagia, or polyuria.  Allergy/immunology:  No environmental allergies.  No food allergies.  Not immunocompromised.  Skin:  No pallor or rash.  No wound.              Problem List       Patient Active Problem List   Diagnosis    Lumbar stenosis with neurogenic claudication    Essential hypertension    Generalized osteoarthritis    Hyperlipidemia    Lump, breast    Postmenopausal bleeding    Low libido    Intervertebral disc disorder with radiculopathy of lumbar region     Bilateral carpal tunnel syndrome    Paresthesia of hand, bilateral    Bilateral hand pain    S/P exploratory laparotomy    Perforated ulcer (HCC)    Gastric mass    Acute gastric ulcer with perforation (HCC)    Leiomyoma of stomach         Medical History        Past Medical History:   Diagnosis Date    Hypertension      Rheumatoid arthritis (HCC)           Surgical History         Past Surgical History:   Procedure Laterality Date     SECTION        LAPAROTOMY N/A 7/10/2024     Procedure: LAPAROTOMY EXPLORATORY, MODIFIED KYRIE PATCH;  Surgeon: Luis Alberto Lopez DO;  Location: [facility];  Service: General         Family History         Family History   Problem Relation Age of Onset    Heart disease Mother      Hypertension Mother      Diabetes Mother      No Known Problems Father      No Known Problems Maternal Grandmother      No Known Problems Maternal Grandfather      No Known Problems Paternal Grandmother      Prostate cancer Paternal Grandfather 70    No Known Problems Brother      No Known Problems Brother      No Known Problems Brother      No Known Problems Son      Breast cancer Maternal Aunt 70    Ovarian cancer Paternal Aunt      Endometrial cancer Paternal Aunt 36    No Known Problems Family      Colon cancer Neg Hx           Social History               Socioeconomic History    Marital status: /Civil Union       Spouse name: Not on file    Number of children: Not on file    Years of education: Not on file    Highest education level: Not on file   Occupational History    Not on file   Tobacco Use    Smoking status: Former    Smokeless tobacco: Never   Vaping Use    Vaping status: Never Used   Substance and Sexual Activity    Alcohol use: Not Currently    Drug use: No    Sexual activity: Not Currently       Partners: Male       Comment:    Other Topics Concern    Not on file   Social History Narrative    Not on file      Social Determinants of Health           Financial  Resource Strain: Not on file   Food Insecurity: No Food Insecurity (7/12/2024)     Hunger Vital Sign      Worried About Running Out of Food in the Last Year: Never true      Ran Out of Food in the Last Year: Never true   Transportation Needs: No Transportation Needs (7/12/2024)     PRAPARE - Transportation      Lack of Transportation (Medical): No      Lack of Transportation (Non-Medical): No   Physical Activity: Not on file   Stress: Not on file   Social Connections: Not on file   Intimate Partner Violence: Not on file   Housing Stability: Unknown (7/12/2024)     Housing Stability Vital Sign      Unable to Pay for Housing in the Last Year: No      Number of Times Moved in the Last Year: Not on file      Homeless in the Last Year: No           Current Medications      Current Outpatient Medications:     Acetaminophen (TYLENOL ARTHRITIS PAIN PO), Take by mouth, Disp: , Rfl:     amLODIPine-benazepril (LOTREL 5-20) 5-20 MG per capsule, Take 1 capsule by mouth daily, Disp: , Rfl:     atorvastatin (LIPITOR) 20 mg tablet, , Disp: , Rfl:     benazepril (LOTENSIN) 20 mg tablet, , Disp: , Rfl:     gabapentin (NEURONTIN) 300 mg capsule, TAKE ONE CAPSULE BY MOUTH ONCE DAILY at bedtime, Disp: 30 capsule, Rfl: 5    Multiple Vitamins-Minerals (CENTRUM SILVER PO), Take by mouth, Disp: , Rfl:     spironolactone (ALDACTONE) 25 mg tablet, , Disp: , Rfl:     amLODIPine (NORVASC) 5 mg tablet, , Disp: , Rfl:     pantoprazole (PROTONIX) 40 mg tablet, Take 1 tablet (40 mg total) by mouth 2 (two) times a day, Disp: 60 tablet, Rfl: 0          Allergies   Allergen Reactions    Sulfa Antibiotics            Vitals:     09/25/24 0815   BP: 112/80   Pulse: 76   Resp: 16   Temp: 97.5 °F (36.4 °C)   SpO2: 98%         Physical Exam   Constitutional: General appearance: The Patient is well-developed and well-nourished who appears the stated age in no acute distress. Patient is pleasant and talkative.     HEENT:  Normocephalic.  Sclerae are  anicteric. Mucous membranes are moist. Neck is supple without adenopathy. No JVD.     Chest: The lungs are clear to auscultation.     Cardiac: Heart is regular rate.     Abdomen: Abdomen is soft, non-tender, non-distended and without masses.     Extremities: There is no clubbing or cyanosis. There is no edema.  Symmetric.  Neuro: Grossly nonfocal. Gait is normal.     Lymphatic: No evidence of cervical adenopathy bilaterally.   No evidence of axillary adenopathy bilaterally.   Skin: Warm, anicteric.    Psych:  Patient is pleasant and talkative.  Breasts:        Pathology:  Final Diagnosis   A. Stomach, cell block, gastric mass:  - Fragments of smooth muscle, consistent leiomyoma in appropriate clinical settings.     Comment: Sections show bland smooth muscle which confirmed by positive SMA and desmin (both stain are strong and diffuse). There is no necrosis or mitotic figure identified. These cells are negative for CD34 and S100; , DOG1 highlight  scattered Cajal cells and mast cells. The differential diagnosis will include leiomyoma and muscular propria of stomach. Clinical and imaging correlation is suggested.    Electronically signed by Aidee Hodges MD on 9/19/2024 at  8:39 AM         Labs:        Imaging  CT chest wo contrast     Result Date: 9/24/2024  Narrative: CT CHEST WITHOUT IV CONTRAST INDICATION: K31.89: Other diseases of stomach and duodenum. Evaluate for metastatic disease. COMPARISON: CT abdomen pelvis 7/10/2024 TECHNIQUE: CT examination of the chest was performed without intravenous contrast. Multiplanar 2D reformatted images were created from the source data. This examination, like all CT scans performed in the [facility], was performed utilizing techniques to minimize radiation dose exposure, including the use of iterative reconstruction and automated exposure control. Radiation dose length product (DLP) for this visit: 338.68 mGy-cm FINDINGS: LUNGS: Lungs are clear. There is no  tracheal or endobronchial lesion. PLEURA: Unremarkable. HEART/GREAT VESSELS: Coronary artery calcifications. No thoracic aortic aneurysm. MEDIASTINUM AND KYLIE: Unremarkable. CHEST WALL AND LOWER NECK: Unremarkable. VISUALIZED STRUCTURES IN THE UPPER ABDOMEN: 4.8 cm x 3.5 cm mass in the gastric cardia (series 301 image 101). OSSEOUS STRUCTURES: No acute fracture or destructive osseous lesion.      Impression: 1. Redemonstration of 4.8 cm x 3.5 cm gastric cardia mass consistent with biopsy-proven leiomyoma. 2. No metastatic disease in the chest. Workstation performed: [id number]      Endoscopic ultrasonography, GI (Upper)     Result Date: 9/13/2024  Narrative: Table formatting from the original result was not included. [facility] [address] [facility] [address] [phone] DATE OF SERVICE: 9/13/24 PHYSICIAN(S): Attending: Jen Garza MD Fellow: Anup Fernando MD INDICATION: Gastric mass POST-OP DIAGNOSIS: See the impression below. PREPROCEDURE: Informed consent was obtained for the procedure, including sedation.  Risks of perforation, hemorrhage, adverse drug reaction and aspiration were discussed. The patient was placed in the left lateral decubitus position. Patient was explained about the risks and benefits of the procedure. Risks including but not limited to bleeding, infection, and perforation were explained in detail. Also explained about less than 100% sensitivity with the exam and other alternatives. PROCEDURE: EUS UPPER DETAILS OF PROCEDURE: Patient was taken to the procedure room where a time out was performed to confirm correct patient and correct procedure. The patient underwent monitored anesthesia care, which was administered by an anesthesia professional. The patient's blood pressure, heart rate, oxygen, respirations, level of consciousness, ECG and ETCO2 were monitored throughout the procedure. The endoscope, linear scope and radial scope were introduced through  the mouth. The patient experienced no blood loss. The procedure was not difficult. The patient tolerated the procedure well. There were no apparent adverse events. ANESTHESIA INFORMATION: ASA: III Anesthesia Type: IV Sedation with Anesthesia MEDICATIONS: No administrations occurring from 1227 to 1303 on 09/13/24 FINDINGS: The esophagus appeared normal. Submucosal mass just distal to the GE Junction seen on retroflexion. Z line at 37 cm from the incisors. Mass was located at 40 cm from the incisors. The duodenal bulb and 2nd part of the duodenum appeared normal. Heterogeneous, hypoechoic, lobulated and round mass measuring 4.1 mm x 3.4 mm with well-defined and smooth margins was visualized in the stomach, contained within the submucosa; 3 successful fine needle biopsy passes were taken with a 22 gauge needle using a transgastric approach guided by Doppler. An adequate sample was obtained. A sample was sent for histology analysis. Onsite cytologist was present SPECIMENS: ID Type Source Tests Collected by Time Destination 1 : gastric mass FNA Stomach NON-GYNECOLOGIC CYTOLOGY, FINE NEEDLE ASPIRATION/BIOPSY Jen Garza MD 9/13/2024 12:51 PM        Impression: The esophagus appeared normal. Submucosal mass just distal to the GE Junction seen on retroflexion. Z line at 37 cm from the incisors. Mass was located at 40 cm from the incisors. The duodenal bulb and 2nd part of the duodenum appeared normal. Heterogeneous, hypoechoic, lobulated and round mass measuring 4.1 mm x 3.4 mm with well-defined and smooth margins was visualized in the stomach, contained within the submucosa; 3 fine needle biopsy passes were taken. An adequate sample was obtained. A sample was sent for histology analysis. Onsite cytologist was present RECOMMENDATION: Follow up pathology results Referral to surgical oncology Jen Garza MD      I personally reviewed and interpreted the above laboratory and imaging data.

## 2024-10-31 LAB
ANION GAP SERPL CALCULATED.3IONS-SCNC: 6 MMOL/L (ref 4–13)
BASOPHILS # BLD AUTO: 0.01 THOUSANDS/ΜL (ref 0–0.1)
BASOPHILS NFR BLD AUTO: 0 % (ref 0–1)
BUN SERPL-MCNC: 11 MG/DL (ref 5–25)
CALCIUM SERPL-MCNC: 8.9 MG/DL (ref 8.4–10.2)
CHLORIDE SERPL-SCNC: 98 MMOL/L (ref 96–108)
CO2 SERPL-SCNC: 31 MMOL/L (ref 21–32)
CREAT SERPL-MCNC: 0.47 MG/DL (ref 0.6–1.3)
EOSINOPHIL # BLD AUTO: 0 THOUSAND/ΜL (ref 0–0.61)
EOSINOPHIL NFR BLD AUTO: 0 % (ref 0–6)
ERYTHROCYTE [DISTWIDTH] IN BLOOD BY AUTOMATED COUNT: 13.2 % (ref 11.6–15.1)
GFR SERPL CREATININE-BSD FRML MDRD: 106 ML/MIN/1.73SQ M
GLUCOSE SERPL-MCNC: 134 MG/DL (ref 65–140)
HCT VFR BLD AUTO: 42.3 % (ref 34.8–46.1)
HGB BLD-MCNC: 13.8 G/DL (ref 11.5–15.4)
IMM GRANULOCYTES # BLD AUTO: 0.07 THOUSAND/UL (ref 0–0.2)
IMM GRANULOCYTES NFR BLD AUTO: 1 % (ref 0–2)
LYMPHOCYTES # BLD AUTO: 0.62 THOUSANDS/ΜL (ref 0.6–4.47)
LYMPHOCYTES NFR BLD AUTO: 5 % (ref 14–44)
MAGNESIUM SERPL-MCNC: 1.8 MG/DL (ref 1.9–2.7)
MCH RBC QN AUTO: 29.2 PG (ref 26.8–34.3)
MCHC RBC AUTO-ENTMCNC: 32.6 G/DL (ref 31.4–37.4)
MCV RBC AUTO: 90 FL (ref 82–98)
MONOCYTES # BLD AUTO: 0.64 THOUSAND/ΜL (ref 0.17–1.22)
MONOCYTES NFR BLD AUTO: 5 % (ref 4–12)
NEUTROPHILS # BLD AUTO: 12.27 THOUSANDS/ΜL (ref 1.85–7.62)
NEUTS SEG NFR BLD AUTO: 89 % (ref 43–75)
NRBC BLD AUTO-RTO: 0 /100 WBCS
PHOSPHATE SERPL-MCNC: 3.8 MG/DL (ref 2.3–4.1)
PLATELET # BLD AUTO: 245 THOUSANDS/UL (ref 149–390)
PMV BLD AUTO: 10.5 FL (ref 8.9–12.7)
POTASSIUM SERPL-SCNC: 4.5 MMOL/L (ref 3.5–5.3)
RBC # BLD AUTO: 4.72 MILLION/UL (ref 3.81–5.12)
SODIUM SERPL-SCNC: 135 MMOL/L (ref 135–147)
WBC # BLD AUTO: 13.61 THOUSAND/UL (ref 4.31–10.16)

## 2024-10-31 PROCEDURE — 80048 BASIC METABOLIC PNL TOTAL CA: CPT | Performed by: SURGERY

## 2024-10-31 PROCEDURE — 83735 ASSAY OF MAGNESIUM: CPT | Performed by: SURGERY

## 2024-10-31 PROCEDURE — 99024 POSTOP FOLLOW-UP VISIT: CPT | Performed by: SURGERY

## 2024-10-31 PROCEDURE — 84100 ASSAY OF PHOSPHORUS: CPT | Performed by: SURGERY

## 2024-10-31 PROCEDURE — 97167 OT EVAL HIGH COMPLEX 60 MIN: CPT

## 2024-10-31 PROCEDURE — 85025 COMPLETE CBC W/AUTO DIFF WBC: CPT | Performed by: SURGERY

## 2024-10-31 PROCEDURE — 97163 PT EVAL HIGH COMPLEX 45 MIN: CPT

## 2024-10-31 RX ORDER — XYLITOL/YERBA SANTA
5 AEROSOL, SPRAY WITH PUMP (ML) MUCOUS MEMBRANE 4 TIMES DAILY PRN
Status: DISCONTINUED | OUTPATIENT
Start: 2024-10-31 | End: 2024-11-03 | Stop reason: HOSPADM

## 2024-10-31 RX ORDER — DEXTROSE MONOHYDRATE, SODIUM CHLORIDE, AND POTASSIUM CHLORIDE 50; 1.49; 4.5 G/1000ML; G/1000ML; G/1000ML
84 INJECTION, SOLUTION INTRAVENOUS CONTINUOUS
Status: DISCONTINUED | OUTPATIENT
Start: 2024-10-31 | End: 2024-11-02

## 2024-10-31 RX ORDER — ACETAMINOPHEN 325 MG/1
975 TABLET ORAL EVERY 8 HOURS SCHEDULED
Status: DISCONTINUED | OUTPATIENT
Start: 2024-10-31 | End: 2024-11-03 | Stop reason: HOSPADM

## 2024-10-31 RX ORDER — AMLODIPINE BESYLATE 5 MG/1
5 TABLET ORAL DAILY
Status: DISCONTINUED | OUTPATIENT
Start: 2024-10-31 | End: 2024-11-03 | Stop reason: HOSPADM

## 2024-10-31 RX ORDER — MAGNESIUM SULFATE HEPTAHYDRATE 40 MG/ML
2 INJECTION, SOLUTION INTRAVENOUS ONCE
Status: COMPLETED | OUTPATIENT
Start: 2024-10-31 | End: 2024-10-31

## 2024-10-31 RX ORDER — METHOCARBAMOL 500 MG/1
500 TABLET, FILM COATED ORAL EVERY 8 HOURS SCHEDULED
Status: DISCONTINUED | OUTPATIENT
Start: 2024-10-31 | End: 2024-11-03 | Stop reason: HOSPADM

## 2024-10-31 RX ORDER — GABAPENTIN 100 MG/1
100 CAPSULE ORAL 3 TIMES DAILY
Status: DISCONTINUED | OUTPATIENT
Start: 2024-10-31 | End: 2024-11-01

## 2024-10-31 RX ORDER — ESTRADIOL 0.1 MG/G
0.5 CREAM VAGINAL 2 TIMES WEEKLY
Status: DISCONTINUED | OUTPATIENT
Start: 2024-10-31 | End: 2024-11-03 | Stop reason: HOSPADM

## 2024-10-31 RX ADMIN — ACETAMINOPHEN 1000 MG: 10 INJECTION INTRAVENOUS at 05:03

## 2024-10-31 RX ADMIN — AMLODIPINE BESYLATE 5 MG: 5 TABLET ORAL at 15:09

## 2024-10-31 RX ADMIN — HEPARIN SODIUM 5000 UNITS: 5000 INJECTION INTRAVENOUS; SUBCUTANEOUS at 05:03

## 2024-10-31 RX ADMIN — METHOCARBAMOL 500 MG: 500 TABLET ORAL at 15:09

## 2024-10-31 RX ADMIN — GABAPENTIN 100 MG: 100 CAPSULE ORAL at 21:26

## 2024-10-31 RX ADMIN — MAGNESIUM SULFATE HEPTAHYDRATE 2 G: 40 INJECTION, SOLUTION INTRAVENOUS at 09:54

## 2024-10-31 RX ADMIN — GABAPENTIN 100 MG: 100 CAPSULE ORAL at 15:09

## 2024-10-31 RX ADMIN — ACETAMINOPHEN 975 MG: 325 TABLET, FILM COATED ORAL at 10:01

## 2024-10-31 RX ADMIN — ACETAMINOPHEN 975 MG: 325 TABLET, FILM COATED ORAL at 21:26

## 2024-10-31 RX ADMIN — HEPARIN SODIUM 5000 UNITS: 5000 INJECTION INTRAVENOUS; SUBCUTANEOUS at 15:10

## 2024-10-31 RX ADMIN — HEPARIN SODIUM 5000 UNITS: 5000 INJECTION INTRAVENOUS; SUBCUTANEOUS at 21:26

## 2024-10-31 RX ADMIN — ACETAMINOPHEN 975 MG: 325 TABLET, FILM COATED ORAL at 15:09

## 2024-10-31 RX ADMIN — PANTOPRAZOLE SODIUM 40 MG: 40 INJECTION, POWDER, FOR SOLUTION INTRAVENOUS at 09:49

## 2024-10-31 RX ADMIN — METHOCARBAMOL 500 MG: 500 TABLET ORAL at 21:26

## 2024-10-31 RX ADMIN — METHOCARBAMOL 500 MG: 500 TABLET ORAL at 10:01

## 2024-10-31 RX ADMIN — SODIUM CHLORIDE, SODIUM LACTATE, POTASSIUM CHLORIDE, AND CALCIUM CHLORIDE 125 ML/HR: .6; .31; .03; .02 INJECTION, SOLUTION INTRAVENOUS at 01:23

## 2024-10-31 RX ADMIN — GABAPENTIN 100 MG: 100 CAPSULE ORAL at 10:01

## 2024-10-31 RX ADMIN — DEXTROSE, SODIUM CHLORIDE, AND POTASSIUM CHLORIDE 84 ML/HR: 5; .45; .15 INJECTION INTRAVENOUS at 09:47

## 2024-10-31 RX ADMIN — PANTOPRAZOLE SODIUM 40 MG: 40 INJECTION, POWDER, FOR SOLUTION INTRAVENOUS at 21:26

## 2024-10-31 RX ADMIN — DEXTROSE, SODIUM CHLORIDE, AND POTASSIUM CHLORIDE 84 ML/HR: 5; .45; .15 INJECTION INTRAVENOUS at 21:37

## 2024-10-31 NOTE — ASSESSMENT & PLAN NOTE
- Patient is s/p partial gastrectomy on 10/30.  Current pain regimen below    - Continue acetaminophen 975 mg q8h james   - Increase Dilaudid to 0.3mg PCA dose

## 2024-10-31 NOTE — PLAN OF CARE
Problem: PHYSICAL THERAPY ADULT  Goal: Performs mobility at highest level of function for planned discharge setting.  See evaluation for individualized goals.  Description: Treatment/Interventions: Functional transfer training, LE strengthening/ROM, Elevations, Therapeutic exercise, Endurance training, Patient/family training, Equipment eval/education, Bed mobility, Gait training, Spoke to nursing, Spoke to case management, OT  Equipment Recommended: Walker       See flowsheet documentation for full assessment, interventions and recommendations.  10/31/2024 1601 by Karey Crane PT  Note: Prognosis: Good  Problem List: Decreased endurance, Impaired balance, Decreased mobility, Pain  Assessment: Pt is a 59 y.o. female seen for PT evaluation s/p admit to [facility] on 10/30/2024. Pt was admitted with a primary dx of: gastric mass s/p partial gastrectomy, lysis of adhesions.  PT now consulted for assessment of mobility and d/c needs. Pt with Up as tolerated orders. Pt  has a past medical history of Hypertension and Rheumatoid arthritis (HCC). Pts current clinical presentation is Unstable/ Unpredictable (high complexity) due to Ongoing medical management for primary dx, Increased reliance on more restrictive AD compared to baseline, Decreased activity tolerance compared to baseline, Fall risk, Increased assistance needed from caregiver at current time, Increased O2 via NC from pts baseline, Trending lab values, Continuous pulse oximetry monitoring , s/p surgical intervention. Prior to admission, pt was residing with family in 2  with 1 vs 2 MADHURI and was independent. Upon evaluation, pt currently is requiring min A for bed mobility; min A for transfers; min A for ambulation w/ RW. Pt presents at PT eval functioning below baseline and currently w/ overall mobility deficits 2* to: impaired balance, decreased endurance, gait deviations, pain, decreased activity tolerance compared to baseline, decreased  functional mobility tolerance compared to baseline, fall risk. Pt currently at a fall risk 2* to impairments listed above.  Pt will continue to benefit from skilled acute PT interventions to address stated impairments; to maximize functional mobility; for ongoing pt/ family training; and DME needs. At conclusion of PT session pt returned back in chair and chair alarm engaged with phone and call bell within reach. Pt denies any further questions at this time. The patient's AM-PAC Basic Mobility Inpatient Short Form Raw Score is 17. A Raw score of greater than 16 suggests the patient may benefit from discharge to home. Please also refer to the recommendation of the Physical Therapist for safe discharge planning. PT to continue to follow pt t/o hospital stay, recommend home with family support upon hospital D/C.        Rehab Resource Intensity Level, PT: No post-acute rehabilitation needs (anticipate progression pending medical optimization)    See flowsheet documentation for full assessment.

## 2024-10-31 NOTE — UTILIZATION REVIEW
Initial Clinical Review    Elective IP surgical procedure  Age/Sex: 59 y.o. female  Surgery Date: 10/30/2024  Procedure: PARTIAL GASTRECTOMY LYSIS OF ADHESIONS   Anesthesia: General  Operative Findings: Submucosal mass in the cardia of the stomach. No evidence of metastatic disease.     POD#1 Progress Note: no acute events overnight. Pain controlled today. Denies N/V. Abdomen soft, incision c/d/I.  DC NG tube and start clear liquid diet. Continue dPCA. IVFs. Continue prn analgesics, antiemetics. Encourage OOB/ambulate.       Admission Orders: Date/Time/Statement:   Admission Orders (From admission, onward)       Ordered        10/30/24 1542  Inpatient Admission  Once                          Orders Placed This Encounter   Procedures    Inpatient Admission     Standing Status:   Standing     Number of Occurrences:   1     Order Specific Question:   Level of Care     Answer:   Med Surg [16]     Order Specific Question:   Estimated length of stay     Answer:   More than 2 Midnights     Order Specific Question:   Certification     Answer:   I certify that inpatient services are medically necessary for this patient for a duration of greater than two midnights. See H&P and MD Progress Notes for additional information about the patient's course of treatment.     Diet: starting clear liquid diet  Mobility: OOB/ambulate  DVT Prophylaxis: SCDs, hep sq    Medications/Pain Control:   Scheduled Medications:  acetaminophen, 975 mg, Oral, Q8H SHELIA  amLODIPine, 5 mg, Oral, Daily  estradiol, 0.5 g, Vaginal, Once per day on Monday Thursday  gabapentin, 100 mg, Oral, TID  heparin (porcine), 5,000 Units, Subcutaneous, Q8H SHELIA  magnesium sulfate, 2 g, Intravenous, Once  methocarbamol, 500 mg, Oral, Q8H SHELIA  pantoprazole, 40 mg, Intravenous, Q12H SHELIA    Continuous IV Infusions:  dextrose 5 % and sodium chloride 0.45 % with KCl 20 mEq/L, 84 mL/hr, Intravenous, Continuous  HYDROmorphone, , Intravenous, Continuous    PRN Meds:  labetalol, 10  mg, Intravenous, Q4H PRN  lactated ringers, 1,000 mL, Intravenous, Once PRN  naloxone, 0.04 mg, Intravenous, Q1MIN PRN  ondansetron, 4 mg, Intravenous, Q4H PRN  saliva substitute, 5 spray, Mouth/Throat, 4x Daily PRN  sodium chloride, 1,000 mL, Intravenous, Once PRN      Vital Signs (last 3 days)       Date/Time Temp Pulse Resp BP MAP (mmHg) SpO2 Calculated FIO2 (%) - Nasal Cannula Nasal Cannula O2 Flow Rate (L/min) O2 Device Patient Position - Orthostatic VS Pain    10/31/24 1001 -- -- -- -- -- -- -- -- -- -- 9    10/31/24 09:36:15 -- -- -- 124/80 95 -- -- -- -- -- --    10/31/24 07:36:16 98 °F (36.7 °C) -- 15 120/78 92 -- -- -- -- -- --    10/31/24 05:09:42 97.9 °F (36.6 °C) 96 -- 129/83 98 96 % -- -- -- -- --    10/31/24 02:33:26 97.6 °F (36.4 °C) 102 18 128/82 97 96 % -- -- -- -- --    10/31/24 00:02:24 97.4 °F (36.3 °C) 104 -- 128/82 97 96 % -- -- -- -- --    10/30/24 22:35:20 -- -- -- 134/85 101 -- -- -- -- -- --    10/30/24 2113 -- -- 18 -- -- -- 36 4 L/min Nasal cannula -- 5    10/30/24 1900 -- 109 15 161/71 95 94 % 36 4 L/min Nasal cannula Lying --    10/30/24 1830 -- 109 14 126/66 90 95 % 36 4 L/min Nasal cannula Lying --    10/30/24 1800 -- 107 11 140/74 101 95 % 36 4 L/min Nasal cannula Lying --    10/30/24 1748 -- 112 18 135/74 97 95 % -- -- -- -- 9    10/30/24 1734 -- -- -- -- -- -- -- -- -- -- No Pain    10/30/24 1730 -- 107 15 146/82 106 97 % 36 4 L/min Nasal cannula Lying --    10/30/24 1700 -- 107 15 142/73 102 96 % 36 4 L/min Simple mask Lying --    10/30/24 1645 -- 103 14 132/70 98 96 % 36 4 L/min Simple mask Lying --    10/30/24 1635 -- 100 13 159/77 109 97 % 36 4 L/min Simple mask Lying 10 - Worst Possible Pain    10/30/24 1630 -- 101 12 159/77 109 98 % 36 4 L/min Simple mask Lying --    10/30/24 1615 -- 101 24 134/68 95 92 % 36 4 L/min Simple mask Lying --    10/30/24 1609 -- -- -- -- -- -- -- -- -- -- 10 - Worst Possible Pain    10/30/24 1608 -- -- -- -- -- -- -- -- -- -- 10 - Worst Possible  Pain    10/30/24 1600 -- 93 19 128/69 94 100 % 36 4 L/min Simple mask Lying --    10/30/24 1552 -- -- -- -- -- -- -- -- -- -- 10 - Worst Possible Pain    10/30/24 1545 -- 96 20 141/67 98 100 % 36 4 L/min Simple mask Lying --    10/30/24 1542 98.7 °F (37.1 °C) 92 -- 149/73 103 100 % 44 6 L/min Simple mask Lying --    10/30/24 0924 97.5 °F (36.4 °C) 79 18 103/79 -- 98 % -- -- None (Room air) -- No Pain          Weight (last 2 days)       Date/Time Weight    10/30/24 2113 80.7 (178)    10/30/24 0924 80.7 (178)            Pertinent Labs/Diagnostic Test Results:     Results from last 7 days   Lab Units 10/31/24  0527   WBC Thousand/uL 13.61*   HEMOGLOBIN g/dL 13.8   HEMATOCRIT % 42.3   PLATELETS Thousands/uL 245   TOTAL NEUT ABS Thousands/µL 12.27*     Results from last 7 days   Lab Units 10/31/24  0527   SODIUM mmol/L 135   POTASSIUM mmol/L 4.5   CHLORIDE mmol/L 98   CO2 mmol/L 31   ANION GAP mmol/L 6   BUN mg/dL 11   CREATININE mg/dL 0.47*   EGFR ml/min/1.73sq m 106   CALCIUM mg/dL 8.9   MAGNESIUM mg/dL 1.8*   PHOSPHORUS mg/dL 3.8     Results from last 7 days   Lab Units 10/31/24  0527   GLUCOSE RANDOM mg/dL 134         Network Utilization Review Department  ATTENTION: Please call with any questions or concerns to [phone] and carefully listen to the prompts so that you are directed to the right person. All voicemails are confidential.   For Discharge needs, contact Care Management DC Support Team at [phone] opt. [phone]  Send all requests for admission clinical reviews, approved or denied determinations and any other requests to dedicated fax number below belonging to the campus where the patient is receiving treatment. List of dedicated fax numbers for the Facilities:  FACILITY NAME UR FAX NUMBER   ADMISSION DENIALS (Administrative/Medical Necessity) [phone]   DISCHARGE SUPPORT TEAM (NETWORK) [phone]   PARENT CHILD HEALTH (Maternity/NICU/Pediatrics) [phone]   [facility]  [phone]   [facility] [phone]   [facility] [phone]   [facility] [phone]   [facility] [phone]   [facility] [phone]   [facility] [phone]   [facility] [phone]   [facility] [phone]   [facility] [phone]   [facility] [phone]   [facility] [phone]

## 2024-10-31 NOTE — PLAN OF CARE
Problem: OCCUPATIONAL THERAPY ADULT  Goal: Performs self-care activities at highest level of function for planned discharge setting.  See evaluation for individualized goals.  Description: Treatment Interventions: ADL retraining, Functional transfer training, Endurance training, Patient/family training, Equipment evaluation/education, Compensatory technique education, Continued evaluation, Energy conservation, Activityengagement          See flowsheet documentation for full assessment, interventions and recommendations.   Note: Limitation: Decreased ADL status, Decreased endurance, Decreased self-care trans, Decreased high-level ADLs     Assessment: Pt is a 59 y.o. female seen for OT evaluation s/p admission to [facility] on 10/30/2024. Pt diagnosed with Gastric mass; s/p partial gastrectomy and MARCE on 10/30. Pt has a significant PMH impacting occupational performance including: Hypertension and Rheumatoid arthritis (HCC). Pt with active OT evaluation and treatment orders and activity orders. PTA, pt living with her spouse, son, and mother in a 2 SH w/ 1 vs 2 MADHURI. Pt reports I w/ ADLs, IADLs, and fxnl mobility w/o AD at baseline; + driving. Pt agreeable and willing to participate in OT evaluation. During evaluation, pt was I for eating, S for grooming and UB ADLs, mod A for LB ADLs, and min A for toileting. Pt also required min Ax1 for bed mobility, transfers, and fxnl mobility w/ RW for support. Performance deficits that affect the pt’s occupational performance during the initial evaluation include decreased ADL status, decreased activity tolerance, decreased endurance, decreased sitting tolerance, decreased sitting balance, decreased standing tolerance, decreased standing balance, decreased transfer skills, decreased fxnl mobility, and pain. Based on pt’s functional performance and deficits the following occupations will be addressed in OT treatments in order to maximize pt’s independence and overall  occupational performance: grooming, bathing/showering, toileting and toilet hygiene, dressing, and functional mobility. Goals are listed below.  From OT perspective, recommend home w/ family support upon d/c when pt medically stable to d/c from acute care. Will continue to follow.     Rehab Resource Intensity Level, OT: No post-acute rehabilitation needs

## 2024-10-31 NOTE — QUICK NOTE
Surgery Post-Op Check  Nidhi Jeffries 59 y.o. female MRN: [id number]  Unit/Bed#: [facility] 921-01 Encounter: [id number]     S: Patient seen and examined bedside with family present. Pain controlled and using dPCA appropriately. Denies nausea or emesis. NG in place with scant sang drainage in tubing. NG to LCWS.   Epidural was noted to be removed. Back examined with no dressing or drainage noted. Sacral meplex pressure dressing in place. Per report, epidural removed in PACU per Anesthesia d/t concerns for nonfunctioning. dPCA currently providing appropriate analgesia.    O:   Vitals:    10/30/24 1900   BP: 161/71   Pulse: (!) 109   Resp: 15   Temp:    SpO2: 94%     I/O         10/29 0701  10/30 0700 10/30 0701  10/31 0700    I.V. (mL/kg)  2000 (24.8)    IV Piggyback  350    Total Intake(mL/kg)  2350 (29.1)    Urine (mL/kg/hr)  700    Blood  200    Total Output  900    Net  +1450                PE:  Gen:  No acute distress  CV:  Warm, well-perfused  Lung:  Normal work of breathing, no respiratory distress  Abd:  Soft, appropriately tender, distended, NG w/sang in tubing, incisions c/d/i  Ext:  Moving all extremities  Neuro:  Alert and oriented, M/S grossly intact     Lab Results   Component Value Date    WBC 3.85 (L) 10/16/2024    HGB 14.7 10/16/2024    HCT 45.6 10/16/2024    MCV 90 10/16/2024     10/16/2024     Lab Results   Component Value Date    GLUCOSE 95 02/24/2014    CALCIUM 9.5 10/16/2024     02/24/2014    K 4.4 10/16/2024    CO2 31 10/16/2024     10/16/2024    BUN 12 10/16/2024    CREATININE 0.57 (L) 10/16/2024         A/P: 59 y.o. female Day of Surgery s/p Procedure(s) (LRB):  PARTIAL GASTRECTOMY LYSIS OF ADHESIONS (N/A)    Plan:  NPO NGT    dPCA  DVT ppx  Out of bed, encourage ambulation  Encourage incentive spirometer use  Strict I's and O's  Pain and nausea control p.r.n.  Please tiger text on call white team surgery resident for questions or concerns      Cherry Barth MD  PGYIV, General  Surgery

## 2024-10-31 NOTE — CONSULTS
Consultation - Acute Pain   Name: Nidhi Jeffries 59 y.o. female I MRN: [id number]  [facility] I Date of Admission: 10/30/2024   Date of Service: 10/31/2024 I Hospital Day: 1   Inpatient consult to Acute Pain Service  Consult performed by: Luiz Minor DO  Consult ordered by: Jaiden Montez MD        Physician Requesting Evaluation: Dixon Valencia MD   Reason for Evaluation / Principal Problem: post-op pain control s/p partial gastrectomy     Assessment & Plan  Gastric mass  - Patient is s/p partial gastrectomy on 10/30.  Current pain regimen below    - Continue acetaminophen 975 mg q8h james   - Increase Dilaudid to 0.3mg PCA dose       APS will continue to follow. Please contact Acute Pain Service - via SecureChat from [phone] with additional questions or concerns. See SecureChat or Amion for additional contacts and after hours information.     History of Present Illness    HPI: Nidhi Jeffries is a 59 y.o. year old female who presents with abdominal pain in July 2024. CT from July 10, 2024 revealed a 3 x 4.5 x 3.2 cm along the lesser curve near the GE junction. She then underwent endoscopic ultrasound on September 13, 2024. This revealed a submucosal mass just distal to the GE junction seen on retroflexion. Patient is s/p partial gastrectomy on 10/30. Patient had a epidural placed which was removed in PACU due to concerns for nonfunctioning. Patient was started on PCEA pump.     Current pain location(s): Pain Score: 5  Pain Location/Orientation: Location: Incision  Pain Scale: Pain Assessment Tool: 0-10  Current Analgesic regimen:    Current pain regimen below    - Continue acetaminophen 975 mg q8h james   - Increase Dilaudid to 0.3mg PCA dose       Pain History: She reports she continue to have pain around the incision site and rates the pain 5/10 and states the PCEA pump helps but would like to try a slightly higher dose.       Review of Systems  I have reviewed the patient's PMH, PSH, Social History,  Family History, Meds, and Allergies    Objective :  Temp:  [97.4 °F (36.3 °C)-98.7 °F (37.1 °C)] 98 °F (36.7 °C)  HR:  [] 96  BP: (120-161)/(66-85) 124/80  Resp:  [11-24] 15  SpO2:  [92 %-100 %] 96 %  O2 Device: Nasal cannula  Nasal Cannula O2 Flow Rate (L/min):  [4 L/min-6 L/min] 4 L/min    Physical Exam   Eyes:      Extraocular Movements: Extraocular movements intact.   Skin:     General: Skin is warm and dry.   HENT:      Head: Normocephalic and atraumatic.   Cardiovascular:      Rate and Rhythm: Normal rate and regular rhythm.      Pulses: Normal pulses.   Musculoskeletal:      Right lower leg: No edema.      Left lower leg: No edema.   Abdominal:      Palpations: Abdomen is soft with some tenderness  Constitutional:       General: She is not in acute distress.     Interventions: She is intubated.   Pulmonary:      Effort: Pulmonary effort is normal.   Neurological:      Mental Status: She is alert.       Lab Results: I have reviewed the following results:  Estimated Creatinine Clearance: 123.6 mL/min (A) (by C-G formula based on SCr of 0.47 mg/dL (L)).  Lab Results   Component Value Date    WBC 13.61 (H) 10/31/2024    WBC 6.12 03/15/2014    HGB 13.8 10/31/2024    HGB 14.1 03/15/2014    HCT 42.3 10/31/2024    HCT 42.5 03/15/2014     10/31/2024     03/15/2014         Component Value Date/Time     02/24/2014 1010    K 4.5 10/31/2024 0527    K 3.2 (L) 02/24/2014 1010    CL 98 10/31/2024 0527     02/24/2014 1010    CO2 31 10/31/2024 0527    CO2 29 02/24/2014 1010    BUN 11 10/31/2024 0527    BUN 13 02/24/2014 1010    CREATININE 0.47 (L) 10/31/2024 0527    CREATININE 0.67 02/24/2014 1010         Component Value Date/Time    CALCIUM 8.9 10/31/2024 0527    CALCIUM 8.9 02/24/2014 1010    ALKPHOS 68 10/16/2024 0920    ALKPHOS 81 02/24/2014 1010    AST 19 10/16/2024 0920    AST 24 02/24/2014 1010    ALT 20 10/16/2024 0920    ALT 35 02/24/2014 1010    BILITOT 0.81 02/24/2014 1010    TP 6.9  10/16/2024 0920    ALB 4.3 10/16/2024 0920    ALB 3.9 02/24/2014 1010

## 2024-10-31 NOTE — OCCUPATIONAL THERAPY NOTE
Occupational Therapy Evaluation     Patient Name: Nidhi Jeffries  Today's Date: 10/31/2024  Problem List  Principal Problem:    Gastric mass    Past Medical History  Past Medical History:   Diagnosis Date    Hypertension     Rheumatoid arthritis (HCC)      Past Surgical History  Past Surgical History:   Procedure Laterality Date     SECTION      LAPAROTOMY N/A 7/10/2024    Procedure: LAPAROTOMY EXPLORATORY, MODIFIED KYRIE PATCH;  Surgeon: Luis Alberto Lopez DO;  Location: [facility];  Service: General    PA GSTRCT PRTL DSTL W/GASTRODUODENOSTOMY N/A 10/30/2024    Procedure: PARTIAL GASTRECTOMY LYSIS OF ADHESIONS;  Surgeon: Dixon Valencia MD;  Location: [facility];  Service: Surgical Oncology        10/31/24 0944   Note Type   Note type Evaluation   Pain Assessment   Pain Assessment Tool 0-10   Pain Score 9   Pain Location/Orientation Location: Abdomen   Effect of Pain on Daily Activities limits activity tolerance, mobility, and I w/ ADLs   Patient's Stated Pain Goal No pain   Hospital Pain Intervention(s) Repositioned;Ambulation/increased activity;Emotional support   Restrictions/Precautions   Weight Bearing Precautions Per Order No   Other Precautions Multiple lines;Fall Risk;Pain  (PCA pump, thomas cathter)   Home Living   Type of Home House   Home Layout Two level;Bed/bath upstairs;Stairs to enter with rails  (1 vs 2 MADHURI)   Bathroom Shower/Tub Tub/shower unit   Bathroom Toilet Standard   Bathroom Equipment   (pt denies)   Home Equipment   (pt denies)   Prior Function   Level of [place] Independent with ADLs;Independent with functional mobility;Independent with IADLS   Lives With Spouse;Son  (+ mother)   Receives Help From Family   IADLs Independent with driving;Independent with meal prep;Independent with medication management   Falls in the last 6 months 0   Vocational Full time employment   Lifestyle   Autonomy Pt reports I w/ ADLs, IADLs, and fxnl mobility w/o AD; + driving.   Reciprocal  Relationships Pt lives w/ her spouse, son, and mother.   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   Intrinsic Gratification Pt enjoys making bracelets.   General   Family/Caregiver Present No   ADL   Where Assessed Edge of bed   Eating Assistance 7  Independent   Grooming Assistance 5  Supervision/Setup   UB Bathing Assistance 5  Supervision/Setup   LB Bathing Assistance 3  Moderate Assistance   UB Dressing Assistance 5  Supervision/Setup   LB Dressing Assistance 3  Moderate Assistance   Toileting Assistance  4  Minimal Assistance   Bed Mobility   Supine to Sit 4  Minimal assistance   Additional items Assist x 1;HOB elevated;Bedrails;Increased time required;Verbal cues   Sit to Supine Unable to assess   Additional Comments Pt seated OOB in chair at end of OT evaluation w/ all needs within reach.  (Pt c/o dizziness upon sitting EOB; BP was 124/80.)   Transfers   Sit to Stand 4  Minimal assistance   Additional items Assist x 1;Increased time required;Verbal cues   Stand to Sit 4  Minimal assistance   Additional items Assist x 1;Increased time required;Verbal cues   Additional Comments w/ RW for support   Functional Mobility   Functional Mobility 4  Minimal assistance   Additional Comments Pt took few steps from EOB > chair w/ min Ax1 using RW for support.   Additional items Rolling walker   Balance   Static Sitting Fair +   Dynamic Sitting Fair   Static Standing Fair -   Dynamic Standing Poor +   Ambulatory Poor +   Activity Tolerance   Activity Tolerance Patient limited by pain;Patient limited by fatigue   Medical Staff Made Aware PTKarey, due to pt's medical complexity and multiple comorbidities   Nurse Made Aware RN clearance prior to session   RUE Assessment   RUE Assessment WFL   LUE Assessment   LUE Assessment WFL   Hand Function   Gross Motor Coordination Functional   Fine Motor Coordination Functional   Cognition   Overall Cognitive Status WFL   Arousal/Participation  Alert;Responsive;Cooperative   Attention Within functional limits   Orientation Level Oriented X4   Memory Within functional limits   Following Commands Follows one step commands without difficulty   Comments Pt was identified by name and . Pt was pleasant, cooperative, and willing to participate in OT evaluation. Pt w/ flat affect.   Assessment   Limitation Decreased ADL status;Decreased endurance;Decreased self-care trans;Decreased high-level ADLs   Assessment Pt is a 59 y.o. female seen for OT evaluation s/p admission to [facility] on 10/30/2024. Pt diagnosed with Gastric mass; s/p partial gastrectomy and MARCE on 10/30. Pt has a significant PMH impacting occupational performance including: Hypertension and Rheumatoid arthritis (HCC). Pt with active OT evaluation and treatment orders and activity orders. PTA, pt living with her spouse, son, and mother in a 2 SH w/ 1 vs 2 MADHURI. Pt reports I w/ ADLs, IADLs, and fxnl mobility w/o AD at baseline; + driving. Pt agreeable and willing to participate in OT evaluation. During evaluation, pt was I for eating, S for grooming and UB ADLs, mod A for LB ADLs, and min A for toileting. Pt also required min Ax1 for bed mobility, transfers, and fxnl mobility w/ RW for support. Performance deficits that affect the pt’s occupational performance during the initial evaluation include decreased ADL status, decreased activity tolerance, decreased endurance, decreased sitting tolerance, decreased sitting balance, decreased standing tolerance, decreased standing balance, decreased transfer skills, decreased fxnl mobility, and pain. Based on pt’s functional performance and deficits the following occupations will be addressed in OT treatments in order to maximize pt’s independence and overall occupational performance: grooming, bathing/showering, toileting and toilet hygiene, dressing, and functional mobility. Goals are listed below.  From OT perspective, recommend home w/ family  support upon d/c when pt medically stable to d/c from acute care. Will continue to follow.   Goals   Patient Goals to feel better   LTG Time Frame 10-14   Plan   Treatment Interventions ADL retraining;Functional transfer training;Endurance training;Patient/family training;Equipment evaluation/education;Compensatory technique education;Continued evaluation;Energy conservation;Activityengagement   Goal Expiration Date 11/14/24   OT Treatment Day 0   OT Frequency 2-3x/wk   Discharge Recommendation   Rehab Resource Intensity Level, OT No post-acute rehabilitation needs   AM-PAC Daily Activity Inpatient   Lower Body Dressing 2   Bathing 3   Toileting 3   Upper Body Dressing 3   Grooming 3   Eating 4   Daily Activity Raw Score 18   Daily Activity Standardized Score (Calc for Raw Score >=11) 38.66   AM-PAC Applied Cognition Inpatient   Following a Speech/Presentation 4   Understanding Ordinary Conversation 4   Taking Medications 4   Remembering Where Things Are Placed or Put Away 4   Remembering List of 4-5 Errands 4   Taking Care of Complicated Tasks 4   Applied Cognition Raw Score 24   Applied Cognition Standardized Score 62.21       The patient's raw score on the AM-PAC Daily Activity Inpatient Short Form is 18. A raw score of less than 19 suggests the patient may benefit from discharge to post-acute rehabilitation services. Please refer to the recommendation of the Occupational Therapist for safe discharge planning.    Goals:      - Pt will complete UB ADLs w/ mod I to maximize independence and return home.    - Pt will complete LB ADLs w/ mod I to maximize independence and return home.     - Pt will complete toileting routine (transfers, hygiene, and clothing management) w/ mod I to maximize independence and return to prior level of function.    - Pt will complete bed mobility supine >< sit w/ mod I to maximize independence and return home.    - Pt will transfer to bed, chair, and toilet w/ mod I using AD / DME as  needed to maximize independence and reduce burden of care.     - Pt will ambulate household distances w/ mod I using least restrictive device to maximize independence and return home.     - Pt will increase activity tolerance (and sitting tolerance) by eating all meals OOB in the chair.     - Pt will increase standing tolerance to 4-5 minutes to maximize independence w/ grooming tasks standing at the sink.     - Pt will tolerate therapeutic activities for greater than 30 minutes in order to increase tolerance for functional activities.       LATRICIA Messer, OTR/L

## 2024-10-31 NOTE — ASSESSMENT & PLAN NOTE
S/p partial gastrectomy, MARCE on 10/30      Plan:  NG removed bedside this morning  Start CLD  IVF  dPCA   Strict I/Os  DVT ppx  PRN pain / nausea control  OOB ambulate

## 2024-10-31 NOTE — PHYSICAL THERAPY NOTE
Physical Therapy Evaluation     Patient's Name: Nidhi Jeffries    Admitting Diagnosis  Gastric mass [K31.89]  Leiomyoma of stomach [D21.4]    Problem List  Patient Active Problem List   Diagnosis    Lumbar stenosis with neurogenic claudication    Essential hypertension    Generalized osteoarthritis    Hyperlipidemia    Lump, breast    Postmenopausal bleeding    Low libido    Intervertebral disc disorder with radiculopathy of lumbar region    Bilateral carpal tunnel syndrome    Paresthesia of hand, bilateral    Bilateral hand pain    S/P exploratory laparotomy    Perforated ulcer (HCC)    Gastric mass    Acute gastric ulcer with perforation (HCC)    Leiomyoma of stomach       Past Medical History  Past Medical History:   Diagnosis Date    Hypertension     Rheumatoid arthritis (HCC)        Past Surgical History  Past Surgical History:   Procedure Laterality Date     SECTION      LAPAROTOMY N/A 7/10/2024    Procedure: LAPAROTOMY EXPLORATORY, MODIFIED KYRIE PATCH;  Surgeon: Luis Alberto Lopez DO;  Location: [facility];  Service: General    AR GSTRCT PRTL DSTL W/GASTRODUODENOSTOMY N/A 10/30/2024    Procedure: PARTIAL GASTRECTOMY LYSIS OF ADHESIONS;  Surgeon: Dixon Valencia MD;  Location: [facility];  Service: Surgical Oncology        10/31/24 0945   PT Last Visit   PT Visit Date 10/31/24   Note Type   Note type Evaluation   Pain Assessment   Pain Assessment Tool 0-10   Pain Score 9   Pain Location/Orientation Location: Abdomen   Hospital Pain Intervention(s) Repositioned;Ambulation/increased activity;Emotional support   Restrictions/Precautions   Weight Bearing Precautions Per Order No   Other Precautions Multiple lines;Fall Risk;Pain;O2  (PCA pump, thomas catheter)   Home Living   Type of Home House   Home Layout Two level;Bed/bath upstairs;Stairs to enter with rails  (1 vs 2 MADHURI)   Bathroom Shower/Tub Tub/shower unit   Bathroom Toilet Standard   Bathroom Equipment   (pt denies)   Home Equipment   (pt denies)    Prior Function   Level of [place] Independent with ADLs;Independent with functional mobility;Independent with IADLS   Lives With Spouse;Son  (+ mother)   Receives Help From Family   IADLs Independent with driving;Independent with meal prep;Independent with medication management   Falls in the last 6 months 0   Vocational Full time employment   General   Family/Caregiver Present No   Cognition   Overall Cognitive Status WFL   Attention Within functional limits   Orientation Level Oriented X4   Memory Within functional limits   Following Commands Follows one step commands without difficulty   Comments flat affect, pleasant and cooperative   RLE Assessment   RLE Assessment   (functionally 3+/5)   LLE Assessment   LLE Assessment   (functionally 3+/5)   Bed Mobility   Supine to Sit 4  Minimal assistance   Additional items Assist x 1;HOB elevated;Bedrails;Increased time required   Sit to Supine Unable to assess   Additional Comments pt found supine in bed upon arrival. Pt left sitting OOB in recliner with all needs wihtin reach - alarm on post session  (Pt c/o dizziness upon sitting EOB; BP was 124/80.)   Transfers   Sit to Stand 4  Minimal assistance   Additional items Assist x 1;Increased time required;Verbal cues   Stand to Sit 4  Minimal assistance   Additional items Assist x 1;Increased time required;Verbal cues   Additional Comments RW   Ambulation/Elevation   Gait pattern Short stride;Foward flexed;Inconsistent martha   Gait Assistance 4  Minimal assist   Additional items Assist x 1;Verbal cues;Tactile cues   Assistive Device Rolling walker   Distance 4'; limited 2* c/o pain   Stair Management Assistance Not tested   Balance   Static Sitting Fair +   Dynamic Sitting Fair   Static Standing Fair -   Dynamic Standing Poor +   Ambulatory Poor +   Endurance Deficit   Endurance Deficit Yes   Endurance Deficit Description pain   Activity Tolerance   Activity Tolerance Patient limited by fatigue;Patient limited by  pain   Medical Staff Made Aware TEMI wheatley; co-session completed this date 2* increased medical complexity and multiple co-morbidities   Nurse Made Aware RN cleared   Assessment   Prognosis Good   Problem List Decreased endurance;Impaired balance;Decreased mobility;Pain   Assessment Pt is a 59 y.o. female seen for PT evaluation s/p admit to [facility] on 10/30/2024. Pt was admitted with a primary dx of: gastric mass s/p partial gastrectomy, lysis of adhesions.  PT now consulted for assessment of mobility and d/c needs. Pt with Up as tolerated orders. Pt  has a past medical history of Hypertension and Rheumatoid arthritis (HCC). Pts current clinical presentation is Unstable/ Unpredictable (high complexity) due to Ongoing medical management for primary dx, Increased reliance on more restrictive AD compared to baseline, Decreased activity tolerance compared to baseline, Fall risk, Increased assistance needed from caregiver at current time, Increased O2 via NC from pts baseline, Trending lab values, Continuous pulse oximetry monitoring , s/p surgical intervention. Prior to admission, pt was residing with family in 2  with 1 vs 2 MADHURI and was independent. Upon evaluation, pt currently is requiring min A for bed mobility; min A for transfers; min A for ambulation w/ RW. Pt presents at PT eval functioning below baseline and currently w/ overall mobility deficits 2* to: impaired balance, decreased endurance, gait deviations, pain, decreased activity tolerance compared to baseline, decreased functional mobility tolerance compared to baseline, fall risk. Pt currently at a fall risk 2* to impairments listed above.  Pt will continue to benefit from skilled acute PT interventions to address stated impairments; to maximize functional mobility; for ongoing pt/ family training; and DME needs. At conclusion of PT session pt returned back in chair and chair alarm engaged with phone and call bell within reach. Pt denies any  further questions at this time. The patient's AM-PAC Basic Mobility Inpatient Short Form Raw Score is 17. A Raw score of greater than 16 suggests the patient may benefit from discharge to home. Please also refer to the recommendation of the Physical Therapist for safe discharge planning. PT to continue to follow pt t/o hospital stay, recommend home with family support upon hospital D/C.   Goals   Patient Goals to feel better   STG Expiration Date 11/14/24   Short Term Goal #1 STG 1. Pt will be able to perform bed mobility tasks with mod I level in order to improve overall functional mobility and assist in safe d/c. STG 2. Pt with sit EOB for at least 25 minutes at mod I level in order to strengthen abdominal musculature and assist in future transfers/ ambulation. STG 3. Pt will be able to perform functional transfer with mod I level in order to improve overall functional mobility and assist in safe d/c. STG 4. Pt will be able to ambulate at least 250 feet with least restrictive device with mod I level A in order to improve overall functional mobility and assist in safe d/c. STG 5. Pt will improve sitting/standing static/dynamic balance 1/2 grade in order to improve functional mobility and assist in safe d/c. STG 6. Pt will improve LE strength by 1/2 grade in order to improve functional mobility and assist in safe d/c. STG 7. Pt will be able to negotiate at least 7 stairs with least restrictive device with S level A in order to improve overall functional mobility and assist in safe d/c.   PT Treatment Day 0   Plan   Treatment/Interventions Functional transfer training;LE strengthening/ROM;Elevations;Therapeutic exercise;Endurance training;Patient/family training;Equipment eval/education;Bed mobility;Gait training;Spoke to nursing;Spoke to case management;OT   PT Frequency 3-5x/wk   Discharge Recommendation   Rehab Resource Intensity Level, PT No post-acute rehabilitation needs  (anticipate progression pending medical  optimization)   Equipment Recommended Walker   Walker Package Recommended Wheeled walker   AM-PAC Basic Mobility Inpatient   Turning in Flat Bed Without Bedrails 3   Lying on Back to Sitting on Edge of Flat Bed Without Bedrails 3   Moving Bed to Chair 3   Standing Up From Chair Using Arms 3   Walk in Room 3   Climb 3-5 Stairs With Railing 2   Basic Mobility Inpatient Raw Score 17   Basic Mobility Standardized Score 39.67   [facility] Highest Level Of Mobility   -HLM Goal 5: Stand one or more mins   -HLM Achieved 4: Move to chair/commode   Modified [place] Scale   Modified [place] Scale 4           Karey Crane, PT DPT

## 2024-11-01 LAB
BASOPHILS # BLD AUTO: 0.03 THOUSANDS/ΜL (ref 0–0.1)
BASOPHILS NFR BLD AUTO: 0 % (ref 0–1)
EOSINOPHIL # BLD AUTO: 0.07 THOUSAND/ΜL (ref 0–0.61)
EOSINOPHIL NFR BLD AUTO: 1 % (ref 0–6)
ERYTHROCYTE [DISTWIDTH] IN BLOOD BY AUTOMATED COUNT: 13.4 % (ref 11.6–15.1)
HCT VFR BLD AUTO: 39.4 % (ref 34.8–46.1)
HGB BLD-MCNC: 12.6 G/DL (ref 11.5–15.4)
IMM GRANULOCYTES # BLD AUTO: 0.05 THOUSAND/UL (ref 0–0.2)
IMM GRANULOCYTES NFR BLD AUTO: 1 % (ref 0–2)
LYMPHOCYTES # BLD AUTO: 1.23 THOUSANDS/ΜL (ref 0.6–4.47)
LYMPHOCYTES NFR BLD AUTO: 12 % (ref 14–44)
MAGNESIUM SERPL-MCNC: 1.9 MG/DL (ref 1.9–2.7)
MCH RBC QN AUTO: 29.1 PG (ref 26.8–34.3)
MCHC RBC AUTO-ENTMCNC: 32 G/DL (ref 31.4–37.4)
MCV RBC AUTO: 91 FL (ref 82–98)
MONOCYTES # BLD AUTO: 0.75 THOUSAND/ΜL (ref 0.17–1.22)
MONOCYTES NFR BLD AUTO: 7 % (ref 4–12)
NEUTROPHILS # BLD AUTO: 8.07 THOUSANDS/ΜL (ref 1.85–7.62)
NEUTS SEG NFR BLD AUTO: 79 % (ref 43–75)
NRBC BLD AUTO-RTO: 0 /100 WBCS
PLATELET # BLD AUTO: 209 THOUSANDS/UL (ref 149–390)
PMV BLD AUTO: 10.7 FL (ref 8.9–12.7)
RBC # BLD AUTO: 4.33 MILLION/UL (ref 3.81–5.12)
WBC # BLD AUTO: 10.2 THOUSAND/UL (ref 4.31–10.16)

## 2024-11-01 PROCEDURE — 99233 SBSQ HOSP IP/OBS HIGH 50: CPT | Performed by: PHYSICIAN ASSISTANT

## 2024-11-01 PROCEDURE — 99255 IP/OBS CONSLTJ NEW/EST HI 80: CPT | Performed by: ANESTHESIOLOGY

## 2024-11-01 PROCEDURE — 99024 POSTOP FOLLOW-UP VISIT: CPT | Performed by: SURGERY

## 2024-11-01 PROCEDURE — 83735 ASSAY OF MAGNESIUM: CPT | Performed by: PHYSICIAN ASSISTANT

## 2024-11-01 PROCEDURE — 85025 COMPLETE CBC W/AUTO DIFF WBC: CPT | Performed by: PHYSICIAN ASSISTANT

## 2024-11-01 RX ORDER — MAGNESIUM SULFATE 1 G/100ML
1 INJECTION INTRAVENOUS ONCE
Status: COMPLETED | OUTPATIENT
Start: 2024-11-01 | End: 2024-11-01

## 2024-11-01 RX ORDER — SCOLOPAMINE TRANSDERMAL SYSTEM 1 MG/1
1 PATCH, EXTENDED RELEASE TRANSDERMAL
Status: DISCONTINUED | OUTPATIENT
Start: 2024-11-01 | End: 2024-11-03 | Stop reason: HOSPADM

## 2024-11-01 RX ORDER — GABAPENTIN 300 MG/1
300 CAPSULE ORAL
Status: DISCONTINUED | OUTPATIENT
Start: 2024-11-01 | End: 2024-11-03 | Stop reason: HOSPADM

## 2024-11-01 RX ADMIN — METHOCARBAMOL 500 MG: 500 TABLET ORAL at 05:19

## 2024-11-01 RX ADMIN — HEPARIN SODIUM 5000 UNITS: 5000 INJECTION INTRAVENOUS; SUBCUTANEOUS at 22:16

## 2024-11-01 RX ADMIN — ACETAMINOPHEN 975 MG: 325 TABLET, FILM COATED ORAL at 13:56

## 2024-11-01 RX ADMIN — DEXTROSE, SODIUM CHLORIDE, AND POTASSIUM CHLORIDE 84 ML/HR: 5; .45; .15 INJECTION INTRAVENOUS at 21:24

## 2024-11-01 RX ADMIN — ACETAMINOPHEN 975 MG: 325 TABLET, FILM COATED ORAL at 05:19

## 2024-11-01 RX ADMIN — AMLODIPINE BESYLATE 5 MG: 5 TABLET ORAL at 08:57

## 2024-11-01 RX ADMIN — DEXTROSE, SODIUM CHLORIDE, AND POTASSIUM CHLORIDE 84 ML/HR: 5; .45; .15 INJECTION INTRAVENOUS at 09:58

## 2024-11-01 RX ADMIN — ACETAMINOPHEN 975 MG: 325 TABLET, FILM COATED ORAL at 22:15

## 2024-11-01 RX ADMIN — METHOCARBAMOL 500 MG: 500 TABLET ORAL at 13:56

## 2024-11-01 RX ADMIN — PANTOPRAZOLE SODIUM 40 MG: 40 INJECTION, POWDER, FOR SOLUTION INTRAVENOUS at 22:16

## 2024-11-01 RX ADMIN — HEPARIN SODIUM 5000 UNITS: 5000 INJECTION INTRAVENOUS; SUBCUTANEOUS at 13:57

## 2024-11-01 RX ADMIN — PANTOPRAZOLE SODIUM 40 MG: 40 INJECTION, POWDER, FOR SOLUTION INTRAVENOUS at 07:50

## 2024-11-01 RX ADMIN — MAGNESIUM SULFATE HEPTAHYDRATE 1 G: 1 INJECTION, SOLUTION INTRAVENOUS at 12:12

## 2024-11-01 RX ADMIN — GABAPENTIN 300 MG: 300 CAPSULE ORAL at 22:15

## 2024-11-01 RX ADMIN — SCOPOLAMINE 1 PATCH: 1.5 PATCH, EXTENDED RELEASE TRANSDERMAL at 12:05

## 2024-11-01 RX ADMIN — HEPARIN SODIUM 5000 UNITS: 5000 INJECTION INTRAVENOUS; SUBCUTANEOUS at 05:19

## 2024-11-01 RX ADMIN — METHOCARBAMOL 500 MG: 500 TABLET ORAL at 22:15

## 2024-11-01 RX ADMIN — ONDANSETRON 4 MG: 2 INJECTION INTRAMUSCULAR; INTRAVENOUS at 07:50

## 2024-11-01 NOTE — PROGRESS NOTES
Progress Note - Oncology-Surgical   Name: Nidhi Jeffries 59 y.o. female I MRN: [id number]  [facility] I Date of Admission: 10/30/2024   Date of Service: 11/1/2024 I Hospital Day: 2    Assessment & Plan  Gastric mass  S/p partial gastrectomy, MARCE on 10/30      Plan:  Advance to clears, toast/crackers  mIVF  D/c PCA   Strict I/Os  DVT ppx  PRN pain / nausea control  OOB ambulate        Subjective   No acute events overnight.  Reports pain around abdomen that's moderately controlled. Tolerating small amount of clear liquids without nausea or vomiting.  Has been passing gas but no bowel movement.  No acute complaints or concerns this morning.    Objective :  Temp:  [97.9 °F (36.6 °C)-98.6 °F (37 °C)] 98 °F (36.7 °C)  HR:  [76-96] 76  BP: (111-131)/(67-83) 131/77  Resp:  [15-20] 18  SpO2:  [93 %-96 %] 96 %  O2 Device: Nasal cannula  Nasal Cannula O2 Flow Rate (L/min):  [3 L/min] 3 L/min    I/O         10/30 0701  10/31 0700 10/31 0701 11/01 0700    I.V. (mL/kg) 2000 (24.8) 2044 (25.3)    IV Piggyback 350 50    Total Intake(mL/kg) 2350 (29.1) 2094 (25.9)    Urine (mL/kg/hr) 700 2350 (1.2)    Blood 200     Total Output 900 2350    Net +1450 -256                  Physical Exam  General: NAD  HENT: NCAT MMM  Neck: supple, no JVD  CV: nl rate  Lungs: nl wob. No resp distress  ABD: Soft, nondistended, appropriately tender.  Incision clean, dry, intact  Extrem: No CCE  Neuro: AAOx3      Lab Results: I have reviewed the following results:  Recent Labs     10/31/24  0527   WBC 13.61*   HGB 13.8   HCT 42.3      SODIUM 135   K 4.5   CL 98   CO2 31   BUN 11   CREATININE 0.47*   GLUC 134   MG 1.8*   PHOS 3.8             VTE Pharmacologic Prophylaxis: VTE covered by:  heparin (porcine), Subcutaneous, 5,000 Units at 10/31/24 6208     VTE Mechanical Prophylaxis: sequential compression device

## 2024-11-01 NOTE — PLAN OF CARE
Problem: PAIN - ADULT  Goal: Verbalizes/displays adequate comfort level or baseline comfort level  Description: Interventions:  - Encourage patient to monitor pain and request assistance  - Assess pain using appropriate pain scale  - Administer analgesics based on type and severity of pain and evaluate response  - Implement non-pharmacological measures as appropriate and evaluate response  - Consider cultural and social influences on pain and pain management  - Notify physician/advanced practitioner if interventions unsuccessful or patient reports new pain  Outcome: Progressing     Problem: INFECTION - ADULT  Goal: Absence or prevention of progression during hospitalization  Description: INTERVENTIONS:  - Assess and monitor for signs and symptoms of infection  - Monitor lab/diagnostic results  - Monitor all insertion sites, i.e. indwelling lines, tubes, and drains  - Monitor endotracheal if appropriate and nasal secretions for changes in amount and color  - Rudy appropriate cooling/warming therapies per order  - Administer medications as ordered  - Instruct and encourage patient and family to use good hand hygiene technique  - Identify and instruct in appropriate isolation precautions for identified infection/condition  Outcome: Progressing  Goal: Absence of fever/infection during neutropenic period  Description: INTERVENTIONS:  - Monitor WBC    Outcome: Progressing     Problem: SAFETY ADULT  Goal: Patient will remain free of falls  Description: INTERVENTIONS:  - Educate patient/family on patient safety including physical limitations  - Instruct patient to call for assistance with activity   - Consult OT/PT to assist with strengthening/mobility   - Keep Call bell within reach  - Keep bed low and locked with side rails adjusted as appropriate  - Keep care items and personal belongings within reach  - Initiate and maintain comfort rounds  - Make Fall Risk Sign visible to staff    - Apply yellow socks and  bracelet for high fall risk patients  - Consider moving patient to room near nurses station  Outcome: Progressing  Goal: Maintain or return to baseline ADL function  Description: INTERVENTIONS:  -  Assess patient's ability to carry out ADLs; assess patient's baseline for ADL function and identify physical deficits which impact ability to perform ADLs (bathing, care of mouth/teeth, toileting, grooming, dressing, etc.)  - Assess/evaluate cause of self-care deficits   - Assess range of motion  - Assess patient's mobility; develop plan if impaired  - Assess patient's need for assistive devices and provide as appropriate  - Encourage maximum independence but intervene and supervise when necessary  - Involve family in performance of ADLs  - Assess for home care needs following discharge   - Consider OT consult to assist with ADL evaluation and planning for discharge  - Provide patient education as appropriate  Outcome: Progressing  Goal: Maintains/Returns to pre admission functional level  Description: INTERVENTIONS:  - Perform AM-PAC 6 Click Basic Mobility/ Daily Activity assessment daily.  - Set and communicate daily mobility goal to care team and patient/family/caregiver.   - Collaborate with rehabilitation services on mobility goals if consulted    - Out of bed for toileting  - Record patient progress and toleration of activity level   Outcome: Progressing     Problem: DISCHARGE PLANNING  Goal: Discharge to home or other facility with appropriate resources  Description: INTERVENTIONS:  - Identify barriers to discharge w/patient and caregiver  - Arrange for needed discharge resources and transportation as appropriate  - Identify discharge learning needs (meds, wound care, etc.)  - Arrange for interpretive services to assist at discharge as needed  - Refer to Case Management Department for coordinating discharge planning if the patient needs post-hospital services based on physician/advanced practitioner order or  complex needs related to functional status, cognitive ability, or social support system  Outcome: Progressing     Problem: Knowledge Deficit  Goal: Patient/family/caregiver demonstrates understanding of disease process, treatment plan, medications, and discharge instructions  Description: Complete learning assessment and assess knowledge base.  Interventions:  - Provide teaching at level of understanding  - Provide teaching via preferred learning methods  Outcome: Progressing

## 2024-11-01 NOTE — PROGRESS NOTES
Progress Note - Acute Pain   Name: Nidhi Jeffries 59 y.o. female I MRN: [id number]  Unit/Bed#: [facility] 921-01 I Date of Admission: 10/30/2024   Date of Service: 11/1/2024 I Hospital Day: 2    Assessment & Plan  Gastric mass  Acetaminophen 975 mg p.o. every 8 hours scheduled.  Gabapentin 300 mg p.o. at bedtime.  Methocarbamol 500 mg p.o. every 8 hours scheduled.  Hydromorphone PCA with continuous rate 0 mg/h, PCA dose 0.3 mg, lockout 10 minutes and 1 hour maximum 1.8 mg.  Although patient tolerating clears, had significant nausea with taking oral medication this morning, therefore will hold off on transitioning to oral pain regimen for now.  Bowel regimen to avoid opioid-induced constipation while on opioid pain medication when appropriate from surgical standpoint, suggested while on opioid pain medication.  As needed Narcan in the event that opioid reversal becomes necessary.  When patient able to take p.o., suggest the following:  Discontinue hydromorphone PCA.  Oxycodone 2.5 mg p.o. every 4 hours as needed moderate pain or 5 mg p.o. every 4 hours as needed severe pain.  Dilaudid 0.2 mg IV every 4 hours as needed breakthrough pain x 24 hours, then discontinue.    APS will continue to follow. Please contact Acute Pain Service - via SecureChat from [phone] with additional questions or concerns. See SecureChat or [employer] for additional contacts and after hours information.     Subjective   Nidhi Jeffries is a 59 y.o. female status post partial gastrectomy on 10/30/2024.    Pain History  Current pain location(s):  Pain Score: Med Not Given for Pain - for MAR use only  Pain Location/Orientation: Location: Abdomen  Pain Scale: Pain Assessment Tool: 0-10  24 hour history: Patient states that her pain remains relatively well-controlled on Dilaudid PCA.  Has been tolerating clear liquids, however had significant nausea this morning when taking oral medication.  Diet has been advanced to clears with toast and crackers, however patient  has not had this yet today.  Discussed maintaining Dilaudid PCA for now until patient better able to take oral medication and then switching to an oral pain regimen.  Patient is in agreement with this.    Opioid requirement previous 24 hours: Total PCA dosing not documented.  Meds/Allergies   all current active meds have been reviewed, current meds:   Current Facility-Administered Medications:     acetaminophen (TYLENOL) tablet 975 mg, Q8H SHELIA    amLODIPine (NORVASC) tablet 5 mg, Daily    dextrose 5 % and sodium chloride 0.45 % with KCl 20 mEq/L infusion, Continuous, Last Rate: 84 mL/hr (11/01/24 0958)    estradiol (ESTRACE) vaginal cream 0.5 g, Once per day on Monday Thursday    gabapentin (NEURONTIN) capsule 300 mg, HS    heparin (porcine) subcutaneous injection 5,000 Units, Q8H SHELIA    HYDROmorphone (DILAUDID) 1 mg/mL 50 mL PCA, Continuous    labetalol (NORMODYNE) injection 10 mg, Q4H PRN    magnesium sulfate IVPB (premix) SOLN 1 g, Once    methocarbamol (ROBAXIN) tablet 500 mg, Q8H SHELIA    naloxone (NARCAN) 0.04 mg/mL syringe 0.04 mg, Q1MIN PRN    ondansetron (ZOFRAN) injection 4 mg, Q4H PRN    pantoprazole (PROTONIX) injection 40 mg, Q12H SHELIA    saliva substitute (MOUTH KOTE) mucosal solution 5 spray, 4x Daily PRN    scopolamine (TRANSDERM-SCOP) 1 mg/3 days TD 72 hr patch 1 patch, Q72H, and PTA meds:   Prior to Admission Medications   Prescriptions Last Dose Informant Patient Reported? Taking?   Acetaminophen (TYLENOL ARTHRITIS PAIN PO) Past Week Self Yes Yes   Sig: Take by mouth   Multiple Vitamins-Minerals (CENTRUM SILVER PO) 10/23/2024 Self Yes Yes   Sig: Take by mouth   amLODIPine (NORVASC) 5 mg tablet 10/29/2024  Yes Yes   atorvastatin (LIPITOR) 20 mg tablet 10/29/2024 Self Yes Yes   benazepril (LOTENSIN) 20 mg tablet 10/29/2024  Yes Yes   estradiol (ESTRACE) 0.1 mg/g vaginal cream 10/25/2024  No No   Sig: Use 0.5 gm daily x 2 weeks then 0.5 gm twice weekly   gabapentin (NEURONTIN) 300 mg capsule 10/29/2024   No Yes   Sig: TAKE ONE CAPSULE BY MOUTH ONCE DAILY at bedtime   pantoprazole (PROTONIX) 40 mg tablet 10/30/2024 at 700 Self No Yes   Sig: Take 1 tablet (40 mg total) by mouth 2 (two) times a day   spironolactone (ALDACTONE) 25 mg tablet 10/29/2024 Self Yes Yes      Facility-Administered Medications: None     Allergies   Allergen Reactions    Sulfa Antibiotics      Objective :  Temp:  [98 °F (36.7 °C)-98.6 °F (37 °C)] 98 °F (36.7 °C)  HR:  [76-96] 94  BP: (129-133)/(74-80) 133/80  Resp:  [17-20] 17  SpO2:  [94 %-96 %] 94 %    Physical Exam  Vitals and nursing note reviewed.   Constitutional:       General: She is awake. She is not in acute distress.     Appearance: She is not ill-appearing, toxic-appearing or diaphoretic.      Comments: Sitting up in chair.  Appears mildly uncomfortable.   Skin:     General: Skin is warm and dry.   Neurological:      Mental Status: She is alert and oriented to person, place, and time.      GCS: GCS eye subscore is 4. GCS verbal subscore is 5. GCS motor subscore is 6.   Psychiatric:         Attention and Perception: Attention normal.         Speech: Speech normal.         Behavior: Behavior normal. Behavior is cooperative.            Lab Results: I have reviewed the following results:  Estimated Creatinine Clearance: 123.6 mL/min (A) (by C-G formula based on SCr of 0.47 mg/dL (L)).  Lab Results   Component Value Date    WBC 10.20 (H) 11/01/2024    WBC 6.12 03/15/2014    HGB 12.6 11/01/2024    HGB 14.1 03/15/2014    HCT 39.4 11/01/2024    HCT 42.5 03/15/2014     11/01/2024     03/15/2014         Component Value Date/Time     02/24/2014 1010    K 4.5 10/31/2024 0527    K 3.2 (L) 02/24/2014 1010    CL 98 10/31/2024 0527     02/24/2014 1010    CO2 31 10/31/2024 0527    CO2 29 02/24/2014 1010    BUN 11 10/31/2024 0527    BUN 13 02/24/2014 1010    CREATININE 0.47 (L) 10/31/2024 0527    CREATININE 0.67 02/24/2014 1010         Component Value Date/Time    CALCIUM 8.9  10/31/2024 0527    CALCIUM 8.9 02/24/2014 1010    ALKPHOS 68 10/16/2024 0920    ALKPHOS 81 02/24/2014 1010    AST 19 10/16/2024 0920    AST 24 02/24/2014 1010    ALT 20 10/16/2024 0920    ALT 35 02/24/2014 1010    BILITOT 0.81 02/24/2014 1010    TP 6.9 10/16/2024 0920    ALB 4.3 10/16/2024 0920    ALB 3.9 02/24/2014 1010       Imaging Results Review: No pertinent imaging studies reviewed.  Other Study Results Review: No additional pertinent studies reviewed.     Administrative Statements   I have spent a total time of 31 minutes in caring for this patient on the day of the visit/encounter including Risks and benefits of tx options, Instructions for management, Patient and family education, Importance of tx compliance, Risk factor reductions, Counseling / Coordination of care, Documenting in the medical record, Reviewing / ordering tests, medicine, procedures  , Obtaining or reviewing history  , and Communicating with other healthcare professionals .

## 2024-11-01 NOTE — ASSESSMENT & PLAN NOTE
Acetaminophen 975 mg p.o. every 8 hours scheduled.  Gabapentin 300 mg p.o. at bedtime.  Methocarbamol 500 mg p.o. every 8 hours scheduled.  Hydromorphone PCA with continuous rate 0 mg/h, PCA dose 0.3 mg, lockout 10 minutes and 1 hour maximum 1.8 mg.  Although patient tolerating clears, had significant nausea with taking oral medication this morning, therefore will hold off on transitioning to oral pain regimen for now.  Bowel regimen to avoid opioid-induced constipation while on opioid pain medication when appropriate from surgical standpoint, suggested while on opioid pain medication.  As needed Narcan in the event that opioid reversal becomes necessary.  When patient able to take p.o., suggest the following:  Discontinue hydromorphone PCA.  Oxycodone 2.5 mg p.o. every 4 hours as needed moderate pain or 5 mg p.o. every 4 hours as needed severe pain.  Dilaudid 0.2 mg IV every 4 hours as needed breakthrough pain x 24 hours, then discontinue.

## 2024-11-01 NOTE — CASE MANAGEMENT
Case Management Assessment & Discharge Planning Note    Patient name Nidhi Jeffries  Location [facility] 921/[facility] 921-01 MRN [id number]  : 1965 Date 2024       Current Admission Date: 10/30/2024  Current Admission Diagnosis:Gastric mass   Patient Active Problem List    Diagnosis Date Noted Date Diagnosed    Leiomyoma of stomach 2024     Acute gastric ulcer with perforation (HCC) 2024     Gastric mass 2024     Perforated ulcer (HCC) 2024     S/P exploratory laparotomy 2024     Bilateral carpal tunnel syndrome 2023     Paresthesia of hand, bilateral 2023     Bilateral hand pain 2023     Intervertebral disc disorder with radiculopathy of lumbar region      Postmenopausal bleeding 10/23/2019     Low libido 10/23/2019     Generalized osteoarthritis 2019     Lumbar stenosis with neurogenic claudication 2018     Hyperlipidemia 2017     Lump, breast 2015     Essential hypertension 10/07/2013       LOS (days): 2  Geometric Mean LOS (GMLOS) (days): 2.1  Days to GMLOS:0.1     OBJECTIVE:    Risk of Unplanned Readmission Score: 10.75         Current admission status: Inpatient       Preferred Pharmacy:   [id number] - [place], [place] - [address]  [address]  [place] [address]  Phone: [phone] Fax: [phone]    [facility] - [place], [place] - [address]  [address]  [place] [address]  Phone: [phone] Fax: [phone]    Primary Care Provider: Arnaud Mariee DO    Primary Insurance: AETNA  Secondary Insurance:     ASSESSMENT:  Active Health Care Proxies    There are no active Health Care Proxies on file.                 Readmission Root Cause  30 Day Readmission: No    Patient Information  Admitted from:: Home  Mental Status: Alert  During Assessment patient was accompanied by: Not accompanied during assessment  Assessment information provided by:: Patient  Primary  Caregiver: Self  Support Systems: Family members, Spouse/significant other  County of Residence: [place]  What city do you live in?: [place]  Home entry access options. Select all that apply.: Stairs  Do the steps have railings?: Yes  Type of Current Residence: 2 story home  Upon entering residence, is there a bedroom on the main floor (no further steps)?: No  A bedroom is located on the following floor levels of residence (select all that apply):: 2nd Floor  Upon entering residence, is there a bathroom on the main floor (no further steps)?: Yes  Number of steps to 2nd floor from main floor: One Flight  Living Arrangements: Lives w/ Spouse/significant other, Lives w/ Extended Family  Is patient a ?: No    Activities of Daily Living Prior to Admission  Functional Status: Independent  Completes ADLs independently?: Yes  Ambulates independently?: Yes  Does patient use assisted devices?: No  Does patient currently own DME?: No  Does patient have a history of Outpatient Therapy (PT/OT)?: No  Does the patient have a history of Short-Term Rehab?: No  Does patient have a history of HHC?: No  Does patient currently have HHC?: No         Patient Information Continued  Income Source: Employed  Does patient have prescription coverage?: Yes  Does patient receive dialysis treatments?: No  Does patient have a history of substance abuse?: No  Does patient have a history of Mental Health Diagnosis?: No         Means of Transportation  Means of Transport to Appts:: Drives Self          DISCHARGE DETAILS:    Discharge planning discussed with:: pt at bedside  [place] of Choice: Yes  Comments - Freedom of Choice: Discussed FOC  CM contacted family/caregiver?: No- see comments  Were Treatment Team discharge recommendations reviewed with patient/caregiver?: Yes  Did patient/caregiver verbalize understanding of patient care needs?: Yes  Were patient/caregiver advised of the risks associated with not following Treatment Team  discharge recommendations?: Yes    Contacts  Patient Contacts: Irvin Jeffries (Child)  Relationship to Patient:: Family  Contact Method: Phone  Phone Number: [phone]  Reason/Outcome: Continuity of Care, Emergency Contact, Discharge Planning    Requested Home Health Care         Is the patient interested in HHC at discharge?: No    DME Referral Provided  Referral made for DME?: No         Would you like to participate in our [facility] service program?  : No - Declined    Treatment Team Recommendation: Home  Discharge Destination Plan:: Home  Transport at Discharge : Family                                      Additional Comments: CM introduced herself and role and completed initial assessment with pt at bedside. Pt has no post-acute rehabilitation needs. CM willl continue to follow as needed.

## 2024-11-01 NOTE — ASSESSMENT & PLAN NOTE
S/p partial gastrectomy, MARCE on 10/30      Plan:  Advance to clears, toast/crackers  mIVF  D/c PCA   Strict I/Os  DVT ppx  PRN pain / nausea control  OOB ambulate

## 2024-11-01 NOTE — ANESTHESIA POSTPROCEDURE EVALUATION
Post-Op Assessment Note    Last Filed PACU Vitals:  Vitals Value Taken Time   Temp 98.7 °F (37.1 °C) 10/30/24 1542   Pulse 109 10/30/24 1914   /71 10/30/24 1901   Resp 14 10/30/24 1914   SpO2 96 % 10/30/24 1914   Vitals shown include unfiled device data.    Modified Gemma:  No data recorded

## 2024-11-01 NOTE — RESTORATIVE TECHNICIAN NOTE
Restorative Technician Note      Patient Name: Nidhi Jeffries     Restorative Tech Visit Date: 11/01/24  Note Type: Mobility  Patient Position Upon Consult: Bedside chair  Activity Performed: Ambulated  Assistive Device: Roller walker  Patient Position at End of Consult: Supine; All needs within reach  Nurse Communication: Nurse aware of consult, application of brace    Tony Mcnally, Restorative Technician

## 2024-11-02 LAB
ANION GAP SERPL CALCULATED.3IONS-SCNC: 7 MMOL/L (ref 4–13)
BUN SERPL-MCNC: 5 MG/DL (ref 5–25)
CALCIUM SERPL-MCNC: 8.9 MG/DL (ref 8.4–10.2)
CHLORIDE SERPL-SCNC: 104 MMOL/L (ref 96–108)
CO2 SERPL-SCNC: 28 MMOL/L (ref 21–32)
CREAT SERPL-MCNC: 0.44 MG/DL (ref 0.6–1.3)
GFR SERPL CREATININE-BSD FRML MDRD: 109 ML/MIN/1.73SQ M
GLUCOSE SERPL-MCNC: 115 MG/DL (ref 65–140)
POTASSIUM SERPL-SCNC: 4 MMOL/L (ref 3.5–5.3)
SODIUM SERPL-SCNC: 139 MMOL/L (ref 135–147)

## 2024-11-02 PROCEDURE — 80048 BASIC METABOLIC PNL TOTAL CA: CPT | Performed by: PHYSICIAN ASSISTANT

## 2024-11-02 PROCEDURE — 99024 POSTOP FOLLOW-UP VISIT: CPT | Performed by: SURGERY

## 2024-11-02 RX ORDER — HYDROMORPHONE HCL IN WATER/PF 6 MG/30 ML
0.2 PATIENT CONTROLLED ANALGESIA SYRINGE INTRAVENOUS
Status: DISCONTINUED | OUTPATIENT
Start: 2024-11-02 | End: 2024-11-03 | Stop reason: HOSPADM

## 2024-11-02 RX ORDER — OXYCODONE HYDROCHLORIDE 5 MG/1
5 TABLET ORAL EVERY 4 HOURS PRN
Status: DISCONTINUED | OUTPATIENT
Start: 2024-11-02 | End: 2024-11-03 | Stop reason: HOSPADM

## 2024-11-02 RX ADMIN — ACETAMINOPHEN 975 MG: 325 TABLET, FILM COATED ORAL at 15:31

## 2024-11-02 RX ADMIN — ACETAMINOPHEN 975 MG: 325 TABLET, FILM COATED ORAL at 21:08

## 2024-11-02 RX ADMIN — AMLODIPINE BESYLATE 5 MG: 5 TABLET ORAL at 09:48

## 2024-11-02 RX ADMIN — HEPARIN SODIUM 5000 UNITS: 5000 INJECTION INTRAVENOUS; SUBCUTANEOUS at 15:22

## 2024-11-02 RX ADMIN — HEPARIN SODIUM 5000 UNITS: 5000 INJECTION INTRAVENOUS; SUBCUTANEOUS at 05:27

## 2024-11-02 RX ADMIN — GABAPENTIN 300 MG: 300 CAPSULE ORAL at 21:08

## 2024-11-02 RX ADMIN — Medication: at 14:45

## 2024-11-02 RX ADMIN — METHOCARBAMOL 500 MG: 500 TABLET ORAL at 21:08

## 2024-11-02 RX ADMIN — PANTOPRAZOLE SODIUM 40 MG: 40 INJECTION, POWDER, FOR SOLUTION INTRAVENOUS at 21:08

## 2024-11-02 RX ADMIN — HEPARIN SODIUM 5000 UNITS: 5000 INJECTION INTRAVENOUS; SUBCUTANEOUS at 21:08

## 2024-11-02 RX ADMIN — PANTOPRAZOLE SODIUM 40 MG: 40 INJECTION, POWDER, FOR SOLUTION INTRAVENOUS at 09:49

## 2024-11-02 RX ADMIN — METHOCARBAMOL 500 MG: 500 TABLET ORAL at 05:27

## 2024-11-02 RX ADMIN — METHOCARBAMOL 500 MG: 500 TABLET ORAL at 15:31

## 2024-11-02 RX ADMIN — ACETAMINOPHEN 975 MG: 325 TABLET, FILM COATED ORAL at 05:27

## 2024-11-02 NOTE — ASSESSMENT & PLAN NOTE
59-year-old female now status post 10/30 ex lap, partial gastrectomy    Afebrile and hemodynamically normal on room air  Urine output 3950 cc    A.m. labs still pending    Plan  Consider advancement to postgastrectomy clear/toast/crackers  Continue maintenance IV fluids at 84 cc an hour  Continue PCA  DVT prophylaxis  As needed pain meds and antinausea meds  Encourage out of bed and ambulation  Encourage aggressive I-S  Follow-up a.m. labs

## 2024-11-02 NOTE — PROGRESS NOTES
Progress Note - Oncology-Surgical   Name: Nidhi Jeffries 59 y.o. female I MRN: [id number]  Unit/Bed#: [facility] 921-01 I Date of Admission: 10/30/2024   Date of Service: 11/2/2024 I Hospital Day: 3    Assessment & Plan  Gastric mass  59-year-old female now status post 10/30 ex lap, partial gastrectomy    Afebrile and hemodynamically normal on room air  Urine output 3950 cc    A.m. labs still pending    Plan  Consider advancement to postgastrectomy clear/toast/crackers  Continue maintenance IV fluids at 84 cc an hour  Continue PCA  DVT prophylaxis  As needed pain meds and antinausea meds  Encourage out of bed and ambulation  Encourage aggressive I-S  Follow-up a.m. labs      Subjective   No acute events overnight.  Patient endorses moderate amount of abdominal pain but she states that the pain is controlled with the PCA.  Denies having nausea, vomiting, fevers, chills, chest pain, shortness of breath.  Tolerating oral diet however she has not taken in much p.o.  Minimal appetite.  No bowel movements and no flatus at this point time.  Voiding without difficulty.    Objective :  Temp:  [98 °F (36.7 °C)-98.7 °F (37.1 °C)] 98.2 °F (36.8 °C)  HR:  [70-97] 70  BP: (124-133)/(78-80) 127/78  Resp:  [14-18] 14  SpO2:  [94 %-96 %] 94 %    I/O         10/31 0701  11/01 0700 11/01 0701  11/02 0700    P.O.  360    I.V. (mL/kg) 2044 (25.3) 2004.5 (24.8)    IV Piggyback 50     Total Intake(mL/kg) 2094 (25.9) 2364.5 (29.3)    Urine (mL/kg/hr) 2350 (1.2) 3950 (2)    Total Output 2350 3950    Net -256 -1585.5                  Physical Exam      Physical Exam:  General: No acute distress, alert and oriented  Neuro: alert, oriented x3  HENT: PERRL, EOMI  CV: Well perfused, regular rate and rhythm  Lungs: Normal work of breathing, no increased respiratory effort  Abdomen: Soft, appropriately tender, minimally distended.  Incisions are clean/dry/intact.  MSK/Extremities: No edema, clubbing or cyanosis  Skin: Warm, dry      Lab Results: I have  reviewed the following results:  Recent Labs     10/31/24  0527 11/01/24  0637   WBC 13.61* 10.20*   HGB 13.8 12.6   HCT 42.3 39.4    209   SODIUM 135  --    K 4.5  --    CL 98  --    CO2 31  --    BUN 11  --    CREATININE 0.47*  --    GLUC 134  --    MG 1.8* 1.9   PHOS 3.8  --          VTE Pharmacologic Prophylaxis: Heparin  VTE Mechanical Prophylaxis: sequential compression device

## 2024-11-02 NOTE — RESTORATIVE TECHNICIAN NOTE
Restorative Technician Note      Patient Name: Nidhi Jeffries     Restorative Tech Visit Date: 11/02/24  Note Type: Mobility  Patient Position Upon Consult: Supine  Activity Performed: Ambulated  Assistive Device: Roller walker  Patient Position at End of Consult: Supine; All needs within reach  Nurse Communication: Nurse aware of consult, application of brace    Tony Mcnally, Restorative Technician

## 2024-11-03 VITALS
WEIGHT: 178 LBS | DIASTOLIC BLOOD PRESSURE: 86 MMHG | RESPIRATION RATE: 18 BRPM | HEIGHT: 62 IN | BODY MASS INDEX: 32.76 KG/M2 | OXYGEN SATURATION: 97 % | SYSTOLIC BLOOD PRESSURE: 144 MMHG | TEMPERATURE: 98.2 F | HEART RATE: 91 BPM

## 2024-11-03 LAB
ANION GAP SERPL CALCULATED.3IONS-SCNC: 8 MMOL/L (ref 4–13)
BASOPHILS # BLD AUTO: 0.09 THOUSANDS/ΜL (ref 0–0.1)
BASOPHILS NFR BLD AUTO: 1 % (ref 0–1)
BUN SERPL-MCNC: 8 MG/DL (ref 5–25)
CALCIUM SERPL-MCNC: 9.4 MG/DL (ref 8.4–10.2)
CHLORIDE SERPL-SCNC: 101 MMOL/L (ref 96–108)
CO2 SERPL-SCNC: 30 MMOL/L (ref 21–32)
CREAT SERPL-MCNC: 0.5 MG/DL (ref 0.6–1.3)
EOSINOPHIL # BLD AUTO: 0.54 THOUSAND/ΜL (ref 0–0.61)
EOSINOPHIL NFR BLD AUTO: 7 % (ref 0–6)
ERYTHROCYTE [DISTWIDTH] IN BLOOD BY AUTOMATED COUNT: 13.6 % (ref 11.6–15.1)
GFR SERPL CREATININE-BSD FRML MDRD: 104 ML/MIN/1.73SQ M
GLUCOSE SERPL-MCNC: 98 MG/DL (ref 65–140)
HCT VFR BLD AUTO: 40.3 % (ref 34.8–46.1)
HGB BLD-MCNC: 13.3 G/DL (ref 11.5–15.4)
IMM GRANULOCYTES # BLD AUTO: 0.02 THOUSAND/UL (ref 0–0.2)
IMM GRANULOCYTES NFR BLD AUTO: 0 % (ref 0–2)
LYMPHOCYTES # BLD AUTO: 1.53 THOUSANDS/ΜL (ref 0.6–4.47)
LYMPHOCYTES NFR BLD AUTO: 19 % (ref 14–44)
MCH RBC QN AUTO: 29.4 PG (ref 26.8–34.3)
MCHC RBC AUTO-ENTMCNC: 33 G/DL (ref 31.4–37.4)
MCV RBC AUTO: 89 FL (ref 82–98)
MONOCYTES # BLD AUTO: 0.64 THOUSAND/ΜL (ref 0.17–1.22)
MONOCYTES NFR BLD AUTO: 8 % (ref 4–12)
NEUTROPHILS # BLD AUTO: 5.41 THOUSANDS/ΜL (ref 1.85–7.62)
NEUTS SEG NFR BLD AUTO: 65 % (ref 43–75)
NRBC BLD AUTO-RTO: 0 /100 WBCS
PLATELET # BLD AUTO: 249 THOUSANDS/UL (ref 149–390)
PMV BLD AUTO: 10.8 FL (ref 8.9–12.7)
POTASSIUM SERPL-SCNC: 3.7 MMOL/L (ref 3.5–5.3)
RBC # BLD AUTO: 4.53 MILLION/UL (ref 3.81–5.12)
SODIUM SERPL-SCNC: 139 MMOL/L (ref 135–147)
WBC # BLD AUTO: 8.23 THOUSAND/UL (ref 4.31–10.16)

## 2024-11-03 PROCEDURE — 99024 POSTOP FOLLOW-UP VISIT: CPT | Performed by: SURGERY

## 2024-11-03 PROCEDURE — 80048 BASIC METABOLIC PNL TOTAL CA: CPT

## 2024-11-03 PROCEDURE — 85025 COMPLETE CBC W/AUTO DIFF WBC: CPT

## 2024-11-03 RX ORDER — OXYCODONE HYDROCHLORIDE 5 MG/1
2.5 TABLET ORAL EVERY 6 HOURS PRN
Qty: 5 TABLET | Refills: 0 | Status: SHIPPED | OUTPATIENT
Start: 2024-11-03 | End: 2024-11-06

## 2024-11-03 RX ORDER — POLYETHYLENE GLYCOL 3350 17 G/17G
17 POWDER, FOR SOLUTION ORAL DAILY
Status: DISCONTINUED | OUTPATIENT
Start: 2024-11-03 | End: 2024-11-03 | Stop reason: HOSPADM

## 2024-11-03 RX ORDER — DOCUSATE SODIUM 100 MG/1
100 CAPSULE, LIQUID FILLED ORAL 2 TIMES DAILY
Qty: 14 CAPSULE | Refills: 0 | Status: SHIPPED | OUTPATIENT
Start: 2024-11-03 | End: 2024-11-10

## 2024-11-03 RX ORDER — ACETAMINOPHEN 325 MG/1
650 TABLET ORAL EVERY 6 HOURS PRN
Start: 2024-11-03

## 2024-11-03 RX ORDER — METHOCARBAMOL 500 MG/1
500 TABLET, FILM COATED ORAL EVERY 8 HOURS SCHEDULED
Qty: 42 TABLET | Refills: 0 | Status: SHIPPED | OUTPATIENT
Start: 2024-11-03 | End: 2024-11-17

## 2024-11-03 RX ORDER — ONDANSETRON 4 MG/1
4 TABLET, ORALLY DISINTEGRATING ORAL EVERY 6 HOURS PRN
Qty: 28 TABLET | Refills: 0 | Status: SHIPPED | OUTPATIENT
Start: 2024-11-03 | End: 2024-11-10

## 2024-11-03 RX ADMIN — ONDANSETRON 4 MG: 2 INJECTION INTRAMUSCULAR; INTRAVENOUS at 08:22

## 2024-11-03 RX ADMIN — ACETAMINOPHEN 975 MG: 325 TABLET, FILM COATED ORAL at 13:50

## 2024-11-03 RX ADMIN — METHOCARBAMOL 500 MG: 500 TABLET ORAL at 05:57

## 2024-11-03 RX ADMIN — HEPARIN SODIUM 5000 UNITS: 5000 INJECTION INTRAVENOUS; SUBCUTANEOUS at 05:57

## 2024-11-03 RX ADMIN — AMLODIPINE BESYLATE 5 MG: 5 TABLET ORAL at 08:22

## 2024-11-03 RX ADMIN — PANTOPRAZOLE SODIUM 40 MG: 40 INJECTION, POWDER, FOR SOLUTION INTRAVENOUS at 08:22

## 2024-11-03 RX ADMIN — POLYETHYLENE GLYCOL 3350 17 G: 17 POWDER, FOR SOLUTION ORAL at 10:51

## 2024-11-03 RX ADMIN — ACETAMINOPHEN 975 MG: 325 TABLET, FILM COATED ORAL at 05:57

## 2024-11-03 RX ADMIN — METHOCARBAMOL 500 MG: 500 TABLET ORAL at 13:50

## 2024-11-03 NOTE — ASSESSMENT & PLAN NOTE
59-year-old female now status post 10/30 ex lap, partial gastrectomy    Afebrile and hemodynamically normal on room air  Urine output 1250 cc and 2 times on recorded    A.m. labs pending    Plan  Continue postgastrectomy diet  DVT prophylaxis  As needed pain meds and antinausea meds  Give MiraLAX and Dulcolax  Encourage out of bed and ambulation  Encourage aggressive I-S  Follow-up a.m. labs  Possible discharge home within the next 24 to 48 hours

## 2024-11-03 NOTE — PROGRESS NOTES
Progress Note - Oncology-Surgical   Name: Nidhi Jeffries 59 y.o. female I MRN: [id number]  [facility] I Date of Admission: 10/30/2024   Date of Service: 11/3/2024 I Hospital Day: 4    Assessment & Plan  Gastric mass  59-year-old female now status post 10/30 ex lap, partial gastrectomy    Afebrile and hemodynamically normal on room air  Urine output 1250 cc and 2 times on recorded    A.m. labs pending    Plan  Continue postgastrectomy diet  DVT prophylaxis  As needed pain meds and antinausea meds  Give MiraLAX and Dulcolax  Encourage out of bed and ambulation  Encourage aggressive I-S  Follow-up a.m. labs  Possible discharge home within the next 24 to 48 hours      Subjective   No acute events overnight. Patient states she has no abdominal pain. Denies having nausea, vomiting, fevers, chills, chest pain, shortness of breath. Tolerating PO intake. Voiding without difficulty. No bowel movements but passing flatus.    Objective :  Temp:  [98.2 °F (36.8 °C)-98.8 °F (37.1 °C)] 98.2 °F (36.8 °C)  HR:  [80-91] 91  BP: (120-144)/(74-86) 144/86  Resp:  [16-18] 18  SpO2:  [94 %-97 %] 97 %    I/O         11/01 0701  11/02 0700 11/02 0701  11/03 0700 11/03 0701  11/04 0700    P.O. 360 445     I.V. (mL/kg) 2004.5 (24.8) 1052.3 (13)     IV Piggyback       Total Intake(mL/kg) 2364.5 (29.3) 1497.3 (18.6)     Urine (mL/kg/hr) 3950 (2) 1250 (0.6)     Total Output 3950 1250     Net -1585.5 +247.3            Unmeasured Urine Occurrence  2 x             Physical Exam      Physical Exam:  General: No acute distress, alert and oriented  Neuro: alert, oriented x3  HENT: PERRL, EOMI  CV: Well perfused, regular rate and rhythm  Lungs: Normal work of breathing, no increased respiratory effort  Abdomen: Soft, non-tender, non-distended. Incision clean, dry and intact.  MSK/Extremities: No edema, clubbing or cyanosis  Skin: Warm, dry      Lab Results: I have reviewed the following results:  Recent Labs     11/01/24  0637 11/02/24  0537  11/03/24  0547   WBC 10.20*  --  8.23   HGB 12.6  --  13.3   HCT 39.4  --  40.3     --  249   SODIUM  --    < > 139   K  --    < > 3.7   CL  --    < > 101   CO2  --    < > 30   BUN  --    < > 8   CREATININE  --    < > 0.50*   GLUC  --    < > 98   MG 1.9  --   --     < > = values in this interval not displayed.         VTE Pharmacologic Prophylaxis: Heparin  VTE Mechanical Prophylaxis: sequential compression device

## 2024-11-03 NOTE — PLAN OF CARE
Problem: PAIN - ADULT  Goal: Verbalizes/displays adequate comfort level or baseline comfort level  Description: Interventions:  - Encourage patient to monitor pain and request assistance  - Assess pain using appropriate pain scale  - Administer analgesics based on type and severity of pain and evaluate response  - Implement non-pharmacological measures as appropriate and evaluate response  - Consider cultural and social influences on pain and pain management  - Notify physician/advanced practitioner if interventions unsuccessful or patient reports new pain  Outcome: Progressing     Problem: INFECTION - ADULT  Goal: Absence or prevention of progression during hospitalization  Description: INTERVENTIONS:  - Assess and monitor for signs and symptoms of infection  - Monitor lab/diagnostic results  - Monitor all insertion sites, i.e. indwelling lines, tubes, and drains  - Monitor endotracheal if appropriate and nasal secretions for changes in amount and color  - [place] appropriate cooling/warming therapies per order  - Administer medications as ordered  - Instruct and encourage patient and family to use good hand hygiene technique  - Identify and instruct in appropriate isolation precautions for identified infection/condition  Outcome: Progressing     Problem: SAFETY ADULT  Goal: Patient will remain free of falls  Description: INTERVENTIONS:  - Educate patient/family on patient safety including physical limitations  - Instruct patient to call for assistance with activity   - Consult OT/PT to assist with strengthening/mobility   - Keep Call bell within reach  - Keep bed low and locked with side rails adjusted as appropriate  - Keep care items and personal belongings within reach  - Initiate and maintain comfort rounds  - Make Fall Risk Sign visible to staff  - Offer Toileting every  Hours, in advance of need  - Initiate/Maintain alarm  - Obtain necessary fall risk management equipment:   - Apply yellow socks and  bracelet for high fall risk patients  - Consider moving patient to room near nurses station  Outcome: Progressing  Goal: Maintain or return to baseline ADL function  Description: INTERVENTIONS:  -  Assess patient's ability to carry out ADLs; assess patient's baseline for ADL function and identify physical deficits which impact ability to perform ADLs (bathing, care of mouth/teeth, toileting, grooming, dressing, etc.)  - Assess/evaluate cause of self-care deficits   - Assess range of motion  - Assess patient's mobility; develop plan if impaired  - Assess patient's need for assistive devices and provide as appropriate  - Encourage maximum independence but intervene and supervise when necessary  - Involve family in performance of ADLs  - Assess for home care needs following discharge   - Consider OT consult to assist with ADL evaluation and planning for discharge  - Provide patient education as appropriate  Outcome: Progressing  Goal: Maintains/Returns to pre admission functional level  Description: INTERVENTIONS:  - Perform AM-PAC 6 Click Basic Mobility/ Daily Activity assessment daily.  - Set and communicate daily mobility goal to care team and patient/family/caregiver.   - Collaborate with rehabilitation services on mobility goals if consulted  - Perform Range of Motion 3 times a day.  - Reposition patient every 2 hours.  - Dangle patient 3 times a day  - Stand patient 3 times a day  - Ambulate patient 3 times a day  - Out of bed to chair 3 times a day   - Out of bed for meals 3 times a day  - Out of bed for toileting  - Record patient progress and toleration of activity level   Outcome: Progressing     Problem: DISCHARGE PLANNING  Goal: Discharge to home or other facility with appropriate resources  Description: INTERVENTIONS:  - Identify barriers to discharge w/patient and caregiver  - Arrange for needed discharge resources and transportation as appropriate  - Identify discharge learning needs (meds, wound care,  etc.)  - Arrange for interpretive services to assist at discharge as needed  - Refer to Case Management Department for coordinating discharge planning if the patient needs post-hospital services based on physician/advanced practitioner order or complex needs related to functional status, cognitive ability, or social support system  Outcome: Progressing     Problem: Knowledge Deficit  Goal: Patient/family/caregiver demonstrates understanding of disease process, treatment plan, medications, and discharge instructions  Description: Complete learning assessment and assess knowledge base.  Interventions:  - Provide teaching at level of understanding  - Provide teaching via preferred learning methods  Outcome: Progressing

## 2024-11-03 NOTE — DISCHARGE INSTR - AVS FIRST PAGE
Please follow up as instructed    Activity:    Do not lift more than 10 pounds (a gallon of milk) for 1-2 weeks post-operatively                 Walking is encouraged  Normal daily activities including climbing steps are okay  Do not engage in strenuous activity or contact sports for 4-6 weeks post-operatively    Return to work:   Return to work to be discussed at first post-operative visit    Diet:   Small frequent meals - post-gastrectomy diet    Wound Care:  It is okay to shower. Wash incision gently with soap and water and pat dry. Do not soak incisions in bath water or swim for two weeks. Do not apply any creams or ointments.    Pain Medication:   Please take as directed  No driving while taking narcotic pain medications    Other:  If you have questions after discharge please call the office.    If you have increased pain, fever >101.5, increased drainage, redness or a bad smell at your surgery site, please call us immediately or come directly to the Emergency Room

## 2024-11-03 NOTE — DISCHARGE SUMMARY
"Discharge Summary - Surgery-General   Name: Nidhi Jeffries 59 y.o. female I MRN: [id number]  [facility] I Date of Admission: 10/30/2024   Date of Service: 11/3/2024 I Hospital Day: 4    Admission Date: 10/30/2024 0857  Discharge Date: 11/03/24  Admitting Diagnosis: Gastric mass [K31.89]  Leiomyoma of stomach [D21.4]  Discharge Diagnosis:   Medical Problems       Resolved Problems  Date Reviewed: 9/26/2024   None         HPI:   Per Dr. Valencia: \" 58-year-old female who presented with abdominal pain in July 2024.  CT from July 10, 2024 revealed a 3 x 4.5 x 3.2 cm along the lesser curve near the GE junction.  There were also findings of a perforated gastric ulcer.  I personally reviewed the films.  This ulcer was repaired.  When she recovered she had workup of the gastric mass.  She then underwent endoscopic ultrasound on September 13, 2024.  This revealed a submucosal mass just distal to the GE junction seen on retroflexion.  This was 40 cm from the incisors.  This was within the submucosa.  Pathology revealed fragments of smooth muscle that was consistent with leiomyoma.   was negative.  He comes in now to discuss further therapy.  She denies any abdominal pain, nausea or vomiting.  No early satiety.  No change in appetite or unintentional weight loss. \"    Procedures Performed: No orders of the defined types were placed in this encounter.      Summary of Hospital Course:   Patient presented on 10/30 for electively scheduled partial gastrectomy for gastric mass concerning for underlying leiomyoma. She was taken to the OR where intraoperative findings included \"submucosal mass in the cardia of the stomach. No evidence of metastatic disease.\" She progressed well post-operatively. Her NG tube was removed on post-op day one. Her diet was slowly advanced, of which she was tolerating. She worked with PT and OT and was recommended no post-acute rehab needs. She was deemed stable for discharge from a surgical " standpoint on 11/3 with close outpatient follow up. She will continue a post-gastrectomy diet upon discharge.     Significant Findings, Care, Treatment and Services Provided: As noted, see daily progress notes for details.     Complications: As noted, see daily progress notes for details.     Condition at Discharge: stable       Discharge instructions/Information to patient and family:   See After Visit Summary (AVS) for information provided to patient and family.      Provisions for Follow-Up Care:  See after visit summary for information related to follow-up care and any pertinent home health orders.      PCP: Arnaud Mariee,     Disposition: Home    Planned Readmission: No     Discharge Medications:  See after visit summary for reconciled discharge medications provided to patient and family.      Discharge Statement:  I have spent a total time of 25 minutes in caring for this patient on the day of the visit/encounter.

## 2024-11-12 ENCOUNTER — OFFICE VISIT (OUTPATIENT)
Dept: SURGICAL ONCOLOGY | Facility: CLINIC | Age: 59
End: 2024-11-12

## 2024-11-12 VITALS
RESPIRATION RATE: 18 BRPM | OXYGEN SATURATION: 97 % | WEIGHT: 176 LBS | HEART RATE: 93 BPM | TEMPERATURE: 97.5 F | SYSTOLIC BLOOD PRESSURE: 124 MMHG | DIASTOLIC BLOOD PRESSURE: 86 MMHG | HEIGHT: 62 IN | BODY MASS INDEX: 32.39 KG/M2

## 2024-11-12 DIAGNOSIS — D21.4 LEIOMYOMA OF STOMACH: Primary | ICD-10-CM

## 2024-11-12 PROCEDURE — 99024 POSTOP FOLLOW-UP VISIT: CPT | Performed by: SURGERY

## 2024-11-12 NOTE — PROGRESS NOTES
Surgical Oncology Follow Up       [facility] [place]  [address]  [facility] [address]    Nidhi Jeffries  1965  [id number]  [facility] [place]  [address]  [facility] [address]    1. Leiomyoma of stomach  Assessment & Plan:  59-year-old female with a gastric mass.  He underwent resection and pathology revealed this was a leiomyoma.  She is doing well.  We discussed that it will still take another few weeks for her appetite to return to baseline.  I suspect her belching is postsurgical as we did use a bougie to make sure we did not narrow the esophagus.  She will continue to monitor her symptoms.  If these do not return to normal within the next few weeks she will contact us.  I will see her again in the future should the need arise. She is agreeable to this plan. All her questions were answered.          Chief Complaint   Patient presents with    Post-op       No follow-ups on file.      Oncology History    No history exists.           History of Present Illness: Patient returns in follow-up.  She is doing well.  She only complains of some increased belching.  Her appetite is not back to baseline.  Her pathology revealed a submucosal leiomyoma.    Review of Systems  Complete ROS Surg Onc:   Complete ROS Surg Onc:   Constitutional: The patient denies new or recent history of general fatigue, no recent weight loss, no change in appetite.   Eyes: No complaints of visual problems, no scleral icterus.   ENT: no complaints of ear pain, no hoarseness, no difficulty swallowing,  no tinnitus and no new masses in head, oral cavity, or neck.   Cardiovascular: No complaints of chest pain, no palpitations, no ankle edema.   Respiratory: No complaints of shortness of breath, no cough.   Gastrointestinal: No complaints of jaundice, no bloody stools, no pale stools.   Genitourinary: No  complaints of dysuria, no hematuria, no nocturia, no frequent urination, no urethral discharge.   Musculoskeletal: No complaints of weakness, paralysis, joint stiffness or arthralgias.  Integumentary: No complaints of rash, no new lesions.   Neurological: No complaints of convulsions, no seizures, no dizziness.   Hematologic/Lymphatic: No complaints of easy bruising.   Endocrine:  No hot or cold intolerance.  No polydipsia, polyphagia, or polyuria.  Allergy/immunology:  No environmental allergies.  No food allergies.  Not immunocompromised.  Skin:  No pallor or rash.  No wound.        Patient Active Problem List   Diagnosis    Lumbar stenosis with neurogenic claudication    Essential hypertension    Generalized osteoarthritis    Hyperlipidemia    Lump, breast    Postmenopausal bleeding    Low libido    Intervertebral disc disorder with radiculopathy of lumbar region    Bilateral carpal tunnel syndrome    Paresthesia of hand, bilateral    Bilateral hand pain    S/P exploratory laparotomy    Perforated ulcer (HCC)    Gastric mass    Acute gastric ulcer with perforation (HCC)    Leiomyoma of stomach     Past Medical History:   Diagnosis Date    Hypertension     Rheumatoid arthritis (HCC)      Past Surgical History:   Procedure Laterality Date     SECTION      LAPAROTOMY N/A 7/10/2024    Procedure: LAPAROTOMY EXPLORATORY, MODIFIED KYRIE PATCH;  Surgeon: Luis Alberto Lopez DO;  [facility];  Service: General    ND GSTRCT PRTL DSTL W/GASTRODUODENOSTOMY N/A 10/30/2024    Procedure: PARTIAL GASTRECTOMY LYSIS OF ADHESIONS;  Surgeon: Dixon Valencia MD;  [facility];  Service: Surgical Oncology     Family History   Problem Relation Age of Onset    Heart disease Mother     Hypertension Mother     Diabetes Mother     No Known Problems Father     No Known Problems Maternal Grandmother     No Known Problems Maternal Grandfather     No Known Problems Paternal Grandmother     Prostate cancer Paternal  Grandfather 70    No Known Problems Brother     No Known Problems Brother     No Known Problems Brother     No Known Problems Son     Breast cancer Maternal Aunt 70    Ovarian cancer Paternal Aunt     Endometrial cancer Paternal Aunt 36    No Known Problems Family     Colon cancer Neg Hx      Social History     Socioeconomic History    Marital status: /Civil Union     Spouse name: Not on file    Number of children: Not on file    Years of education: Not on file    Highest education level: Not on file   Occupational History    Not on file   Tobacco Use    Smoking status: Former    Smokeless tobacco: Never   Vaping Use    Vaping status: Never Used   Substance and Sexual Activity    Alcohol use: Not Currently    Drug use: No    Sexual activity: Not Currently     Partners: Male     Comment:    Other Topics Concern    Not on file   Social History Narrative    Not on file     Social Determinants of Health     Financial Resource Strain: Not on file   Food Insecurity: No Food Insecurity (7/12/2024)    Nursing - Inadequate Food Risk Classification     Worried About Running Out of Food in the Last Year: Never true     Ran Out of Food in the Last Year: Never true     Ran Out of Food in the Last Year: Not on file   Transportation Needs: No Transportation Needs (7/12/2024)    PRAPARE - Transportation     Lack of Transportation (Medical): No     Lack of Transportation (Non-Medical): No   Physical Activity: Not on file   Stress: Not on file   Social Connections: Not on file   Intimate Partner Violence: Not on file   Housing Stability: Unknown (7/12/2024)    Housing Stability Vital Sign     Unable to Pay for Housing in the Last Year: No     Number of Times Moved in the Last Year: Not on file     Homeless in the Last Year: No       Current Outpatient Medications:     acetaminophen (TYLENOL) 325 mg tablet, Take 2 tablets (650 mg total) by mouth every 6 (six) hours as needed for mild pain, Disp: , Rfl:     amLODIPine  (NORVASC) 5 mg tablet, , Disp: , Rfl:     atorvastatin (LIPITOR) 20 mg tablet, , Disp: , Rfl:     benazepril (LOTENSIN) 20 mg tablet, , Disp: , Rfl:     estradiol (ESTRACE) 0.1 mg/g vaginal cream, Use 0.5 gm daily x 2 weeks then 0.5 gm twice weekly, Disp: 42.5 g, Rfl: 1    gabapentin (NEURONTIN) 300 mg capsule, TAKE ONE CAPSULE BY MOUTH ONCE DAILY at bedtime, Disp: 30 capsule, Rfl: 2    methocarbamol (ROBAXIN) 500 mg tablet, Take 1 tablet (500 mg total) by mouth every 8 (eight) hours for 14 days, Disp: 42 tablet, Rfl: 0    Multiple Vitamins-Minerals (CENTRUM SILVER PO), Take by mouth, Disp: , Rfl:     pantoprazole (PROTONIX) 40 mg tablet, Take 1 tablet (40 mg total) by mouth 2 (two) times a day, Disp: 60 tablet, Rfl: 0    spironolactone (ALDACTONE) 25 mg tablet, , Disp: , Rfl:     docusate sodium (COLACE) 100 mg capsule, Take 1 capsule (100 mg total) by mouth 2 (two) times a day for 7 days (Patient not taking: Reported on 11/12/2024), Disp: 14 capsule, Rfl: 0    ondansetron (ZOFRAN-ODT) 4 mg disintegrating tablet, Take 1 tablet (4 mg total) by mouth every 6 (six) hours as needed for nausea or vomiting for up to 7 days (Patient not taking: Reported on 11/12/2024), Disp: 28 tablet, Rfl: 0  Allergies   Allergen Reactions    Sulfa Antibiotics      Vitals:    11/12/24 1044   BP: 124/86   Pulse: 93   Resp: 18   Temp: 97.5 °F (36.4 °C)   SpO2: 97%       Physical Exam  Constitutional: General appearance: The Patient is well-developed and well-nourished who appears the stated age in no acute distress. Patient is pleasant and talkative.     HEENT:  Normocephalic.  Sclerae are anicteric. Mucous membranes are moist.    Abdomen: Abdomen is soft, non-tender, non-distended and without masses.  Incision is C/D/I.  Extremities: There is no clubbing or cyanosis. There is no edema.  Symmetric.  Neuro: Grossly nonfocal. Gait is normal.     Skin: Warm, anicteric.    Psych:  Patient is pleasant and  talkative.  Breasts:        Pathology:  Final Diagnosis   A. Stomach (Mass), Resection:  - Submucosal leiomyoma, completely resected. See Note.   Electronically signed by Jacinta Rader DO on 11/11/2024 at 11:08 AM   Note    Immunohistochemical stains demonstrate positive tumor cell staining for SMA, Desmin, and Actin, while DOG1, , CD34, and S100 are negative. Ki67 proliferative index is low, and mitoses are scattered and rare. The findings support the diagnosis.     Intradepartmental consultation is in agreement (NAHEED).       Labs:      Imaging  No results found.  I personally reviewed and interpreted the above laboratory and imaging data.

## 2024-11-12 NOTE — LETTER
November 12, 2024     Arnaud Nicholas, DO  [address] [facility] [address]    Patient: Nidhi Jeffries   YOB: 1965   Date of Visit: 11/12/2024       Dear Dr. Nicholas:    Thank you for referring Nidhi Jeffries to me for evaluation. Below are my notes for this consultation.    If you have questions, please do not hesitate to call me. I look forward to following your patient along with you.         Sincerely,        Dixon Valencia MD        CC: MD Dixon Hernandez MD  11/12/2024 11:08 AM  Sign when Signing Visit               Surgical Oncology Follow Up       [facility] [address] [address]    Nidhi Jeffries  1965  [id number]  [facility] [place]  [address]  [place] [address]    1. Leiomyoma of stomach  Assessment & Plan:  59-year-old female with a gastric mass.  He underwent resection and pathology revealed this was a leiomyoma.  She is doing well.  We discussed that it will still take another few weeks for her appetite to return to baseline.  I suspect her belching is postsurgical as we did use a bougie to make sure we did not narrow the esophagus.  She will continue to monitor her symptoms.  If these do not return to normal within the next few weeks she will contact us.  I will see her again in the future should the need arise. She is agreeable to this plan. All her questions were answered.          Chief Complaint   Patient presents with   • Post-op       No follow-ups on file.      Oncology History    No history exists.           History of Present Illness: Patient returns in follow-up.  She is doing well.  She only complains of some increased belching.  Her appetite is not back to baseline.  Her pathology revealed a submucosal leiomyoma.    Review of Systems  Complete ROS Surg Onc:   Complete ROS Surg Onc:   Constitutional: The patient  denies new or recent history of general fatigue, no recent weight loss, no change in appetite.   Eyes: No complaints of visual problems, no scleral icterus.   ENT: no complaints of ear pain, no hoarseness, no difficulty swallowing,  no tinnitus and no new masses in head, oral cavity, or neck.   Cardiovascular: No complaints of chest pain, no palpitations, no ankle edema.   Respiratory: No complaints of shortness of breath, no cough.   Gastrointestinal: No complaints of jaundice, no bloody stools, no pale stools.   Genitourinary: No complaints of dysuria, no hematuria, no nocturia, no frequent urination, no urethral discharge.   Musculoskeletal: No complaints of weakness, paralysis, joint stiffness or arthralgias.  Integumentary: No complaints of rash, no new lesions.   Neurological: No complaints of convulsions, no seizures, no dizziness.   Hematologic/Lymphatic: No complaints of easy bruising.   Endocrine:  No hot or cold intolerance.  No polydipsia, polyphagia, or polyuria.  Allergy/immunology:  No environmental allergies.  No food allergies.  Not immunocompromised.  Skin:  No pallor or rash.  No wound.        Patient Active Problem List   Diagnosis   • Lumbar stenosis with neurogenic claudication   • Essential hypertension   • Generalized osteoarthritis   • Hyperlipidemia   • Lump, breast   • Postmenopausal bleeding   • Low libido   • Intervertebral disc disorder with radiculopathy of lumbar region   • Bilateral carpal tunnel syndrome   • Paresthesia of hand, bilateral   • Bilateral hand pain   • S/P exploratory laparotomy   • Perforated ulcer (HCC)   • Gastric mass   • Acute gastric ulcer with perforation (HCC)   • Leiomyoma of stomach     Past Medical History:   Diagnosis Date   • Hypertension    • Rheumatoid arthritis (HCC)      Past Surgical History:   Procedure Laterality Date   •  SECTION     • LAPAROTOMY N/A 7/10/2024    Procedure: LAPAROTOMY EXPLORATORY, MODIFIED KYRIE PATCH;  Surgeon: Luis Alberto  Yusuf Lopez DO;  [facility];  Service: General   • TN GSTRCT PRTL DSTL W/GASTRODUODENOSTOMY N/A 10/30/2024    Procedure: PARTIAL GASTRECTOMY LYSIS OF ADHESIONS;  Surgeon: Dixon Valencia MD;  [facility];  Service: Surgical Oncology     Family History   Problem Relation Age of Onset   • Heart disease Mother    • Hypertension Mother    • Diabetes Mother    • No Known Problems Father    • No Known Problems Maternal Grandmother    • No Known Problems Maternal Grandfather    • No Known Problems Paternal Grandmother    • Prostate cancer Paternal Grandfather 70   • No Known Problems Brother    • No Known Problems Brother    • No Known Problems Brother    • No Known Problems Son    • Breast cancer Maternal Aunt 70   • Ovarian cancer Paternal Aunt    • Endometrial cancer Paternal Aunt 36   • No Known Problems Family    • Colon cancer Neg Hx      Social History     Socioeconomic History   • Marital status: /Civil Union     Spouse name: Not on file   • Number of children: Not on file   • Years of education: Not on file   • Highest education level: Not on file   Occupational History   • Not on file   Tobacco Use   • Smoking status: Former   • Smokeless tobacco: Never   Vaping Use   • Vaping status: Never Used   Substance and Sexual Activity   • Alcohol use: Not Currently   • Drug use: No   • Sexual activity: Not Currently     Partners: Male     Comment:    Other Topics Concern   • Not on file   Social History Narrative   • Not on file     Social Determinants of Health     Financial Resource Strain: Not on file   Food Insecurity: No Food Insecurity (7/12/2024)    Nursing - Inadequate Food Risk Classification    • Worried About Running Out of Food in the Last Year: Never true    • Ran Out of Food in the Last Year: Never true    • Ran Out of Food in the Last Year: Not on file   Transportation Needs: No Transportation Needs (7/12/2024)    PRAPARE - Transportation    • Lack of Transportation  (Medical): No    • Lack of Transportation (Non-Medical): No   Physical Activity: Not on file   Stress: Not on file   Social Connections: Not on file   Intimate Partner Violence: Not on file   Housing Stability: Unknown (7/12/2024)    Housing Stability Vital Sign    • Unable to Pay for Housing in the Last Year: No    • Number of Times Moved in the Last Year: Not on file    • Homeless in the Last Year: No       Current Outpatient Medications:   •  acetaminophen (TYLENOL) 325 mg tablet, Take 2 tablets (650 mg total) by mouth every 6 (six) hours as needed for mild pain, Disp: , Rfl:   •  amLODIPine (NORVASC) 5 mg tablet, , Disp: , Rfl:   •  atorvastatin (LIPITOR) 20 mg tablet, , Disp: , Rfl:   •  benazepril (LOTENSIN) 20 mg tablet, , Disp: , Rfl:   •  estradiol (ESTRACE) 0.1 mg/g vaginal cream, Use 0.5 gm daily x 2 weeks then 0.5 gm twice weekly, Disp: 42.5 g, Rfl: 1  •  gabapentin (NEURONTIN) 300 mg capsule, TAKE ONE CAPSULE BY MOUTH ONCE DAILY at bedtime, Disp: 30 capsule, Rfl: 2  •  methocarbamol (ROBAXIN) 500 mg tablet, Take 1 tablet (500 mg total) by mouth every 8 (eight) hours for 14 days, Disp: 42 tablet, Rfl: 0  •  Multiple Vitamins-Minerals (CENTRUM SILVER PO), Take by mouth, Disp: , Rfl:   •  pantoprazole (PROTONIX) 40 mg tablet, Take 1 tablet (40 mg total) by mouth 2 (two) times a day, Disp: 60 tablet, Rfl: 0  •  spironolactone (ALDACTONE) 25 mg tablet, , Disp: , Rfl:   •  docusate sodium (COLACE) 100 mg capsule, Take 1 capsule (100 mg total) by mouth 2 (two) times a day for 7 days (Patient not taking: Reported on 11/12/2024), Disp: 14 capsule, Rfl: 0  •  ondansetron (ZOFRAN-ODT) 4 mg disintegrating tablet, Take 1 tablet (4 mg total) by mouth every 6 (six) hours as needed for nausea or vomiting for up to 7 days (Patient not taking: Reported on 11/12/2024), Disp: 28 tablet, Rfl: 0  Allergies   Allergen Reactions   • Sulfa Antibiotics      Vitals:    11/12/24 1044   BP: 124/86   Pulse: 93   Resp: 18   Temp:  97.5 °F (36.4 °C)   SpO2: 97%       Physical Exam  Constitutional: General appearance: The Patient is well-developed and well-nourished who appears the stated age in no acute distress. Patient is pleasant and talkative.     HEENT:  Normocephalic.  Sclerae are anicteric. Mucous membranes are moist.    Abdomen: Abdomen is soft, non-tender, non-distended and without masses.  Incision is C/D/I.  Extremities: There is no clubbing or cyanosis. There is no edema.  Symmetric.  Neuro: Grossly nonfocal. Gait is normal.     Skin: Warm, anicteric.    Psych:  Patient is pleasant and talkative.  Breasts:        Pathology:  Final Diagnosis   A. Stomach (Mass), Resection:  - Submucosal leiomyoma, completely resected. See Note.   Electronically signed by Jacinta Rader DO on 11/11/2024 at 11:08 AM   Note    Immunohistochemical stains demonstrate positive tumor cell staining for SMA, Desmin, and Actin, while DOG1, , CD34, and S100 are negative. Ki67 proliferative index is low, and mitoses are scattered and rare. The findings support the diagnosis.     Intradepartmental consultation is in agreement (LX).       Labs:      Imaging  No results found.  I personally reviewed and interpreted the above laboratory and imaging data.

## 2024-11-12 NOTE — ASSESSMENT & PLAN NOTE
59-year-old female with a gastric mass.  He underwent resection and pathology revealed this was a leiomyoma.  She is doing well.  We discussed that it will still take another few weeks for her appetite to return to baseline.  I suspect her belching is postsurgical as we did use a bougie to make sure we did not narrow the esophagus.  She will continue to monitor her symptoms.  If these do not return to normal within the next few weeks she will contact us.  I will see her again in the future should the need arise. She is agreeable to this plan. All her questions were answered.

## 2024-11-18 ENCOUNTER — TELEPHONE (OUTPATIENT)
Age: 59
End: 2024-11-18

## 2024-11-18 NOTE — TELEPHONE ENCOUNTER
FYI:  Call placed to pt to inform per Marry RN/office she may drive as long as not taking narcotics and she may take the Robaxin TID PRN for muscle pain. Pt understood all informations given to her today.

## 2024-11-18 NOTE — TELEPHONE ENCOUNTER
FYI:  Pt called stating she has taking the Robaxin incorrectly since hosp discharge She has been taking 500mg po BID vs TID as ordered. Medication was to be completed yesterday. Has 14 pills left.    Should pt continue with the med until completed and if so what are the directions with the remaining pills??    Also pt would like to know when she is able to being driving?    Pls advise

## 2025-01-06 ENCOUNTER — OFFICE VISIT (OUTPATIENT)
Dept: GASTROENTEROLOGY | Facility: CLINIC | Age: 60
End: 2025-01-06
Payer: COMMERCIAL

## 2025-01-06 VITALS
HEART RATE: 66 BPM | HEIGHT: 62 IN | BODY MASS INDEX: 32.94 KG/M2 | SYSTOLIC BLOOD PRESSURE: 117 MMHG | WEIGHT: 179 LBS | OXYGEN SATURATION: 99 % | DIASTOLIC BLOOD PRESSURE: 84 MMHG

## 2025-01-06 DIAGNOSIS — K59.00 CONSTIPATION, UNSPECIFIED CONSTIPATION TYPE: ICD-10-CM

## 2025-01-06 DIAGNOSIS — K27.5 PERFORATED ULCER (HCC): ICD-10-CM

## 2025-01-06 DIAGNOSIS — Z12.11 SCREENING FOR COLON CANCER: ICD-10-CM

## 2025-01-06 DIAGNOSIS — D21.4 LEIOMYOMA OF STOMACH: Primary | ICD-10-CM

## 2025-01-06 PROCEDURE — 99214 OFFICE O/P EST MOD 30 MIN: CPT | Performed by: INTERNAL MEDICINE

## 2025-01-06 RX ORDER — SODIUM CHLORIDE, SODIUM LACTATE, POTASSIUM CHLORIDE, CALCIUM CHLORIDE 600; 310; 30; 20 MG/100ML; MG/100ML; MG/100ML; MG/100ML
125 INJECTION, SOLUTION INTRAVENOUS CONTINUOUS
OUTPATIENT
Start: 2025-01-06

## 2025-01-06 RX ORDER — FAMOTIDINE 40 MG/1
40 TABLET, FILM COATED ORAL DAILY
Qty: 90 TABLET | Refills: 0 | Status: SHIPPED | OUTPATIENT
Start: 2025-01-06 | End: 2025-04-06

## 2025-01-06 NOTE — ASSESSMENT & PLAN NOTE
-Status post surgical resection with pathology specimen confirming leiomyoma.  -Patient recently followed up with surgical oncology and no further follow-up is recommended.

## 2025-01-06 NOTE — PATIENT INSTRUCTIONS
Scheduled date of colonoscopy (as of today): 3/24/25  Physician performing colonoscopy: Dr. Alcaraz  Location of colonoscopy: [facility]  Bowel prep reviewed with patient: Miralax/Dulcolax  Instructions reviewed with patient by: N.M.  Clearances: KIRSTEN

## 2025-01-06 NOTE — ASSESSMENT & PLAN NOTE
-Patient did have a perforated gastric ulcer for which she underwent emergency surgery with Amor patch placement.  -Patient is currently on pantoprazole 40 mg in daily basis.  She reports some postprandial epigastric discomfort, therefore would recommend addition of famotidine 40 mg to be taken at bedtime.  -We went over GERD diet extensively, patient does report recent indiscretions which accounted for abdominal pain.  -Would recommend to avoid any NSAIDs.  -Most recent EGD/EUS showed normal esophagus, normal duodenum bulb.  There were findings of submucosal lesion with FNA subsequently confirming leiomyoma for which patient underwent surgical resection.  Orders:    famotidine (PEPCID) 40 MG tablet; Take 1 tablet (40 mg total) by mouth daily

## 2025-01-06 NOTE — ASSESSMENT & PLAN NOTE
-Because having had a colonoscopy 5 to 6 years ago, tells me that she is due for colonoscopy at this time.  Previous colonoscopy at [facility].  No records available.  -Patient says she may have had polyps.  -Will schedule colonoscopy on today's visit.  -I obtained informed consent from the patient. The risks/benefits/alternatives of the procedure were discussed with the patient. Risks included, but not limited to, infection, bleeding, perforation, injury to organs in the abdomen, missed lesion and incomplete procedure were discussed. Patient was agreeable and electronic consent was signed.  -Recommend MiraLAX/Dulcolax bowel prep.  Orders:    Colonoscopy; Future

## 2025-01-06 NOTE — PROGRESS NOTES
Name: Nidhi Jeffries      : 1965      MRN: [id number]  Encounter Provider: Roe Alcaraz MD  Encounter Date: 2025   Encounter department: [facility]  :  Assessment & Plan  Leiomyoma of stomach  -Status post surgical resection with pathology specimen confirming leiomyoma.  -Patient recently followed up with surgical oncology and no further follow-up is recommended.         Screening for colon cancer  -Because having had a colonoscopy 5 to 6 years ago, tells me that she is due for colonoscopy at this time.  Previous colonoscopy at [facility].  No records available.  -Patient says she may have had polyps.  -Will schedule colonoscopy on today's visit.  -I obtained informed consent from the patient. The risks/benefits/alternatives of the procedure were discussed with the patient. Risks included, but not limited to, infection, bleeding, perforation, injury to organs in the abdomen, missed lesion and incomplete procedure were discussed. Patient was agreeable and electronic consent was signed.  -Recommend MiraLAX/Dulcolax bowel prep.  Orders:    Colonoscopy; Future    Perforated ulcer (HCC)  -Patient did have a perforated gastric ulcer for which she underwent emergency surgery with Amor patch placement.  -Patient is currently on pantoprazole 40 mg in daily basis.  She reports some postprandial epigastric discomfort, therefore would recommend addition of famotidine 40 mg to be taken at bedtime.  -We went over GERD diet extensively, patient does report recent indiscretions which accounted for abdominal pain.  -Would recommend to avoid any NSAIDs.  -Most recent EGD/EUS showed normal esophagus, normal duodenum bulb.  There were findings of submucosal lesion with FNA subsequently confirming leiomyoma for which patient underwent surgical resection.  Orders:    famotidine (PEPCID) 40 MG tablet; Take 1 tablet (40 mg total) by mouth daily    Constipation,  unspecified constipation type  -Reports history of chronic constipation for which she takes stool softener daily.  -Recommend addition of psyllium husk 1 capful on daily basis.  -Recommend taking MiraLAX 1 capful on daily basis the week before her colonoscopy.  -If no significant improvement, recommend addition of secretagogue.             History of Present Illness   HPI  Nidhi Jeffries is a 59 y.o. female history of hypertension, hyperlipidemia, RA, was hospitalized in July with sudden onset of abdominal pain with admission findings concerning for perforated gastric antral/pyloric ulcer.  Currently there was masslike structure within the proximal body.  She underwent emergency surgery with Amor patch.  Surgical biopsies were negative for H. pylori.  Patient subsequently underwent endoscopic ultrasound for gastric mass.  FNA was performed with findings suggestive of leiomyoma.  She subsequently underwent resection of the gastric lesion with pathology confirming submucosal leiomyoma as well.  Patient is here today for follow-up.  Patient reports that she is generally feeling much better than before however she still has some epigastric abdominal pain which is postprandial.  She does report that she is not following a GERD diet.  She denies any NSAID use fortunately.  She denies any melena, hematemesis, hematochezia.  No unintentional weight loss.  Patient does report that she occasionally feels constipated and is taking stool softener on baseline.  Denies any change in bowel habits however.  Reports having had a colonoscopy 5 to 6 years ago at [facility] and tells me that she is due at this time and prefers to have a colonoscopy by us.  No family history of GI malignancy.  Labs are reviewed, hemoglobin within normal range, liver enzymes normal.  Platelets normal.        Review of Systems   Constitutional: Negative.    HENT: Negative.     Eyes: Negative.    Respiratory: Negative.     Cardiovascular:  "Negative.    Gastrointestinal:         See HPI.   Endocrine: Negative.    Genitourinary: Negative.    Musculoskeletal: Negative.    Skin: Negative.    Allergic/Immunologic: Negative.    Neurological: Negative.    Hematological: Negative.    Psychiatric/Behavioral: Negative.     All other systems reviewed and are negative.         Objective   /84 (BP Location: Right arm, Patient Position: Sitting, Cuff Size: Standard)   Pulse 66   Ht 5' 2\" (1.575 m)   Wt 81.2 kg (179 lb)   LMP 09/26/2020 (Exact Date)   SpO2 99%   BMI 32.74 kg/m²      Physical Exam  Vitals and nursing note reviewed.   Constitutional:       General: She is not in acute distress.     Appearance: She is well-developed.   HENT:      Head: Normocephalic and atraumatic.   Eyes:      General: No scleral icterus.     Conjunctiva/sclera: Conjunctivae normal.   Cardiovascular:      Rate and Rhythm: Normal rate and regular rhythm.      Heart sounds: No murmur heard.  Pulmonary:      Effort: Pulmonary effort is normal. No respiratory distress.      Breath sounds: Normal breath sounds.   Abdominal:      Palpations: Abdomen is soft.      Tenderness: There is no abdominal tenderness.   Musculoskeletal:         General: No swelling.      Cervical back: Neck supple.   Skin:     General: Skin is warm and dry.   Neurological:      Mental Status: She is alert.   Psychiatric:         Mood and Affect: Mood normal.           "

## 2025-01-11 DIAGNOSIS — M48.062 LUMBAR STENOSIS WITH NEUROGENIC CLAUDICATION: ICD-10-CM

## 2025-01-13 RX ORDER — GABAPENTIN 300 MG/1
300 CAPSULE ORAL
Qty: 30 CAPSULE | Refills: 0 | Status: SHIPPED | OUTPATIENT
Start: 2025-01-13

## 2025-02-05 PROBLEM — Z12.11 SCREENING FOR COLON CANCER: Status: RESOLVED | Noted: 2025-01-06 | Resolved: 2025-02-05

## 2025-02-10 DIAGNOSIS — M48.062 LUMBAR STENOSIS WITH NEUROGENIC CLAUDICATION: ICD-10-CM

## 2025-02-10 RX ORDER — GABAPENTIN 300 MG/1
300 CAPSULE ORAL
Qty: 30 CAPSULE | Refills: 0 | Status: SHIPPED | OUTPATIENT
Start: 2025-02-10

## 2025-03-10 ENCOUNTER — ANESTHESIA (OUTPATIENT)
Dept: ANESTHESIOLOGY | Facility: HOSPITAL | Age: 60
End: 2025-03-10

## 2025-03-10 ENCOUNTER — ANESTHESIA EVENT (OUTPATIENT)
Dept: ANESTHESIOLOGY | Facility: HOSPITAL | Age: 60
End: 2025-03-10

## 2025-03-12 DIAGNOSIS — M48.062 LUMBAR STENOSIS WITH NEUROGENIC CLAUDICATION: ICD-10-CM

## 2025-03-12 RX ORDER — GABAPENTIN 300 MG/1
300 CAPSULE ORAL
Qty: 90 CAPSULE | Refills: 3 | Status: SHIPPED | OUTPATIENT
Start: 2025-03-12

## 2025-03-24 ENCOUNTER — ANESTHESIA EVENT (OUTPATIENT)
Dept: GASTROENTEROLOGY | Facility: AMBULARY SURGERY CENTER | Age: 60
End: 2025-03-24
Payer: COMMERCIAL

## 2025-03-24 ENCOUNTER — HOSPITAL ENCOUNTER (OUTPATIENT)
Dept: GASTROENTEROLOGY | Facility: AMBULARY SURGERY CENTER | Age: 60
Setting detail: OUTPATIENT SURGERY
Discharge: HOME | End: 2025-03-24
Attending: INTERNAL MEDICINE
Payer: COMMERCIAL

## 2025-03-24 VITALS
RESPIRATION RATE: 20 BRPM | HEIGHT: 62 IN | SYSTOLIC BLOOD PRESSURE: 124 MMHG | WEIGHT: 166 LBS | HEART RATE: 86 BPM | DIASTOLIC BLOOD PRESSURE: 78 MMHG | TEMPERATURE: 97.6 F | BODY MASS INDEX: 30.55 KG/M2 | OXYGEN SATURATION: 98 %

## 2025-03-24 DIAGNOSIS — Z12.11 SCREENING FOR COLON CANCER: ICD-10-CM

## 2025-03-24 PROCEDURE — 88305 TISSUE EXAM BY PATHOLOGIST: CPT | Performed by: PATHOLOGY

## 2025-03-24 PROCEDURE — 45385 COLONOSCOPY W/LESION REMOVAL: CPT | Performed by: INTERNAL MEDICINE

## 2025-03-24 RX ORDER — PROPOFOL 10 MG/ML
INJECTION, EMULSION INTRAVENOUS AS NEEDED
Status: DISCONTINUED | OUTPATIENT
Start: 2025-03-24 | End: 2025-03-24

## 2025-03-24 RX ORDER — PROPOFOL 10 MG/ML
INJECTION, EMULSION INTRAVENOUS CONTINUOUS PRN
Status: DISCONTINUED | OUTPATIENT
Start: 2025-03-24 | End: 2025-03-24

## 2025-03-24 RX ORDER — LIDOCAINE HYDROCHLORIDE 10 MG/ML
INJECTION, SOLUTION EPIDURAL; INFILTRATION; INTRACAUDAL; PERINEURAL AS NEEDED
Status: DISCONTINUED | OUTPATIENT
Start: 2025-03-24 | End: 2025-03-24

## 2025-03-24 RX ORDER — SODIUM CHLORIDE, SODIUM LACTATE, POTASSIUM CHLORIDE, CALCIUM CHLORIDE 600; 310; 30; 20 MG/100ML; MG/100ML; MG/100ML; MG/100ML
INJECTION, SOLUTION INTRAVENOUS CONTINUOUS PRN
Status: DISCONTINUED | OUTPATIENT
Start: 2025-03-24 | End: 2025-03-24

## 2025-03-24 RX ORDER — SODIUM CHLORIDE, SODIUM LACTATE, POTASSIUM CHLORIDE, CALCIUM CHLORIDE 600; 310; 30; 20 MG/100ML; MG/100ML; MG/100ML; MG/100ML
125 INJECTION, SOLUTION INTRAVENOUS CONTINUOUS
Status: DISCONTINUED | OUTPATIENT
Start: 2025-03-24 | End: 2025-03-28 | Stop reason: HOSPADM

## 2025-03-24 RX ADMIN — PROPOFOL 130 MCG/KG/MIN: 10 INJECTION, EMULSION INTRAVENOUS at 10:08

## 2025-03-24 RX ADMIN — SODIUM CHLORIDE, SODIUM LACTATE, POTASSIUM CHLORIDE, AND CALCIUM CHLORIDE: .6; .31; .03; .02 INJECTION, SOLUTION INTRAVENOUS at 09:43

## 2025-03-24 RX ADMIN — LIDOCAINE HYDROCHLORIDE 50 MG: 10 INJECTION, SOLUTION EPIDURAL; INFILTRATION; INTRACAUDAL at 10:08

## 2025-03-24 RX ADMIN — PROPOFOL 100 MG: 10 INJECTION, EMULSION INTRAVENOUS at 10:08

## 2025-03-24 NOTE — ANESTHESIA PREPROCEDURE EVALUATION
Procedure:  COLONOSCOPY    Relevant Problems   CARDIO   (+) Essential hypertension   (+) Hyperlipidemia      GI/HEPATIC   (+) Acute gastric ulcer with perforation (HCC)   (+) Perforated ulcer (HCC)      MUSCULOSKELETAL   (+) Generalized osteoarthritis      NEURO/PSYCH   (+) Paresthesia of hand, bilateral        Physical Exam    Airway    Mallampati score: II  TM Distance: >3 FB  Neck ROM: full     Dental    upper dentures    Cardiovascular      Pulmonary      Other Findings  post-pubertal.      Anesthesia Plan  ASA Score- 2     Anesthesia Type- IV sedation with anesthesia with ASA Monitors.         Additional Monitors:     Airway Plan:            Plan Factors-Exercise tolerance (METS): >4 METS.    Chart reviewed.    Patient summary reviewed.    Patient is not a current smoker.      There is medical exclusion for perioperative obstructive sleep apnea risk education.        Induction- intravenous.    Postoperative Plan-         Informed Consent- Anesthetic plan and risks discussed with patient.  I personally reviewed this patient with the CRNA. Discussed and agreed on the Anesthesia Plan with the CRNA..      NPO Status:  Vitals Value Taken Time   Date of last liquid 03/24/25 03/24/25 0913   Time of last liquid 0300 03/24/25 0913   Date of last solid 03/22/25 03/24/25 0913   Time of last solid 1900 03/24/25 0913

## 2025-03-24 NOTE — H&P
"History and Physical -  [facility]  Nidhi Jeffries 59 y.o. female MRN: [id number]    HPI: Nidhi Jeffries is a 59 y.o. year old female who presents for colon cancer screening evaluation.      Review of Systems    Historical Information   Past Medical History:   Diagnosis Date    Hypertension     Rheumatoid arthritis (HCC)      Past Surgical History:   Procedure Laterality Date     SECTION      COLONOSCOPY      due     EGD Bilateral     LAPAROTOMY N/A 07/10/2024    Procedure: LAPAROTOMY EXPLORATORY, MODIFIED KYRIE PATCH;  Surgeon: Luis Alberto Lopez DO;  Location: [facility];  Service: General    WI GSTRCT PRTL DSTL W/GASTRODUODENOSTOMY N/A 10/30/2024    Procedure: PARTIAL GASTRECTOMY LYSIS OF ADHESIONS;  Surgeon: Dixon Valencia MD;  Location: [facility];  Service: Surgical Oncology     Social History   Social History     Substance and Sexual Activity   Alcohol Use Not Currently     Social History     Substance and Sexual Activity   Drug Use No     Social History     Tobacco Use   Smoking Status Former   Smokeless Tobacco Never     Family History   Problem Relation Age of Onset    Heart disease Mother     Hypertension Mother     Diabetes Mother     No Known Problems Father     No Known Problems Maternal Grandmother     No Known Problems Maternal Grandfather     No Known Problems Paternal Grandmother     Prostate cancer Paternal Grandfather 70    No Known Problems Brother     No Known Problems Brother     No Known Problems Brother     No Known Problems Son     Breast cancer Maternal Aunt 70    Ovarian cancer Paternal Aunt     Endometrial cancer Paternal Aunt 36    No Known Problems Family     Colon cancer Neg Hx        Meds/Allergies     Not in a hospital admission.    Allergies   Allergen Reactions    Sulfa Antibiotics     Sulfamethoxazole-Trimethoprim Other (See Comments)       Objective     /68   Pulse 83   Temp (!) 97.4 °F (36.3 °C) (Temporal)   Resp 16   Ht 5' 2\" (1.575 m)   " Wt 75.3 kg (166 lb)   LMP 09/26/2020 (Exact Date)   SpO2 99%   BMI 30.36 kg/m²       PHYSICAL EXAM    Gen: NAD  CV: RRR  CHEST: Clear  ABD: soft, NT/ND  EXT: no edema  Neuro: AAO      ASSESSMENT/PLAN:  This is a 59 y.o. year old female here for colon cancer screening evaluation.  Patient was explained about the risks and benefits of the procedure. Risks including but not limited to bleeding, infection, perforation were explained in detail.     PLAN:   Procedure: Colonoscopy

## 2025-03-24 NOTE — ANESTHESIA POSTPROCEDURE EVALUATION
Post-Op Assessment Note    CV Status:  Stable  Pain Score: 0    Pain management: adequate       Mental Status:  Sleepy   Hydration Status:  Euvolemic   PONV Controlled:  Controlled   Airway Patency:  Patent     Post Op Vitals Reviewed: Yes    No anethesia notable event occurred.    Staff: CRNA   Comments: vss sv nonobstructed uneventful          Last Filed PACU Vitals:  Vitals Value Taken Time   Temp     Pulse 84    /57    Resp 18    SpO2 98

## 2025-03-30 ENCOUNTER — RESULTS FOLLOW-UP (OUTPATIENT)
Age: 60
End: 2025-03-30

## 2025-04-01 NOTE — TELEPHONE ENCOUNTER
----- Message from Roe Alcaraz MD sent at 3/30/2025  6:25 PM EDT -----  Please inform the patient that one of the polyps was completely benign, the lumen was benign precancerous type of polyp.  I would recommend a repeat colonoscopy in 5 years.  Thank you  Dr. Alcaraz

## 2025-04-01 NOTE — TELEPHONE ENCOUNTER
Attempted to call pt regarding colonoscopy results and provider recommendations, however, I received voicemail. Advised to return call to office to discuss. 5 year colon recall set.    Please, relay results if pt returns call. Thanks!

## 2025-04-02 DIAGNOSIS — K27.5 PERFORATED ULCER (HCC): ICD-10-CM

## 2025-04-02 RX ORDER — FAMOTIDINE 40 MG/1
40 TABLET, FILM COATED ORAL DAILY
Qty: 90 TABLET | Refills: 1 | Status: SHIPPED | OUTPATIENT
Start: 2025-04-02

## 2025-05-08 ENCOUNTER — APPOINTMENT (OUTPATIENT)
Dept: LAB | Facility: MEDICAL CENTER | Age: 60
End: 2025-05-08
Payer: COMMERCIAL

## 2025-05-08 DIAGNOSIS — E78.2 MIXED HYPERLIPIDEMIA: ICD-10-CM

## 2025-05-08 LAB
CHOLEST SERPL-MCNC: 160 MG/DL (ref ?–200)
HDLC SERPL-MCNC: 51 MG/DL
LDLC SERPL CALC-MCNC: 92 MG/DL (ref 0–100)
NONHDLC SERPL-MCNC: 109 MG/DL
TRIGL SERPL-MCNC: 85 MG/DL (ref ?–150)

## 2025-05-08 PROCEDURE — 80061 LIPID PANEL: CPT

## 2025-05-08 PROCEDURE — 36415 COLL VENOUS BLD VENIPUNCTURE: CPT

## (undated) DEVICE — SUT PDS II 3-0 SH 27 IN Z316H

## (undated) DEVICE — SUT SILK 2-0 SH 30 IN K833H

## (undated) DEVICE — BLANKET HYPOTHERMIA ADULT GAYMAR

## (undated) DEVICE — PENCIL ELECTROSURG E-Z CLEAN -0035H

## (undated) DEVICE — 3M™ IOBAN™ 2 ANTIMICROBIAL INCISE DRAPE 6650EZ: Brand: IOBAN™ 2

## (undated) DEVICE — BETHLEHEM MAJOR GENERAL PACK: Brand: CARDINAL HEALTH

## (undated) DEVICE — SUT SILK 3-0 18 IN A184H

## (undated) DEVICE — PLUMEPEN PRO 10FT

## (undated) DEVICE — GLOVE SRG BIOGEL ECLIPSE 8

## (undated) DEVICE — PROXIMATE SKIN STAPLERS (35 WIDE) CONTAINS 35 STAINLESS STEEL STAPLES (FIXED HEAD): Brand: PROXIMATE

## (undated) DEVICE — THE ECHELON FLEX POWERED PLUS ARTICULATING ENDOSCOPIC LINEAR CUTTERS ARE STERILE, SINGLE PATIENT USE INSTRUMENTS THAT SIMULTANEOUSLYCUT AND STAPLE TISSUE. THERE ARE SIX STAGGERED ROWS OF STAPLES, THREE ON EITHER SIDE OF THE CUT LINE. THE ECHELON FLEX 45 POWERED PLUSINSTRUMENTS HAVE A STAPLE LINE THAT IS APPROXIMATELY 45 MM LONG AND A CUT LINE THAT IS APPROXIMATELY 42 MM LONG. THE SHAFT CAN ROTATE FREELYIN BOTH DIRECTIONS AND AN ARTICULATION MECHANISM ENABLES THE DISTAL PORTION OF THE SHAFT TO PIVOT TO FACILITATE LATERAL ACCESS TO THE OPERATIVESITE.THE INSTRUMENTS ARE PACKAGED WITH A PRIMARY LITHIUM BATTERY PACK THAT MUST BE INSTALLED PRIOR TO USE. THERE ARE SPECIFIC REQUIREMENTS FORDISPOSING OF THE BATTERY PACK. REFER TO THE BATTERY PACK DISPOSAL SECTION.THE INSTRUMENTS ARE PACKAGED WITHOUT A RELOAD AND MUST BE LOADED PRIOR TO USE. A STAPLE RETAINING CAP ON THE RELOAD PROTECTS THE STAPLE LEGPOINTS DURING SHIPPING AND TRANSPORTATION. THE INSTRUMENTS’ LOCK-OUT FEATURE IS DESIGNED TO PREVENT A USED OR IMPROPERLY INSTALLED RELOADFROM BEING REFIRED OR AN INSTRUMENT FROM BEING FIRED WITHOUT A RELOAD.: Brand: ECHELON FLEX

## (undated) DEVICE — INTENDED FOR TISSUE SEPARATION, AND OTHER PROCEDURES THAT REQUIRE A SHARP SURGICAL BLADE TO PUNCTURE OR CUT.: Brand: BARD-PARKER SAFETY BLADES SIZE 10, STERILE

## (undated) DEVICE — POOLE SUCTION HANDLE: Brand: CARDINAL HEALTH

## (undated) DEVICE — SUT VICRYL PLUS 2-0 54IN VCP286G

## (undated) DEVICE — SUT VICRYL 0 CT-1 27 IN J260H

## (undated) DEVICE — TRAY FOLEY 16FR URIMETER SURESTEP

## (undated) DEVICE — THE ECHELON, ECHELON ENDOPATH™ AND ECHELON FLEX™ FAMILIES OF ENDOSCOPIC LINEAR CUTTERS AND RELOADS ARE STERILE, SINGLE PATIENT USE INSTRUMENTS THAT SIMULTANEOUSLY CUT AND STAPLE TISSUE. THERE ARE SIX STAGGERED ROWS OF STAPLES, THREE ON EITHER SIDE OF THE CUT LINE. THE 45 MM INSTRUMENTS HAVE A STAPLE LINE THATIS APPROXIMATELY 45 MM LONG AND A CUT LINE THAT IS APPROXIMATELY 42 MM LONG. THE SHAFT CAN ROTATE FREELY IN BOTH DIRECTIONS AND AN ARTICULATION MECHANISM ON ARTICULATING INSTRUMENTS ENABLES BENDING THE DISTAL PORTIONOF THE SHAFT TO FACILITATE LATERAL ACCESS OF THE OPERATIVE SITE.THE INSTRUMENTS ARE SHIPPED WITHOUT A RELOAD AND MUST BE LOADED PRIOR TO USE. A STAPLE RETAINING CAP ON THE RELOAD PROTECTS THE STAPLE LEG POINTS DURING SHIPPING AND TRANSPORTATION. THE INSTRUMENTS’ LOCK-OUT FEATURE IS DESIGNED TO PREVENT A USED RELOAD FROM BEING REFIRED.: Brand: ECHELON ENDOPATH

## (undated) DEVICE — GAUZE SPONGES,16 PLY: Brand: CURITY

## (undated) DEVICE — INTENDED FOR TISSUE SEPARATION, AND OTHER PROCEDURES THAT REQUIRE A SHARP SURGICAL BLADE TO PUNCTURE OR CUT.: Brand: BARD-PARKER SAFETY BLADES SIZE 15, STERILE

## (undated) DEVICE — SUT VICRYL PLUS 0 54IN VCP287G

## (undated) DEVICE — ENSEAL 20 CM SHAFT, LARGE JAW: Brand: ENSEAL X1

## (undated) DEVICE — JP CHANNEL DRAIN 19FR, FULL FLUTES: Brand: JACKSON-PRATT

## (undated) DEVICE — TOWEL SURG XR DETECT GREEN STRL RFD

## (undated) DEVICE — SUT SILK 0 SH 30 IN K834H

## (undated) DEVICE — ANTIBACTERIAL UNDYED BRAIDED (POLYGLACTIN 910), SYNTHETIC ABSORBABLE SUTURE: Brand: COATED VICRYL

## (undated) DEVICE — SUT PDS PLUS 1 CTB 36 IN PDPB359T

## (undated) DEVICE — SUT ETHILON 3-0 PSLX 30IN 1683H

## (undated) DEVICE — SUT PDS II 1 CTX 36 IN Z371T

## (undated) DEVICE — ELECTRODE BLADE MOD  E-Z CLEAN 6.5IN -0014M

## (undated) DEVICE — SPONGE LAP 18 X 18 IN STRL RFD

## (undated) DEVICE — PROXIMATE RELOADABLE LINEAR STAPLER: Brand: PROXIMATE

## (undated) DEVICE — LIGACLIP MCA MULTIPLE CLIP APPLIERS, 20 MEDIUM CLIPS: Brand: LIGACLIP

## (undated) DEVICE — MEDI-VAC YANK SUCT HNDL W/TPRD BULBOUS TIP: Brand: CARDINAL HEALTH

## (undated) DEVICE — CHLORAPREP HI-LITE 26ML ORANGE

## (undated) DEVICE — JACKSON-PRATT 100CC BULB RESERVOIR: Brand: CARDINAL HEALTH

## (undated) DEVICE — ABDOMINAL PAD: Brand: DERMACEA

## (undated) DEVICE — GLOVE INDICATOR PI UNDERGLOVE SZ 8 BLUE

## (undated) DEVICE — EXOFIN PRECISION PEN HIGH VISCOSITY TOPICAL SKIN ADHESIVE: Brand: EXOFIN PRECISION PEN, 1G

## (undated) DEVICE — GLOVE SRG BIOGEL ORTHOPEDIC 8

## (undated) DEVICE — SUT ETHILON 3-0 PS-1 18 IN 1663G

## (undated) DEVICE — SUT MONOCRYL PLUS 4-0 PS-2 18 IN MCP496G

## (undated) DEVICE — SUT PROLENE 2-0 SH 36 IN 8523H